# Patient Record
Sex: MALE | Race: WHITE | Employment: OTHER | URBAN - METROPOLITAN AREA
[De-identification: names, ages, dates, MRNs, and addresses within clinical notes are randomized per-mention and may not be internally consistent; named-entity substitution may affect disease eponyms.]

---

## 2021-10-20 ENCOUNTER — APPOINTMENT (OUTPATIENT)
Dept: CT IMAGING | Age: 71
End: 2021-10-20
Payer: MEDICARE

## 2021-10-20 ENCOUNTER — HOSPITAL ENCOUNTER (INPATIENT)
Age: 71
LOS: 6 days | Discharge: HOME | DRG: 166 | End: 2021-10-26
Attending: INTERNAL MEDICINE | Admitting: INTERNAL MEDICINE
Payer: MEDICARE

## 2021-10-20 ENCOUNTER — APPOINTMENT (OUTPATIENT)
Dept: MRI IMAGING | Age: 71
End: 2021-10-20
Payer: MEDICARE

## 2021-10-20 ENCOUNTER — HOSPITAL ENCOUNTER (EMERGENCY)
Age: 71
Discharge: ANOTHER HOSPITAL | End: 2021-10-20
Attending: EMERGENCY MEDICINE
Payer: MEDICARE

## 2021-10-20 VITALS
DIASTOLIC BLOOD PRESSURE: 96 MMHG | HEART RATE: 80 BPM | BODY MASS INDEX: 26.95 KG/M2 | WEIGHT: 210 LBS | TEMPERATURE: 97 F | OXYGEN SATURATION: 98 % | SYSTOLIC BLOOD PRESSURE: 131 MMHG | HEIGHT: 74 IN | RESPIRATION RATE: 27 BRPM

## 2021-10-20 DIAGNOSIS — R91.8 LUNG MASS: ICD-10-CM

## 2021-10-20 DIAGNOSIS — R41.0 DELIRIUM, ACUTE: Primary | ICD-10-CM

## 2021-10-20 DIAGNOSIS — D49.6 NEOPLASM OF BRAIN CAUSING MASS EFFECT ON ADJACENT STRUCTURES (HCC): ICD-10-CM

## 2021-10-20 DIAGNOSIS — G93.5 NEOPLASM OF BRAIN CAUSING MASS EFFECT ON ADJACENT STRUCTURES (HCC): ICD-10-CM

## 2021-10-20 DIAGNOSIS — G40.909 SEIZURE CEREBRAL (HCC): ICD-10-CM

## 2021-10-20 DIAGNOSIS — S22.43XA CLOSED FRACTURE OF MULTIPLE RIBS OF BOTH SIDES, INITIAL ENCOUNTER: ICD-10-CM

## 2021-10-20 PROBLEM — R90.0 INTRACRANIAL MASS: Status: ACTIVE | Noted: 2021-10-20

## 2021-10-20 LAB
A/G RATIO: 1.3 (ref 1.1–2.2)
ALBUMIN SERPL-MCNC: 3.9 G/DL (ref 3.4–5)
ALP BLD-CCNC: 86 U/L (ref 40–129)
ALT SERPL-CCNC: 20 U/L (ref 10–40)
AMMONIA: 36 UMOL/L (ref 16–60)
AMPHETAMINE SCREEN, URINE: ABNORMAL
ANION GAP SERPL CALCULATED.3IONS-SCNC: 16 MMOL/L (ref 3–16)
APPEARANCE CSF: CLEAR
APPEARANCE CSF: CLEAR
AST SERPL-CCNC: 17 U/L (ref 15–37)
BARBITURATE SCREEN URINE: ABNORMAL
BASE EXCESS ARTERIAL: -2.1 MMOL/L (ref -3–3)
BASE EXCESS VENOUS: -2.8 MMOL/L (ref -3–3)
BASOPHILS ABSOLUTE: 0.1 K/UL (ref 0–0.2)
BASOPHILS RELATIVE PERCENT: 0.8 %
BENZODIAZEPINE SCREEN, URINE: ABNORMAL
BILIRUB SERPL-MCNC: 0.3 MG/DL (ref 0–1)
BILIRUBIN DIRECT: <0.2 MG/DL (ref 0–0.3)
BILIRUBIN URINE: NEGATIVE
BILIRUBIN, INDIRECT: NORMAL MG/DL (ref 0–1)
BLOOD, URINE: NEGATIVE
BUN BLDV-MCNC: 24 MG/DL (ref 7–20)
CALCIUM SERPL-MCNC: 9.6 MG/DL (ref 8.3–10.6)
CANNABINOID SCREEN URINE: POSITIVE
CARBOXYHEMOGLOBIN ARTERIAL: 1.7 % (ref 0–1.5)
CARBOXYHEMOGLOBIN: 3.6 % (ref 0–1.5)
CHLORIDE BLD-SCNC: 103 MMOL/L (ref 99–110)
CLARITY: CLEAR
CLOT EVALUATION CSF: NORMAL
CLOT EVALUATION CSF: NORMAL
CO2: 19 MMOL/L (ref 21–32)
COCAINE METABOLITE SCREEN URINE: ABNORMAL
COLOR CSF: COLORLESS
COLOR CSF: COLORLESS
COLOR: YELLOW
CREAT SERPL-MCNC: 1 MG/DL (ref 0.8–1.3)
EKG ATRIAL RATE: 159 BPM
EKG ATRIAL RATE: 46 BPM
EKG DIAGNOSIS: NORMAL
EKG DIAGNOSIS: NORMAL
EKG P AXIS: 28 DEGREES
EKG P-R INTERVAL: 196 MS
EKG Q-T INTERVAL: 302 MS
EKG Q-T INTERVAL: 488 MS
EKG QRS DURATION: 122 MS
EKG QRS DURATION: 90 MS
EKG QTC CALCULATION (BAZETT): 427 MS
EKG QTC CALCULATION (BAZETT): 489 MS
EKG R AXIS: 25 DEGREES
EKG R AXIS: 57 DEGREES
EKG T AXIS: -1 DEGREES
EKG T AXIS: -67 DEGREES
EKG VENTRICULAR RATE: 158 BPM
EKG VENTRICULAR RATE: 46 BPM
EOSINOPHILS ABSOLUTE: 0.1 K/UL (ref 0–0.6)
EOSINOPHILS RELATIVE PERCENT: 1.1 %
EPITHELIAL CELLS, UA: 1 /HPF (ref 0–5)
GFR AFRICAN AMERICAN: >60
GFR NON-AFRICAN AMERICAN: >60
GLOBULIN: 3.1 G/DL
GLUCOSE BLD-MCNC: 185 MG/DL (ref 70–99)
GLUCOSE URINE: NEGATIVE MG/DL
GLUCOSE, CSF: 90 MG/DL (ref 40–80)
HCO3 ARTERIAL: 25.1 MMOL/L (ref 21–29)
HCO3 VENOUS: 21.8 MMOL/L (ref 23–29)
HCT VFR BLD CALC: 42.2 % (ref 40.5–52.5)
HEMOGLOBIN, ART, EXTENDED: 16 G/DL (ref 13.5–17.5)
HEMOGLOBIN: 13.9 G/DL (ref 13.5–17.5)
HYALINE CASTS: 0 /LPF (ref 0–8)
KETONES, URINE: NEGATIVE MG/DL
LACTIC ACID: 6.7 MMOL/L (ref 0.4–2)
LEUKOCYTE ESTERASE, URINE: NEGATIVE
LIPASE: 21 U/L (ref 13–60)
LYMPHOCYTES ABSOLUTE: 1.2 K/UL (ref 1–5.1)
LYMPHOCYTES RELATIVE PERCENT: 10.1 %
Lab: ABNORMAL
MCH RBC QN AUTO: 28.3 PG (ref 26–34)
MCHC RBC AUTO-ENTMCNC: 32.8 G/DL (ref 31–36)
MCV RBC AUTO: 86.2 FL (ref 80–100)
METHADONE SCREEN, URINE: ABNORMAL
METHEMOGLOBIN ARTERIAL: 0 %
METHEMOGLOBIN VENOUS: 0 %
MICROSCOPIC EXAMINATION: YES
MONOCYTES ABSOLUTE: 0.9 K/UL (ref 0–1.3)
MONOCYTES RELATIVE PERCENT: 7.1 %
NEUTROPHILS ABSOLUTE: 9.7 K/UL (ref 1.7–7.7)
NEUTROPHILS RELATIVE PERCENT: 80.9 %
NITRITE, URINE: NEGATIVE
NO DIFFERENTIAL CSF: NORMAL
NO DIFFERENTIAL CSF: NORMAL
O2 CONTENT, VEN: 19 VOL %
O2 SAT, ARTERIAL: >100 %
O2 SAT, VEN: 99 %
O2 THERAPY: ABNORMAL
O2 THERAPY: ABNORMAL
OPIATE SCREEN URINE: ABNORMAL
OXYCODONE URINE: ABNORMAL
PCO2 ARTERIAL: 51 MMHG (ref 35–45)
PCO2, VEN: 36.7 MMHG (ref 40–50)
PDW BLD-RTO: 16.4 % (ref 12.4–15.4)
PH ARTERIAL: 7.3 (ref 7.35–7.45)
PH UA: 6
PH UA: 6 (ref 5–8)
PH VENOUS: 7.38 (ref 7.35–7.45)
PHENCYCLIDINE SCREEN URINE: ABNORMAL
PLATELET # BLD: 283 K/UL (ref 135–450)
PMV BLD AUTO: 8.7 FL (ref 5–10.5)
PO2 ARTERIAL: 239 MMHG (ref 75–108)
PO2, VEN: 120 MMHG (ref 25–40)
POTASSIUM REFLEX MAGNESIUM: 4.5 MMOL/L (ref 3.5–5.1)
PROPOXYPHENE SCREEN: ABNORMAL
PROTEIN CSF: 50 MG/DL (ref 15–45)
PROTEIN UA: 100 MG/DL
RBC # BLD: 4.9 M/UL (ref 4.2–5.9)
RBC CSF: 0 /CUMM
RBC CSF: 0 /CUMM
RBC UA: 1 /HPF (ref 0–4)
SODIUM BLD-SCNC: 138 MMOL/L (ref 136–145)
SPECIFIC GRAVITY UA: 1.01 (ref 1–1.03)
TCO2 ARTERIAL: 59.8 MMOL/L
TCO2 CALC VENOUS: 51 MMOL/L
TOTAL CK: 106 U/L (ref 39–308)
TOTAL PROTEIN: 7 G/DL (ref 6.4–8.2)
TROPONIN: <0.01 NG/ML
TUBE NUMBER CSF: NORMAL
TUBE NUMBER CSF: NORMAL
URINE REFLEX TO CULTURE: ABNORMAL
URINE TYPE: ABNORMAL
UROBILINOGEN, URINE: 0.2 E.U./DL
WBC # BLD: 12 K/UL (ref 4–11)
WBC CSF: 0 /CUMM (ref 0–5)
WBC CSF: 0 /CUMM (ref 0–5)
WBC UA: 1 /HPF (ref 0–5)

## 2021-10-20 PROCEDURE — 6360000004 HC RX CONTRAST MEDICATION: Performed by: EMERGENCY MEDICINE

## 2021-10-20 PROCEDURE — 81001 URINALYSIS AUTO W/SCOPE: CPT

## 2021-10-20 PROCEDURE — 6360000002 HC RX W HCPCS

## 2021-10-20 PROCEDURE — 82550 ASSAY OF CK (CPK): CPT

## 2021-10-20 PROCEDURE — 80307 DRUG TEST PRSMV CHEM ANLYZR: CPT

## 2021-10-20 PROCEDURE — 83690 ASSAY OF LIPASE: CPT

## 2021-10-20 PROCEDURE — 99223 1ST HOSP IP/OBS HIGH 75: CPT | Performed by: INTERNAL MEDICINE

## 2021-10-20 PROCEDURE — 6360000002 HC RX W HCPCS: Performed by: NURSE PRACTITIONER

## 2021-10-20 PROCEDURE — 96375 TX/PRO/DX INJ NEW DRUG ADDON: CPT

## 2021-10-20 PROCEDURE — A9577 INJ MULTIHANCE: HCPCS | Performed by: EMERGENCY MEDICINE

## 2021-10-20 PROCEDURE — 96376 TX/PRO/DX INJ SAME DRUG ADON: CPT

## 2021-10-20 PROCEDURE — 96366 THER/PROPH/DIAG IV INF ADDON: CPT

## 2021-10-20 PROCEDURE — 89050 BODY FLUID CELL COUNT: CPT

## 2021-10-20 PROCEDURE — 71260 CT THORAX DX C+: CPT

## 2021-10-20 PROCEDURE — 2000000000 HC ICU R&B

## 2021-10-20 PROCEDURE — 2500000003 HC RX 250 WO HCPCS

## 2021-10-20 PROCEDURE — 84484 ASSAY OF TROPONIN QUANT: CPT

## 2021-10-20 PROCEDURE — 95819 EEG AWAKE AND ASLEEP: CPT

## 2021-10-20 PROCEDURE — 80053 COMPREHEN METABOLIC PANEL: CPT

## 2021-10-20 PROCEDURE — 87040 BLOOD CULTURE FOR BACTERIA: CPT

## 2021-10-20 PROCEDURE — 72125 CT NECK SPINE W/O DYE: CPT

## 2021-10-20 PROCEDURE — 93010 ELECTROCARDIOGRAM REPORT: CPT | Performed by: INTERNAL MEDICINE

## 2021-10-20 PROCEDURE — 87070 CULTURE OTHR SPECIMN AEROBIC: CPT

## 2021-10-20 PROCEDURE — 87205 SMEAR GRAM STAIN: CPT

## 2021-10-20 PROCEDURE — 82945 GLUCOSE OTHER FLUID: CPT

## 2021-10-20 PROCEDURE — 85025 COMPLETE CBC W/AUTO DIFF WBC: CPT

## 2021-10-20 PROCEDURE — 72128 CT CHEST SPINE W/O DYE: CPT

## 2021-10-20 PROCEDURE — 2580000003 HC RX 258: Performed by: NURSE PRACTITIONER

## 2021-10-20 PROCEDURE — 87529 HSV DNA AMP PROBE: CPT

## 2021-10-20 PROCEDURE — 70450 CT HEAD/BRAIN W/O DYE: CPT

## 2021-10-20 PROCEDURE — 3209999900 CT LUMBAR SPINE TRAUMA RECONSTRUCTION

## 2021-10-20 PROCEDURE — 83605 ASSAY OF LACTIC ACID: CPT

## 2021-10-20 PROCEDURE — 99284 EMERGENCY DEPT VISIT MOD MDM: CPT

## 2021-10-20 PROCEDURE — 2500000003 HC RX 250 WO HCPCS: Performed by: EMERGENCY MEDICINE

## 2021-10-20 PROCEDURE — 88112 CYTOPATH CELL ENHANCE TECH: CPT

## 2021-10-20 PROCEDURE — 2580000003 HC RX 258: Performed by: STUDENT IN AN ORGANIZED HEALTH CARE EDUCATION/TRAINING PROGRAM

## 2021-10-20 PROCEDURE — 6360000002 HC RX W HCPCS: Performed by: STUDENT IN AN ORGANIZED HEALTH CARE EDUCATION/TRAINING PROGRAM

## 2021-10-20 PROCEDURE — 2580000003 HC RX 258: Performed by: EMERGENCY MEDICINE

## 2021-10-20 PROCEDURE — 6360000002 HC RX W HCPCS: Performed by: EMERGENCY MEDICINE

## 2021-10-20 PROCEDURE — 84157 ASSAY OF PROTEIN OTHER: CPT

## 2021-10-20 PROCEDURE — 96365 THER/PROPH/DIAG IV INF INIT: CPT

## 2021-10-20 PROCEDURE — 82803 BLOOD GASES ANY COMBINATION: CPT

## 2021-10-20 PROCEDURE — 82140 ASSAY OF AMMONIA: CPT

## 2021-10-20 PROCEDURE — 93005 ELECTROCARDIOGRAM TRACING: CPT | Performed by: EMERGENCY MEDICINE

## 2021-10-20 PROCEDURE — 36415 COLL VENOUS BLD VENIPUNCTURE: CPT

## 2021-10-20 PROCEDURE — 70553 MRI BRAIN STEM W/O & W/DYE: CPT

## 2021-10-20 PROCEDURE — C9113 INJ PANTOPRAZOLE SODIUM, VIA: HCPCS | Performed by: NURSE PRACTITIONER

## 2021-10-20 RX ORDER — ONDANSETRON 2 MG/ML
4 INJECTION INTRAMUSCULAR; INTRAVENOUS EVERY 6 HOURS PRN
Status: DISCONTINUED | OUTPATIENT
Start: 2021-10-20 | End: 2021-10-22

## 2021-10-20 RX ORDER — ACETAMINOPHEN 325 MG/1
650 TABLET ORAL EVERY 6 HOURS PRN
Status: DISCONTINUED | OUTPATIENT
Start: 2021-10-20 | End: 2021-10-26 | Stop reason: HOSPADM

## 2021-10-20 RX ORDER — SODIUM CHLORIDE 0.9 % (FLUSH) 0.9 %
5-40 SYRINGE (ML) INJECTION PRN
Status: DISCONTINUED | OUTPATIENT
Start: 2021-10-20 | End: 2021-10-26 | Stop reason: HOSPADM

## 2021-10-20 RX ORDER — DEXMEDETOMIDINE HYDROCHLORIDE 4 UG/ML
.2-1.4 INJECTION, SOLUTION INTRAVENOUS CONTINUOUS
Status: DISCONTINUED | OUTPATIENT
Start: 2021-10-20 | End: 2021-10-20 | Stop reason: HOSPADM

## 2021-10-20 RX ORDER — ACETAMINOPHEN 650 MG/1
650 SUPPOSITORY RECTAL EVERY 6 HOURS PRN
Status: DISCONTINUED | OUTPATIENT
Start: 2021-10-20 | End: 2021-10-26 | Stop reason: HOSPADM

## 2021-10-20 RX ORDER — DEXMEDETOMIDINE HYDROCHLORIDE 4 UG/ML
.2-1.4 INJECTION INTRAVENOUS CONTINUOUS
Status: DISCONTINUED | OUTPATIENT
Start: 2021-10-20 | End: 2021-10-20

## 2021-10-20 RX ORDER — SODIUM CHLORIDE 0.9 % (FLUSH) 0.9 %
5-40 SYRINGE (ML) INJECTION PRN
Status: DISCONTINUED | OUTPATIENT
Start: 2021-10-20 | End: 2021-10-20 | Stop reason: HOSPADM

## 2021-10-20 RX ORDER — DEXAMETHASONE SODIUM PHOSPHATE 4 MG/ML
10 INJECTION, SOLUTION INTRA-ARTICULAR; INTRALESIONAL; INTRAMUSCULAR; INTRAVENOUS; SOFT TISSUE ONCE
Status: DISCONTINUED | OUTPATIENT
Start: 2021-10-20 | End: 2021-10-20 | Stop reason: SDUPTHER

## 2021-10-20 RX ORDER — HALOPERIDOL 5 MG/ML
INJECTION INTRAMUSCULAR
Status: COMPLETED
Start: 2021-10-20 | End: 2021-10-20

## 2021-10-20 RX ORDER — KETAMINE HYDROCHLORIDE 100 MG/ML
200 INJECTION, SOLUTION INTRAMUSCULAR; INTRAVENOUS ONCE
Status: COMPLETED | OUTPATIENT
Start: 2021-10-20 | End: 2021-10-20

## 2021-10-20 RX ORDER — ATROPINE SULFATE 0.1 MG/ML
INJECTION INTRAVENOUS
Status: COMPLETED
Start: 2021-10-20 | End: 2021-10-20

## 2021-10-20 RX ORDER — DEXAMETHASONE SODIUM PHOSPHATE 4 MG/ML
4 INJECTION, SOLUTION INTRA-ARTICULAR; INTRALESIONAL; INTRAMUSCULAR; INTRAVENOUS; SOFT TISSUE EVERY 6 HOURS
Status: DISCONTINUED | OUTPATIENT
Start: 2021-10-20 | End: 2021-10-22

## 2021-10-20 RX ORDER — KETAMINE HYDROCHLORIDE 100 MG/ML
INJECTION, SOLUTION INTRAMUSCULAR; INTRAVENOUS
Status: COMPLETED
Start: 2021-10-20 | End: 2021-10-20

## 2021-10-20 RX ORDER — LORAZEPAM 2 MG/ML
2 INJECTION INTRAMUSCULAR ONCE
Status: COMPLETED | OUTPATIENT
Start: 2021-10-20 | End: 2021-10-20

## 2021-10-20 RX ORDER — PANTOPRAZOLE SODIUM 40 MG/10ML
40 INJECTION, POWDER, LYOPHILIZED, FOR SOLUTION INTRAVENOUS DAILY
Status: DISCONTINUED | OUTPATIENT
Start: 2021-10-20 | End: 2021-10-22

## 2021-10-20 RX ORDER — SODIUM CHLORIDE 0.9 % (FLUSH) 0.9 %
5-40 SYRINGE (ML) INJECTION EVERY 12 HOURS SCHEDULED
Status: DISCONTINUED | OUTPATIENT
Start: 2021-10-20 | End: 2021-10-26 | Stop reason: HOSPADM

## 2021-10-20 RX ORDER — ONDANSETRON 4 MG/1
4 TABLET, ORALLY DISINTEGRATING ORAL EVERY 8 HOURS PRN
Status: DISCONTINUED | OUTPATIENT
Start: 2021-10-20 | End: 2021-10-22

## 2021-10-20 RX ORDER — ROCURONIUM BROMIDE 10 MG/ML
INJECTION, SOLUTION INTRAVENOUS
Status: DISCONTINUED
Start: 2021-10-20 | End: 2021-10-20 | Stop reason: WASHOUT

## 2021-10-20 RX ORDER — SODIUM CHLORIDE 0.9 % (FLUSH) 0.9 %
10 SYRINGE (ML) INJECTION ONCE
Status: DISCONTINUED | OUTPATIENT
Start: 2021-10-20 | End: 2021-10-20 | Stop reason: HOSPADM

## 2021-10-20 RX ORDER — ATROPINE SULFATE 0.1 MG/ML
1 INJECTION INTRAVENOUS ONCE
Status: COMPLETED | OUTPATIENT
Start: 2021-10-20 | End: 2021-10-20

## 2021-10-20 RX ORDER — DEXAMETHASONE SODIUM PHOSPHATE 10 MG/ML
10 INJECTION INTRAMUSCULAR; INTRAVENOUS ONCE
Status: COMPLETED | OUTPATIENT
Start: 2021-10-20 | End: 2021-10-20

## 2021-10-20 RX ORDER — KETAMINE HYDROCHLORIDE 100 MG/ML
INJECTION, SOLUTION INTRAMUSCULAR; INTRAVENOUS
Status: DISCONTINUED
Start: 2021-10-20 | End: 2021-10-20 | Stop reason: HOSPADM

## 2021-10-20 RX ORDER — MIDAZOLAM HYDROCHLORIDE 5 MG/ML
INJECTION INTRAMUSCULAR; INTRAVENOUS
Status: DISCONTINUED
Start: 2021-10-20 | End: 2021-10-20 | Stop reason: HOSPADM

## 2021-10-20 RX ORDER — SODIUM CHLORIDE 9 MG/ML
25 INJECTION, SOLUTION INTRAVENOUS PRN
Status: DISCONTINUED | OUTPATIENT
Start: 2021-10-20 | End: 2021-10-20 | Stop reason: HOSPADM

## 2021-10-20 RX ORDER — ETOMIDATE 2 MG/ML
INJECTION INTRAVENOUS
Status: DISCONTINUED
Start: 2021-10-20 | End: 2021-10-20 | Stop reason: HOSPADM

## 2021-10-20 RX ORDER — POLYETHYLENE GLYCOL 3350 17 G/17G
17 POWDER, FOR SOLUTION ORAL DAILY PRN
Status: DISCONTINUED | OUTPATIENT
Start: 2021-10-20 | End: 2021-10-26 | Stop reason: HOSPADM

## 2021-10-20 RX ORDER — LEVOTHYROXINE SODIUM 0.1 MG/1
100 TABLET ORAL DAILY
Status: DISCONTINUED | OUTPATIENT
Start: 2021-10-21 | End: 2021-10-26 | Stop reason: HOSPADM

## 2021-10-20 RX ORDER — SODIUM CHLORIDE 9 MG/ML
25 INJECTION, SOLUTION INTRAVENOUS PRN
Status: DISCONTINUED | OUTPATIENT
Start: 2021-10-20 | End: 2021-10-26 | Stop reason: HOSPADM

## 2021-10-20 RX ORDER — SODIUM CHLORIDE 0.9 % (FLUSH) 0.9 %
5-40 SYRINGE (ML) INJECTION EVERY 12 HOURS SCHEDULED
Status: DISCONTINUED | OUTPATIENT
Start: 2021-10-20 | End: 2021-10-20 | Stop reason: HOSPADM

## 2021-10-20 RX ORDER — KETAMINE HYDROCHLORIDE 100 MG/ML
INJECTION, SOLUTION INTRAMUSCULAR; INTRAVENOUS
Status: DISCONTINUED
Start: 2021-10-20 | End: 2021-10-20 | Stop reason: WASHOUT

## 2021-10-20 RX ADMIN — ATROPINE SULFATE 1 MG: 0.1 INJECTION INTRAVENOUS at 13:02

## 2021-10-20 RX ADMIN — IOPAMIDOL 75 ML: 755 INJECTION, SOLUTION INTRAVENOUS at 07:09

## 2021-10-20 RX ADMIN — HALOPERIDOL LACTATE: 5 INJECTION INTRAMUSCULAR at 06:38

## 2021-10-20 RX ADMIN — KETAMINE HYDROCHLORIDE 200 MG: 100 INJECTION INTRAMUSCULAR; INTRAVENOUS at 06:37

## 2021-10-20 RX ADMIN — KETAMINE HYDROCHLORIDE 200 MG: 100 INJECTION, SOLUTION INTRAMUSCULAR; INTRAVENOUS at 06:37

## 2021-10-20 RX ADMIN — LEVETIRACETAM 1000 MG: 100 INJECTION, SOLUTION INTRAVENOUS at 17:56

## 2021-10-20 RX ADMIN — DEXAMETHASONE SODIUM PHOSPHATE 4 MG: 4 INJECTION, SOLUTION INTRAMUSCULAR; INTRAVENOUS at 23:18

## 2021-10-20 RX ADMIN — KETAMINE HYDROCHLORIDE 200 MG: 100 INJECTION INTRAMUSCULAR; INTRAVENOUS at 06:57

## 2021-10-20 RX ADMIN — LEVETIRACETAM 2000 MG: 100 INJECTION, SOLUTION INTRAVENOUS at 09:15

## 2021-10-20 RX ADMIN — SODIUM CHLORIDE, PRESERVATIVE FREE 10 ML: 5 INJECTION INTRAVENOUS at 23:19

## 2021-10-20 RX ADMIN — LORAZEPAM 2 MG: 2 INJECTION, SOLUTION INTRAMUSCULAR; INTRAVENOUS at 06:38

## 2021-10-20 RX ADMIN — DEXMEDETOMIDINE HYDROCHLORIDE 0.5 MCG/KG/HR: 4 INJECTION INTRAVENOUS at 06:35

## 2021-10-20 RX ADMIN — PANTOPRAZOLE SODIUM 40 MG: 40 INJECTION, POWDER, FOR SOLUTION INTRAVENOUS at 17:55

## 2021-10-20 RX ADMIN — DEXAMETHASONE SODIUM PHOSPHATE 4 MG: 4 INJECTION, SOLUTION INTRAMUSCULAR; INTRAVENOUS at 17:55

## 2021-10-20 RX ADMIN — GADOBENATE DIMEGLUMINE 20 ML: 529 INJECTION, SOLUTION INTRAVENOUS at 12:15

## 2021-10-20 RX ADMIN — ENOXAPARIN SODIUM 40 MG: 100 INJECTION SUBCUTANEOUS at 17:55

## 2021-10-20 RX ADMIN — DEXAMETHASONE SODIUM PHOSPHATE 10 MG: 10 INJECTION INTRAMUSCULAR; INTRAVENOUS at 12:04

## 2021-10-20 ASSESSMENT — PAIN DESCRIPTION - LOCATION: LOCATION: OTHER (COMMENT)

## 2021-10-20 ASSESSMENT — PAIN SCALES - GENERAL
PAINLEVEL_OUTOF10: 6
PAINLEVEL_OUTOF10: 0
PAINLEVEL_OUTOF10: 0

## 2021-10-20 NOTE — CONSULTS
ICU Consult Note       Hospital Day: 1  ICU Day: 1                                                        Code:No Order  Admit Date: 10/20/2021  PCP: Suzanne Rose                                  CC: Intracranial mass    HISTORY OF PRESENT ILLNESS:   Mr. Vincenzo Slade is a 71M PMH HTN, hypothyroidism, chronic left pleural effusions who presented to the [facility] on 10/20. Patient had presented to [facility] ED earlier today following a suspected witness seizure. Per patient's wife at bedside patient was in bed at around 5am this morning when he started to flail/jerk his left arm. She says she tried to calm him down but he became increasingly agitated. At that point she called EMS. When EMS arrived patient was combative and required 10mg versed and restraints. Patient reportedly urinated on himself during this episode. Patient's wife states he has never had a seizure in the past. States he had been agitated for the past 1 month. States he has never had headaches and rarely complains. In the ED patient's vitals were notable for HR 37. /75, saturating well. CT scan of the head revealed unclear suspected frontal mass with potentially multiple other lesions. MRI brain scan revealed multiple supratentorial and infratentorial masses with mild midline shift. Decadron was administered. Antiepileptic therapy of Keppra was administered due to the patient's witnessed suspected seizure. Lumbar puncture was performed due to concern for infection or SAH- this was negative. CT scan showed a large left perihilar infrahilar mass. Initial biopsy and thoracentesis were negative for malignanc    Patient was started on precedex for delirium and agitation however patient had persistent bradycardia. He received atropine with HR in 60s.  \"Patient will require agitation control he did very well with ketamine for for agitation control and sedation Precedex should be withheld due to his significant intermittent bradycardia. \"      PAST HISTORY:   No past medical history on file. No past surgical history on file. SocialHistory:   The patient lives at home with wife    Alcohol:  Illicit drugs: no use  Tobacco:  Prior smoker 1 ppd x 30y    Family History:  No family history on file. MEDICATIONS:     No current facility-administered medications on file prior to encounter. No current outpatient medications on file prior to encounter. Scheduled Meds:   Continuous Infusions:  PRN Meds:    Allergies: Not on File    REVIEW OF SYSTEMS:       History obtained from chart review, patient's spouse, patient    Review of Systems as noted in HPI    PHYSICAL EXAM:       Vitals: /74   Pulse (!) 48   Temp 95.9 °F (35.5 °C) (Axillary)   Resp 11   Wt 210 lb 15.7 oz (95.7 kg)   SpO2 100%   BMI 27.09 kg/m²     I/O:  No intake or output data in the 24 hours ending 10/20/21 1609  No intake/output data recorded. No intake/output data recorded. Physical Exam  Vitals reviewed. HENT:      Head: Normocephalic. Right Ear: External ear normal.      Left Ear: External ear normal.      Nose: Nose normal.      Mouth/Throat:      Mouth: Mucous membranes are dry. Eyes:      Extraocular Movements: Extraocular movements intact. Cardiovascular:      Rate and Rhythm: Regular rhythm. Bradycardia present. Pulses: Normal pulses. Heart sounds: No murmur heard. Pulmonary:      Effort: No respiratory distress. Breath sounds: No stridor. Rhonchi present. Abdominal:      Tenderness: There is no abdominal tenderness. There is no guarding or rebound. Musculoskeletal:      Cervical back: Normal range of motion. Neurological:      Mental Status: He is oriented to person, place, and time. He is lethargic. Cranial Nerves: No cranial nerve deficit. Sensory: No sensory deficit. Motor: No weakness.            Recent Labs     10/20/21  0616   PHART 7.300*   [id number] 51.0*   PO2ART 239.0* DATA:       Labs:  CBC:   Recent Labs     10/20/21  0614   WBC 12.0*   HGB 13.9   HCT 42.2          BMP:   Recent Labs     10/20/21  0614      K 4.5      CO2 19*   BUN 24*   CREATININE 1.0   GLUCOSE 185*     LFT's:   Recent Labs     10/20/21  0614   AST 17   ALT 20   BILITOT 0.3   ALKPHOS 86     Troponin:   Recent Labs     10/20/21  0614   TROPONINI <0.01     BNP:No results for input(s): BNP in the last 72 hours. ABGs:   Recent Labs     10/20/21  0616   PHART 7.300*   TTB3DVD 51.0*   PO2ART 239.0*     INR: No results for input(s): INR in the last 72 hours. U/A:  Recent Labs     10/20/21  0755   COLORU YELLOW   PHUR 6.0  6.0   WBCUA 1   RBCUA 1   CLARITYU Clear   SPECGRAV 1.014   LEUKOCYTESUR Negative   UROBILINOGEN 0.2   BILIRUBINUR Negative   BLOODU Negative   GLUCOSEU Negative       No orders to display       EKG:   Echo:  Micro:     ASSESSMENT AND PLAN:   Clarita Abreu is a 73 y.o. male, who was presents with newonset seizure like activity. 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 There are approximately 18 metastatic lesions scattered throughout the brain.  The largest lesion is in the right temporal lobe and measures 1.4 cm.  The 2nd largest lesion is a right frontal parietal lesion measuring 1.2 cm near the midline.  There is mild edema associated with many of the lesions without midline shift or significant mass effect.  - LP (10/20) negative, CSF studies WNL.  CSF cytology pending  - neurosurgery following  - continue keppra, decadron  - seizure precautions    Acute delirium vs. Postictal state  - sp ketamine, haldol, versed in ED  - started on precedex gtt but HR did not tolerate    Lung mass, large left infrahilar mass with pleural effusion. Possible primary carcinoma  - CT CAP (10/20) Large left infrahilar mass highly suspicious for primary lung carcinoma with metastatic disease suspected to the left adrenal gland and possible malignant pleural effusion.  Mass versus mucoid material is seen in the left lower bronchus focally.  Postobstructive airspace disease could represent pneumonia or atelectasis. - Oncology, radiation oncology consulted  - plan for bronch tomorrow (10/21)    Leukocytosis 2/2 seizure vs. Infectious etiology  - WBC on admission 12    Empyema of left pleural space sp L open decortication August 2021    Afib RVR, now sinus bradycardia    Bilateral anterolateral rib fractures  - patient's wife states he fell off of the bed while agitated during seizure episode  - pain control    Lactic acidosis 2/2 seizure vs. infection  - trend lactic acid q6h    This patient has been staffed and discussed with Mavis Cruz MD.   -----------------------------  Nguyen Perez DO, PGY-2  10/20/2021  4:09 PM    Pulm/CC    Patient seen and examined. I agree with Dr. Pato Ordonez history, physical, lab findings, assessment and plan. Patient transferred to [facility] from [facility] for further evaluation of multiple brain masses. EMS was called by wife due to concerns of a possible seizure. Kerri Stack was given in ED due to suspected seizure. After CT scan revealed multiple brain masses Decadron was administered. CT chest abdomen and pelvis was obtained that showed a left lower lobe lung mass with mediastinal invasion. Evidently in July and August patient had a evaluation for pneumonia with pleural effusion at [facility]. He had thoracentesis x2. The only was able to review pleural fluid analysis from his first thoracentesis. Cytology did not show any malignancy and LDH was 350 with glucose of 114.   Patient received antibiotics and ultimately had what sounds like a decortication from thoracic surgery    Most recently patient has had a continued cough with chest pain and dyspnea but no hemoptysis. In retrospect it is likely that Michael Reilly had a postobstructive pneumonia. I reviewed his CT images from this admission and have asked for CT [facility] from [facility]. I have reached out to endoscopy and they will work on getting him scheduled for a bronchoscopy with endobronchial ultrasound and biopsy with anesthesia tomorrow. Patient and wife are aware. He will be n.p.o. after midnight. I will check an INR. Ager and its benefits and risks have been discussed with patient and wife and they are agreeable to proceed    Continue Decadron and Keppra.     Surgery following        Elie Meraz MD

## 2021-10-20 NOTE — ED NOTES
Pt EKG changed observed, appearing to change from A-fib to sinus leena. Repeat EKG done. Dr. Belem Green aware.       Linda Howard RN  10/20/21 0296

## 2021-10-20 NOTE — ED NOTES
Report called to [facility] neuro ICU RN @[phone], no further questions at this time.       Sabrina Velasquez RN  10/20/21 3651

## 2021-10-20 NOTE — ED NOTES
Spoke to nursing supervisor Janet Chauhan at [facility], no Neuro ICU beds available, she suggested an ED to ED transfer, Dr. Bradley Rios, reached out to transfer center to arrange.       Gracie Cannon RN  10/20/21 2264

## 2021-10-20 NOTE — ED NOTES
Pt family in [address] [place] [address], Dr. Abdalla Diss updated family.      Renee Calvert RN  10/20/21 9825

## 2021-10-20 NOTE — ED NOTES
Lumbar Puncture completed by Dr. Miriam Bruno, no apparent bleeding at puncture site. Puncture site dressed, pt placed in supine position, CSF specimens labeled and walked over to lab. Will continue to monitor patient.      Benjamin Florez RN  10/20/21 1127

## 2021-10-20 NOTE — ED NOTES
Pt switched to Venturi Mask with fiO2 at 40% at 6L. Pt SpO2 97%. Removed 4pt hard straints from patient. Patient remains sedated.  Pt transported to [address] [place], [place] [address]TALI  10/20/21 1100

## 2021-10-20 NOTE — ED NOTES
Consent for lumbar puncture signed by pt's wife and placed on patient chart     Valentín Prince RN  10/20/21 9302

## 2021-10-20 NOTE — ED PROVIDER NOTES
eMERGENCY dEPARTMENT eNCOUnter      Cr Petersen    Chief Complaint   Patient presents with    Altered Mental Status     Pt. arrived via EMS. EMS reports wife called D/t seizure pt. became combative with EMS and arrived in restraints. Pt. was given 10mg versed from EMS. ROBERT Cash is a 73 y.o. male who presents ER for evaluation of suspected witnessed seizure with left arm posturing and global tonic-clonic movements, possible loss of of bowel and bladder function at the time of the event severely combative on arrival, requiring multiple members to restrain him. History obtained from chart review reveals questionable pneumonia and lung mass with pleural effusion, biopsy and cytology negative for malignancy. Collateral history is obtained from family members. Severe distress on arrival.  Positive suspected seizure, recent biopsy and questionable mechanical injury and trauma. PAST MEDICAL HISTORY    No past medical history on file. SURGICAL HISTORY    No past surgical history on file. CURRENT MEDICATIONS        ALLERGIES    Not on File    FAMILY HISTORY    No family history on file.     SOCIAL HISTORY    Social History     Socioeconomic History    Marital status:      Spouse name: Not on file    Number of children: Not on file    Years of education: Not on file    Highest education level: Not on file   Occupational History    Not on file   Tobacco Use    Smoking status: Not on file   Substance and Sexual Activity    Alcohol use: Not on file    Drug use: Not on file    Sexual activity: Not on file   Other Topics Concern    Not on file   Social History Narrative    Not on file     Social Determinants of Health     Financial Resource Strain:     Difficulty of Paying Living Expenses:    Food Insecurity:     Worried About Running Out of Food in the Last Year:     [address] in the Last Year:    Transportation Needs:     Lack of Transportation (Medical):    Bijan Nance Lack of Transportation (Non-Medical):    Physical Activity:     Days of Exercise per Week:     Minutes of Exercise per Session:    Stress:     Feeling of Stress :    Social Connections:     Frequency of Communication with Friends and Family:     Frequency of Social Gatherings with Friends and Family:     Attends [employer] Services:     Active Member of Clubs or Organizations:     Attends Club or Organization Meetings:     Marital Status:    Intimate Partner Violence:     Fear of Current or Ex-Partner:     Emotionally Abused:     Physically Abused:     Sexually Abused:        REVIEW OF SYSTEMS    Unable to be obtained due to critical illness  All systems negative except as marked. PHYSICAL EXAM    VITAL SIGNS: /75   Pulse (!) 37   Temp 97 °F (36.1 °C) (Rectal)   Resp 11   Ht 6' 2\" (1.88 m)   Wt 210 lb (95.3 kg)   SpO2 100%   BMI 26.96 kg/m²    Constitutional: Combative and agitated on arrival   HENT:  Normocephalic, Atraumatic, Bilateral external ears normal, Oropharynx moist, No oral exudates, Nose normal. Neck- Normal range of motion, No tenderness, Supple, No stridor. Eyes:  PERRL, EOMI, Conjunctiva normal, No discharge. Respiratory: Tachypnea  Cardiovascular: Tachycardia, irregular  GI:  Bowel sounds normal, Soft, No tenderness, No masses, No pulsatile masses. : External genitalia appear normal, No masses or lesions. No discharge. No CVA tenderness. Musculoskeletal:  Intact distal pulses, No edema, No tenderness, No cyanosis, No clubbing. Good range of motion in all major joints. No tenderness to palpation or major deformities noted. Back- No tenderness. Integument: Positive for diaphoresis  Lymphatic:  No lymphadenopathy noted. Neurologic: Repetitive nonsensical statements, moving all 4 extremities spontaneously combative and agitated.    Psychiatric: Combative agitated      Labs: BH 7.3 PCO2 51 tox positive for cannabinoids, urine no infection , CSF: No whites no reds no organisms normal protein and glucose CSF cytology sent  White blood cell count 12,000 hemoglobin normal platelets normal, glucose 185 creatinine normal.  Sodium normal.  EKG    Atrial fibrillation with right bundle branch block, nonspecific ST segment changes    RADIOLOGY    MRI brain:Multiple brain parenchymal metastases in the supratentorial and  infratentorial brain as discussed above. There are approximately 18  metastatic lesions scattered throughout the brain. The largest lesion is in  the right temporal lobe and measures 1.4 cm. The 2nd largest lesion is a  right frontal parietal lesion measuring 1.2 cm near the midline. There is  mild edema associated with many of the lesions without midline shift or  significant mass effect. CT chest abdomen pelvis:large mass is seen surrounding and extending inferior to the left hilum. This measures 6.4 x 4.4 cm. Subtle mass protrudes into the left lower lobe  bronchus, sample series 4, image 63. This is better seen on coronal image  82. This could potentially represent mucoid material.  In addition there is  airspace disease peripherally into the lower lobe likely some degree of  postobstructive volume loss or pneumonia. Pleural effusion is also present  possibly malignant. 2. There is patchy bilateral airspace disease elsewhere greatest involving  the right lower lobe posteriorly. This could be atelectatic disease however  pneumonia or aspiration is also considered. 3. No pneumothorax. This is in spite of rib fractures as described below. Soft Tissues/Bones: Bilateral rib fractures are present. Some of these are  better seen on sagittal projection due to the fracture being within the axial  plane. This involves the anterolateral right 3rd and 4th ribs as well as the  left lateral 7th rib.   CT lumbar spine: No fracture  PROCEDURES    Lumbar Puncture Procedure Note    Indication: Suspected meningitis and to rule out subarachnoid hemorrhage    Consent: The patient was counseled regarding the procedure, it's indications, risks, potential complications and alternatives and any questions were answered. Consent was obtained. Procedure: The patient was placed in the left lateral decubitus position and the appropriate landmarks were identified. The area was prepped and draped in the usual sterile fashion. Anesthesia was obtained using 2 cc of 1% Lidocaine without epinephrine. A spinal needle was inserted at the L4- L5 level with the stylet in place until spinal fluid was returned. Opening pressure was not measured. At this point 4.0 cc of clear cerebral spinal fluid was obtained and sent for appropriate testing. The stylet was then replaced and the needle was withdrawn. A sterile dressing was placed over the site and the patient was placed in the supine position. The patient tolerated the procedure well. Complications: None        ED COURSE & MEDICAL DECISION MAKING    Pertinent Labs & Imaging studies reviewed. (See chart for details)    Presented to the ER for evaluation of acute delirium, with severe combativeness requiring multiple doses of ketamine, and Precedex and Precedex infusion with several repeat dosing of ketamine. #1: Acute delirium. Brain pain mass multiple suspected mets. Initial CT scan revealed unclear suspected frontal mass with potentially multiple other lesions MRI brain scan BrainLab protocol as per neurosurgery reveals multiple supratentorial and infratentorial masses with mild midline shift. Decadron was administered. Antiepileptic therapy of Keppra was administered due to the patient's witnessed suspected seizure. He is never had initial regain of cognition and had severe combativeness for well over 1 hour. He was not-I need treatment he was not acidemic he had no meningoencephalitis or hemorrhage noted.   Bar puncture was initially performed due to concern for potential infection or subarachnoid hemorrhage all of which were negative. He had 0 whites 0 reds. Consultation with neurosurgery was performed, oncology will be consulted. He will require neuro intensive care unit monitoring for possible nonconvulsive status repeat seizure in persistent prolonged delirium which may be reflective from initial sedation versus underlying CNS disorder as described    #2: Bradycardia, atrial fibrillation transient. Initially presented during his severe delirium and atrial fibrillation with rapid ventricular rate, this did spontaneously convert after sedation he did have persistent bradycardia at times with heart rates in the 30s his Precedex was discontinued I did receive a dose of atropine and he is currently with a heart rate of 60 in sinus rhythm. #3: Lung mass, brain metastases. Patient's CT scan demonstrates a large left perihilar infrahilar mass. His initial biopsy biopsy and thoracentesis were negative for malignancy. However given the patient's CNS metastases this appears to be a potential primary malignancy he does have a history of prostate CA which is unlikely the source but within his differential diagnosis. #4: Bilateral rib fractures. Unclear if traumatic from seizure and fall, versus possible metastatic lesions, no pneumothorax noted on CT scan. #5: Combativeness delirium agitation. Again unclear if related to CNS mass and or post seizure. Sodium is normal.  He is afebrile. Patient will require agitation control he did very well with ketamine for for agitation control and sedation Precedex should be withheld due to his significant intermittent bradycardia. His end-tidal CO2 remained below 30, continue end-tidal CO2 monitoring and airway monitoring. May require endotracheal intubation full code family verbalized understanding of disposition and management of this. Family does understand the severity of the patient's underlying condition    Total critical care time provided today was 280 minutes.  This excludes seperately billable procedures and family discussion time. Critical care time provided for obtaining history, conducting a physical exam, performing and monitoring interventions, ordering, collecting and interpreting tests, and establishing medical decision-making. There was a potential for life/limb threatening pathology requiring close evaluation and intervention with concern for patient decompensation. FINAL IMPRESSION    1. Delirium, acute    2. Seizure cerebral ([facility] [address]    3. Neoplasm of brain causing mass effect on adjacent structures (HCC)    4. Closed fracture of multiple ribs of both sides, initial encounter    5.  Lung mass             Gabbi Kunz MD  74/47/66 [id number]

## 2021-10-20 NOTE — ED NOTES
Report given to [facility] MICU transport team, no further questions at this time. Pt exhibited no sign of distress with spontaneous eye opening at time of transport.       Harvey Buenrostro, TALI  10/20/21 2222

## 2021-10-20 NOTE — PROGRESS NOTES
General Internal Medicine Attending    Chart and data reviewed. Patient seen and examined, case discussed with medical resident. Physical exam repeated. Labs and imaging studies reviewed. Agree with documentation, assessment and plan as outlined above. 1. Intracranial mass,  Non traumatic with vasogenic edema  2. Acute metabolic encephalopathy  3. Possible Seizures, new onset  4. Lung mass, large left infrahilar mass with pleural effusion, possibly malignant effusion  5. Right LL airspace disease  6. Lactic acidosis  7. Afib RVR,  paroxysmal afib with subsequent Sinus Bradycardia  8. Bilateral anterolateral rib fractures, of undetermined etiology  9. Prostate cancer hx          Chart review: Recently felt to have Pneumonia and  Empyema of left pleural space (s/p decortication at [facility] in 8/2021    Presented to the ED at [place] with mental status changes. Noted with encephalopathy and probable seizures from brain mass with vasogenic edema    Imaging: Possible Primary lung cancer with brain and intra-abdominal mets (left adrenal)    Mild leucocytosis with mild left shift, with tachycardia, and airspace disease, possible post-obstructive pneumonia    He appears to h/e have had recent Empyema of left pleural space (s/p decortication at [facility]    His lactic acidosis may be sec to seizure or infection. His Afib is likely sec to intrathoracic mass, as probably his RBBB    CT C/A/P: Large left infrahilar mass highly suspicious for primary lung carcinoma with metastatic disease suspected to the left adrenal gland and possible malignant pleural effusion.  Mass versus mucoid material is seen in the left lower bronchus focally. Rib fractures, unable to determine at this time if sec to fall, or pathologic fractures    Required ketamine, haldol and Versed due to agitation noted in the ED.  Started on Precedex gtt    S/p Decadron and Keppra        Admit to ICU  Obtain Brain MRI  May need cVEEG    Monitor vitals, labs including lactate  Check blood cultures  Check PCT      Keep on telemetry  Trend troponins  Check TSH, Mg  Obtain echocardiogram      Continue Precedex  Continue Keppra  Continue Decadron    Low threshold to consider empiric antibx, for possible post obstructive pneumonia, pending further labs/cx    Neurosurgery consult  Neurology consult  Will need path: Defer site to Oncology. Oncology consult      Pxt is very ill with high risk for deterioration  Palliative care consult with his new diagnosis of metastatic cancer      Rest as per resident's note.       Shimon Kolb MD

## 2021-10-20 NOTE — ED NOTES
Pt. Arrived combative in leather restraints with EMS. Pt. Was flailing arms and legs. Pt. Was placed in restraints per order. Pt. Received 10mg versed from EMS which was ineffective. Pt. Unable to follow verbal commands and is just screaming and incoherent. Pt.  Was placed on monitor     Sandie Garcia RN  10/20/21 3904

## 2021-10-20 NOTE — H&P
ICU HISTORY AND PHYSICAL       Hospital Day: 1  ICU Day: 1                                                         Code:DNR-CCA  Admit Date: 10/20/2021  PCP: Diamond Leon                                  CC: Seizure    HISTORY OF PRESENT ILLNESS:   Lou Maciel is a 72-year-old male with past medical history of hypertension, hypothyroidism, chronic peripheral effusions, left rotator cuff tendinitis who presented with supposedly seizure this morning at 5 AM.    Patient had a uneventful night and went to sleep with his wife, and she woke up to him having a suspected seizure following his left arm and drinking from cytocide. Patient states she and her son attempted to calm him down and bring him out of it but he became increasingly agitated. They then called 911, and EMS arrived patient was combative and required 10 mg of Versed and restraints. Patient reportedly urinated on self and patient was taken to [facility] ED. Patient's wife states he has never had seizure in the past, or any neurological illnesses. She states that had not been complaining of anything recently or had any recent illnesses. She denies him reporting headaches, chest pain, nausea/vomiting, diarrhea/constipation, weakness, neurological symptoms/deficits. Of note, patient had a decortication of his lungs in the past few days for \"pneumonia\" where they removed 20% of his lung. His wife also states that his lung problems began in May of this year a week after he had his second Covid shot. In the ED, patient was found to be bradycardic with transient new onset atrial fibrillation with rapid ventricular rate. Due to his persistent bradycardia Precedex was discontinued and patient was given a dose of atropine. With a blood pressure 104/75 saturating well. CT head reported unclear suspected frontal mass with potentially other lesions. MRI brain was done that revealed multiple supratentorial and infratentorial masses with mild midline shift.  Patient was started on Decadron, loaded with Keppra due to suspected seizure. Lumbar puncture was done and concern for infection or subarachnoid hemorrhage which yielded no positive results. CT chest reported a large left perihilar infrahilar mass. Initial biopsy and thoracentesis was negative for malignancy. Patient was also found to have bilateral rib fractures unclear if from seizure or fall versus possible metastatic. Patient's delirium and agitation unclear if related to CNS mass and/or postictal state. Patient had undergone agitation control with ketamine and tolerated it well in the ED. Upon evaluation in the ICU, patient is to be admitted for further evaluation of his acute delirium, new onset seizure, brain metastasis. Upon speaking to his wife patient has a previous DNR, however she is unsure to how he feels now is that was done in 2011. She states that they will keep him DNR-CCA for now until he is of sound mind to make any CODE STATUS changes. PAST HISTORY:   No past medical history on file. No past surgical history on file. SocialHistory:   The patient lives at home with his family    Alcohol: Patient used to drink but quit  Illicit drugs: Marijuana  Tobacco: Patient smoked for 25 years from age [age] likely 1 pack to 1.5 packs a day    Family History:  No family history on file. MEDICATIONS:     No current facility-administered medications on file prior to encounter. No current outpatient medications on file prior to encounter.          Scheduled Meds:   dexamethasone  4 mg IntraVENous Q6H    pantoprazole  40 mg IntraVENous Daily    levetiracetam  1,000 mg IntraVENous Q12H    sodium chloride flush  5-40 mL IntraVENous 2 times per day    enoxaparin  40 mg SubCUTAneous Daily    [START ON 10/21/2021] levothyroxine  100 mcg Oral Daily      Continuous Infusions:   sodium chloride       PRN Meds:    Allergies: Not on File    REVIEW OF SYSTEMS:       History obtained from chart review, unobtainable from patient due to mental status and wife    Review of Systems   Unable to perform ROS: Mental status change (patient lethargic and unable to answer many questions)       PHYSICAL EXAM:       Vitals: /81   Pulse (!) 47   Temp 95.9 °F (35.5 °C) (Axillary)   Resp 11   Wt 210 lb 15.7 oz (95.7 kg)   SpO2 97%   BMI 27.09 kg/m²     I/O:  No intake or output data in the 24 hours ending 10/20/21 1722  No intake/output data recorded. No intake/output data recorded. Physical Examination:     Physical Exam  Vitals reviewed. Constitutional:       General: He is not in acute distress. Appearance: Normal appearance. He is normal weight. He is not ill-appearing or toxic-appearing. Comments: lethargic   HENT:      Head: Normocephalic and atraumatic. Nose: Nose normal.      Mouth/Throat:      Mouth: Mucous membranes are dry. Eyes:      General:         Right eye: No discharge. Left eye: No discharge. Extraocular Movements: Extraocular movements intact. Pupils: Pupils are equal, round, and reactive to light. Cardiovascular:      Rate and Rhythm: Normal rate and regular rhythm. Heart sounds: Normal heart sounds. No murmur heard. No friction rub. No gallop. Pulmonary:      Effort: Pulmonary effort is normal. No respiratory distress. Breath sounds: Normal breath sounds. No stridor. No wheezing, rhonchi or rales. Abdominal:      General: Bowel sounds are normal. There is no distension. Palpations: Abdomen is soft. There is no mass. Tenderness: There is no abdominal tenderness. There is no guarding or rebound. Hernia: No hernia is present. Musculoskeletal:         General: No swelling. Cervical back: Normal range of motion. Right lower leg: No edema. Left lower leg: No edema. Skin:     General: Skin is dry. Coloration: Skin is not jaundiced. Findings: Bruising (over OXANA hands, UE) present.    Neurological: Mental Status: He is disoriented. Cranial Nerves: No cranial nerve deficit. Sensory: No sensory deficit. Motor: No weakness. Comments: Oriented x2 to person and place, not to time or situation. Access:   -Central Access Day #:                                   -Peripheral Access Day#:1  -Arterial line Day#:                                  Cano Day#:  NGT Day#:                                             ETT Day#:  Vent Settings:    / / /     Recent Labs     10/20/21  0616   PHART 7.300*   NBY6NYD 51.0*   PO2ART 239.0*           DATA:       Labs:  CBC:   Recent Labs     10/20/21  0614   WBC 12.0*   HGB 13.9   HCT 42.2          BMP:   Recent Labs     10/20/21  0614      K 4.5      CO2 19*   BUN 24*   CREATININE 1.0   GLUCOSE 185*     LFT's:   Recent Labs     10/20/21  0614   AST 17   ALT 20   BILITOT 0.3   ALKPHOS 86     Troponin:   Recent Labs     10/20/21  0614   TROPONINI <0.01     BNP:No results for input(s): BNP in the last 72 hours. ABGs:   Recent Labs     10/20/21  0616   PHART 7.300*   EHP9UQH 51.0*   PO2ART 239.0*     INR: No results for input(s): INR in the last 72 hours.     U/A:  Recent Labs     10/20/21  0755   COLORU YELLOW   PHUR 6.0  6.0   WBCUA 1   RBCUA 1   CLARITYU Clear   SPECGRAV 1.014   LEUKOCYTESUR Negative   UROBILINOGEN 0.2   BILIRUBINUR Negative   BLOODU Negative   GLUCOSEU Negative       No orders to display       EKG:   Echo:  Micro:     ASSESSMENT AND PLAN:   Amara Barone is a 73 y.o. male, who presents to the ICU with new onset seizure, acute delirium, brain lesion metastasis  New onset seizures 2/2 intracranial mass  - CTH (10/20) Low attenuating areas within the right frontal and right temporoparietal lobes likely due to vasogenic edema from malignancy rather than age-indeterminate infarcts  - MRI brain (10/20) Multiple brain parenchymal metastases in the supratentorial and  infratentorial brain. There are approximately 18 metastatic lesions scattered throughout the brain.  The largest lesion is in the right temporal lobe and measures 1.4 cm.  The 2nd largest lesion is a right frontal parietal lesion measuring 1.2 cm near the midline.  There is mild edema associated with many of the lesions without midline shift or significant mass effect.  - LP (10/20) negative, CSF studies WNL. CSF cytology pending  - continue keppra, decadron  - EEG ordered, consider continuous EEG if seizures persist  -Neurology consulted, appreciate recs  - Neurosurgery following, agree with neurochecks, Decadron, Keppra. No dextrose ivf, keep sodium within normal limits, okay for DVT prophylaxis.  -Consulted oncology, appreciate recs  -Consulted radiation oncology  - seizure precautions     Acute delirium vs. Postictal state  - sp ketamine, haldol, versed in ED  - started on precedex gtt but withheld as heart rate did not tolerate     Lung mass, large left infrahilar mass with pleural effusion. Possible primary carcinoma  - CT CAP (10/20) Large left infrahilar mass highly suspicious for primary lung carcinoma with metastatic disease suspected to the left adrenal gland and possible malignant pleural effusion.  Mass versus mucoid material is seen in the left lower bronchus focally.  Postobstructive airspace disease could represent pneumonia or atelectasis.   - plan for bronch tomorrow (10/21)/biopsy of lung mass for pathology     Leukocytosis 2/2 seizure vs. Infectious etiology  - WBC on admission 12     Empyema of left pleural space sp L open decortication August 2021   - BCx2    Afib RVR, now sinus bradycardia  - given atropine in ED  - avoid precedex    Bilateral anterolateral rib fractures - trauma vs seizure  - patient's wife states he fell off of the bed while agitated during seizure episode  - pain control     Lactic acidosis 2/2 seizure vs. infection  - trend lactic acid q6h    Hypothyroidism  -Continue home levothyroxine 100 mcg daily    Code Status:DNR-CCA  FEN: Regular diet, NPO at midnight for bronch tomorrow  PPX: Lovenox  DISPO: ICU    This patient has been staffed and discussed with Edy Christensen MD.   -----------------------------  Nilam Piña DO, PGY-1  10/20/2021  5:22 PM

## 2021-10-21 ENCOUNTER — ANESTHESIA EVENT (OUTPATIENT)
Dept: ENDOSCOPY | Age: 71
DRG: 166 | End: 2021-10-21
Payer: MEDICARE

## 2021-10-21 ENCOUNTER — ANESTHESIA (OUTPATIENT)
Dept: ENDOSCOPY | Age: 71
DRG: 166 | End: 2021-10-21
Payer: MEDICARE

## 2021-10-21 VITALS — DIASTOLIC BLOOD PRESSURE: 64 MMHG | OXYGEN SATURATION: 81 % | SYSTOLIC BLOOD PRESSURE: 105 MMHG

## 2021-10-21 PROBLEM — R91.1 LESION OF LUNG: Status: ACTIVE | Noted: 2021-10-21

## 2021-10-21 PROBLEM — R56.9 FIRST TIME SEIZURE (HCC): Status: ACTIVE | Noted: 2021-10-21

## 2021-10-21 LAB
ANION GAP SERPL CALCULATED.3IONS-SCNC: 12 MMOL/L (ref 3–16)
BUN BLDV-MCNC: 24 MG/DL (ref 7–20)
CALCIUM SERPL-MCNC: 9.2 MG/DL (ref 8.3–10.6)
CHLORIDE BLD-SCNC: 104 MMOL/L (ref 99–110)
CO2: 24 MMOL/L (ref 21–32)
CREAT SERPL-MCNC: 0.7 MG/DL (ref 0.8–1.3)
GFR AFRICAN AMERICAN: >60
GFR NON-AFRICAN AMERICAN: >60
GLUCOSE BLD-MCNC: 131 MG/DL (ref 70–99)
HCT VFR BLD CALC: 40.5 % (ref 40.5–52.5)
HEMOGLOBIN: 13.3 G/DL (ref 13.5–17.5)
INR BLD: 0.96 (ref 0.88–1.12)
LACTIC ACID: 0.7 MMOL/L (ref 0.4–2)
LACTIC ACID: 1.1 MMOL/L (ref 0.4–2)
LACTIC ACID: 2.8 MMOL/L (ref 0.4–2)
MCH RBC QN AUTO: 28.4 PG (ref 26–34)
MCHC RBC AUTO-ENTMCNC: 32.9 G/DL (ref 31–36)
MCV RBC AUTO: 86.4 FL (ref 80–100)
PDW BLD-RTO: 16.4 % (ref 12.4–15.4)
PLATELET # BLD: 209 K/UL (ref 135–450)
PMV BLD AUTO: 9.2 FL (ref 5–10.5)
POTASSIUM REFLEX MAGNESIUM: 4.1 MMOL/L (ref 3.5–5.1)
PROTHROMBIN TIME: 10.8 SEC (ref 9.9–12.7)
RBC # BLD: 4.68 M/UL (ref 4.2–5.9)
SODIUM BLD-SCNC: 140 MMOL/L (ref 136–145)
WBC # BLD: 9.9 K/UL (ref 4–11)

## 2021-10-21 PROCEDURE — 0BBJ8ZX EXCISION OF LEFT LOWER LUNG LOBE, VIA NATURAL OR ARTIFICIAL OPENING ENDOSCOPIC, DIAGNOSTIC: ICD-10-PCS | Performed by: INTERNAL MEDICINE

## 2021-10-21 PROCEDURE — 2580000003 HC RX 258: Performed by: STUDENT IN AN ORGANIZED HEALTH CARE EDUCATION/TRAINING PROGRAM

## 2021-10-21 PROCEDURE — 2060000000 HC ICU INTERMEDIATE R&B

## 2021-10-21 PROCEDURE — 88305 TISSUE EXAM BY PATHOLOGIST: CPT

## 2021-10-21 PROCEDURE — 3700000001 HC ADD 15 MINUTES (ANESTHESIA): Performed by: INTERNAL MEDICINE

## 2021-10-21 PROCEDURE — 99223 1ST HOSP IP/OBS HIGH 75: CPT | Performed by: PSYCHIATRY & NEUROLOGY

## 2021-10-21 PROCEDURE — 3609011300 HC BRONCHOSCOPY BRONCHIAL/ENDOBRNCL BX 1+ SITES: Performed by: INTERNAL MEDICINE

## 2021-10-21 PROCEDURE — 2709999900 HC NON-CHARGEABLE SUPPLY: Performed by: INTERNAL MEDICINE

## 2021-10-21 PROCEDURE — 6370000000 HC RX 637 (ALT 250 FOR IP): Performed by: STUDENT IN AN ORGANIZED HEALTH CARE EDUCATION/TRAINING PROGRAM

## 2021-10-21 PROCEDURE — 92610 EVALUATE SWALLOWING FUNCTION: CPT

## 2021-10-21 PROCEDURE — 3609011100 HC BRONCHOSCOPY BRUSHINGS: Performed by: INTERNAL MEDICINE

## 2021-10-21 PROCEDURE — 3700000000 HC ANESTHESIA ATTENDED CARE: Performed by: INTERNAL MEDICINE

## 2021-10-21 PROCEDURE — 36415 COLL VENOUS BLD VENIPUNCTURE: CPT

## 2021-10-21 PROCEDURE — 6360000002 HC RX W HCPCS: Performed by: NURSE PRACTITIONER

## 2021-10-21 PROCEDURE — 2580000003 HC RX 258: Performed by: INTERNAL MEDICINE

## 2021-10-21 PROCEDURE — 87040 BLOOD CULTURE FOR BACTERIA: CPT

## 2021-10-21 PROCEDURE — C9113 INJ PANTOPRAZOLE SODIUM, VIA: HCPCS | Performed by: NURSE PRACTITIONER

## 2021-10-21 PROCEDURE — 2500000003 HC RX 250 WO HCPCS: Performed by: NURSE ANESTHETIST, CERTIFIED REGISTERED

## 2021-10-21 PROCEDURE — 99232 SBSQ HOSP IP/OBS MODERATE 35: CPT | Performed by: INTERNAL MEDICINE

## 2021-10-21 PROCEDURE — 83605 ASSAY OF LACTIC ACID: CPT

## 2021-10-21 PROCEDURE — 77334 RADIATION TREATMENT AID(S): CPT

## 2021-10-21 PROCEDURE — 6360000002 HC RX W HCPCS: Performed by: NURSE ANESTHETIST, CERTIFIED REGISTERED

## 2021-10-21 PROCEDURE — 85027 COMPLETE CBC AUTOMATED: CPT

## 2021-10-21 PROCEDURE — 77290 THER RAD SIMULAJ FIELD CPLX: CPT

## 2021-10-21 PROCEDURE — 88342 IMHCHEM/IMCYTCHM 1ST ANTB: CPT

## 2021-10-21 PROCEDURE — 88104 CYTOPATH FL NONGYN SMEARS: CPT

## 2021-10-21 PROCEDURE — 88341 IMHCHEM/IMCYTCHM EA ADD ANTB: CPT

## 2021-10-21 PROCEDURE — 31625 BRONCHOSCOPY W/BIOPSY(S): CPT | Performed by: INTERNAL MEDICINE

## 2021-10-21 PROCEDURE — 88172 CYTP DX EVAL FNA 1ST EA SITE: CPT

## 2021-10-21 PROCEDURE — 85610 PROTHROMBIN TIME: CPT

## 2021-10-21 PROCEDURE — 31652 BRONCH EBUS SAMPLNG 1/2 NODE: CPT | Performed by: INTERNAL MEDICINE

## 2021-10-21 PROCEDURE — 2720000010 HC SURG SUPPLY STERILE: Performed by: INTERNAL MEDICINE

## 2021-10-21 PROCEDURE — 6360000002 HC RX W HCPCS: Performed by: STUDENT IN AN ORGANIZED HEALTH CARE EDUCATION/TRAINING PROGRAM

## 2021-10-21 PROCEDURE — 31623 DX BRONCHOSCOPE/BRUSH: CPT | Performed by: INTERNAL MEDICINE

## 2021-10-21 PROCEDURE — 3603165200 HC BRNCHSC EBUS GUIDED SAMPL 1/2 NODE STATION/STRUX: Performed by: INTERNAL MEDICINE

## 2021-10-21 PROCEDURE — 92526 ORAL FUNCTION THERAPY: CPT

## 2021-10-21 PROCEDURE — 2580000003 HC RX 258: Performed by: NURSE PRACTITIONER

## 2021-10-21 PROCEDURE — 88173 CYTOPATH EVAL FNA REPORT: CPT

## 2021-10-21 PROCEDURE — APPNB180 APP NON BILLABLE TIME > 60 MINS

## 2021-10-21 PROCEDURE — 80048 BASIC METABOLIC PNL TOTAL CA: CPT

## 2021-10-21 PROCEDURE — 0BDB8ZX EXTRACTION OF LEFT LOWER LOBE BRONCHUS, VIA NATURAL OR ARTIFICIAL OPENING ENDOSCOPIC, DIAGNOSTIC: ICD-10-PCS | Performed by: INTERNAL MEDICINE

## 2021-10-21 RX ORDER — PROPOFOL 10 MG/ML
INJECTION, EMULSION INTRAVENOUS CONTINUOUS PRN
Status: DISCONTINUED | OUTPATIENT
Start: 2021-10-21 | End: 2021-10-21 | Stop reason: SDUPTHER

## 2021-10-21 RX ORDER — SODIUM CHLORIDE, SODIUM LACTATE, POTASSIUM CHLORIDE, CALCIUM CHLORIDE 600; 310; 30; 20 MG/100ML; MG/100ML; MG/100ML; MG/100ML
INJECTION, SOLUTION INTRAVENOUS CONTINUOUS
Status: DISCONTINUED | OUTPATIENT
Start: 2021-10-21 | End: 2021-10-22

## 2021-10-21 RX ORDER — FENTANYL CITRATE 50 UG/ML
INJECTION, SOLUTION INTRAMUSCULAR; INTRAVENOUS PRN
Status: DISCONTINUED | OUTPATIENT
Start: 2021-10-21 | End: 2021-10-21 | Stop reason: SDUPTHER

## 2021-10-21 RX ORDER — MIDAZOLAM HYDROCHLORIDE 1 MG/ML
INJECTION INTRAMUSCULAR; INTRAVENOUS PRN
Status: DISCONTINUED | OUTPATIENT
Start: 2021-10-21 | End: 2021-10-21 | Stop reason: SDUPTHER

## 2021-10-21 RX ORDER — ROCURONIUM BROMIDE 10 MG/ML
INJECTION, SOLUTION INTRAVENOUS PRN
Status: DISCONTINUED | OUTPATIENT
Start: 2021-10-21 | End: 2021-10-21 | Stop reason: SDUPTHER

## 2021-10-21 RX ORDER — PROPOFOL 10 MG/ML
INJECTION, EMULSION INTRAVENOUS PRN
Status: DISCONTINUED | OUTPATIENT
Start: 2021-10-21 | End: 2021-10-21 | Stop reason: SDUPTHER

## 2021-10-21 RX ORDER — ONDANSETRON 2 MG/ML
INJECTION INTRAMUSCULAR; INTRAVENOUS PRN
Status: DISCONTINUED | OUTPATIENT
Start: 2021-10-21 | End: 2021-10-21 | Stop reason: SDUPTHER

## 2021-10-21 RX ADMIN — PANTOPRAZOLE SODIUM 40 MG: 40 INJECTION, POWDER, FOR SOLUTION INTRAVENOUS at 08:16

## 2021-10-21 RX ADMIN — ROCURONIUM BROMIDE 50 MG: 10 INJECTION INTRAVENOUS at 11:53

## 2021-10-21 RX ADMIN — ACETAMINOPHEN 650 MG: 325 TABLET ORAL at 16:47

## 2021-10-21 RX ADMIN — SODIUM CHLORIDE, POTASSIUM CHLORIDE, SODIUM LACTATE AND CALCIUM CHLORIDE: 600; 310; 30; 20 INJECTION, SOLUTION INTRAVENOUS at 12:09

## 2021-10-21 RX ADMIN — LEVETIRACETAM 1000 MG: 100 INJECTION, SOLUTION INTRAVENOUS at 04:48

## 2021-10-21 RX ADMIN — LEVETIRACETAM 1000 MG: 100 INJECTION, SOLUTION INTRAVENOUS at 16:45

## 2021-10-21 RX ADMIN — PROPOFOL 50 MG: 10 INJECTION, EMULSION INTRAVENOUS at 12:01

## 2021-10-21 RX ADMIN — FENTANYL CITRATE 100 MCG: 50 INJECTION, SOLUTION INTRAMUSCULAR; INTRAVENOUS at 12:00

## 2021-10-21 RX ADMIN — PROPOFOL 110 MG: 10 INJECTION, EMULSION INTRAVENOUS at 12:00

## 2021-10-21 RX ADMIN — SODIUM CHLORIDE, PRESERVATIVE FREE 10 ML: 5 INJECTION INTRAVENOUS at 08:32

## 2021-10-21 RX ADMIN — SODIUM CHLORIDE, PRESERVATIVE FREE 10 ML: 5 INJECTION INTRAVENOUS at 21:45

## 2021-10-21 RX ADMIN — DEXAMETHASONE SODIUM PHOSPHATE 4 MG: 4 INJECTION, SOLUTION INTRAMUSCULAR; INTRAVENOUS at 05:07

## 2021-10-21 RX ADMIN — PROPOFOL 150 MCG/KG/MIN: 10 INJECTION, EMULSION INTRAVENOUS at 12:16

## 2021-10-21 RX ADMIN — SUGAMMADEX 200 MG: 100 INJECTION, SOLUTION INTRAVENOUS at 13:16

## 2021-10-21 RX ADMIN — MIDAZOLAM HYDROCHLORIDE 2 MG: 2 INJECTION, SOLUTION INTRAMUSCULAR; INTRAVENOUS at 11:53

## 2021-10-21 RX ADMIN — ENOXAPARIN SODIUM 40 MG: 100 INJECTION SUBCUTANEOUS at 08:15

## 2021-10-21 RX ADMIN — DEXAMETHASONE SODIUM PHOSPHATE 4 MG: 4 INJECTION, SOLUTION INTRAMUSCULAR; INTRAVENOUS at 21:46

## 2021-10-21 RX ADMIN — ONDANSETRON 4 MG: 2 INJECTION INTRAMUSCULAR; INTRAVENOUS at 13:20

## 2021-10-21 RX ADMIN — DEXAMETHASONE SODIUM PHOSPHATE 4 MG: 4 INJECTION, SOLUTION INTRAMUSCULAR; INTRAVENOUS at 16:47

## 2021-10-21 ASSESSMENT — PULMONARY FUNCTION TESTS
PIF_VALUE: 19
PIF_VALUE: 18
PIF_VALUE: 21
PIF_VALUE: 23
PIF_VALUE: 19
PIF_VALUE: 18
PIF_VALUE: 1
PIF_VALUE: 1
PIF_VALUE: 0
PIF_VALUE: 4
PIF_VALUE: 0
PIF_VALUE: 19
PIF_VALUE: 1
PIF_VALUE: 28
PIF_VALUE: 0
PIF_VALUE: 18
PIF_VALUE: 21
PIF_VALUE: 8
PIF_VALUE: 2
PIF_VALUE: 11
PIF_VALUE: 16
PIF_VALUE: 18
PIF_VALUE: 22
PIF_VALUE: 18
PIF_VALUE: 19
PIF_VALUE: 1
PIF_VALUE: 13
PIF_VALUE: 22
PIF_VALUE: 26
PIF_VALUE: 1
PIF_VALUE: 1
PIF_VALUE: 21
PIF_VALUE: 18
PIF_VALUE: 1
PIF_VALUE: 2
PIF_VALUE: 0
PIF_VALUE: 25
PIF_VALUE: 1
PIF_VALUE: 25
PIF_VALUE: 27
PIF_VALUE: 0
PIF_VALUE: 1
PIF_VALUE: 21
PIF_VALUE: 24
PIF_VALUE: 1
PIF_VALUE: 22
PIF_VALUE: 20
PIF_VALUE: 15
PIF_VALUE: 27
PIF_VALUE: 1
PIF_VALUE: 2
PIF_VALUE: 21
PIF_VALUE: 19
PIF_VALUE: 31
PIF_VALUE: 1
PIF_VALUE: 25
PIF_VALUE: 15
PIF_VALUE: 34
PIF_VALUE: 19
PIF_VALUE: 17
PIF_VALUE: 22
PIF_VALUE: 8
PIF_VALUE: 1
PIF_VALUE: 1
PIF_VALUE: 18
PIF_VALUE: 20
PIF_VALUE: 2
PIF_VALUE: 22
PIF_VALUE: 19
PIF_VALUE: 17
PIF_VALUE: 5
PIF_VALUE: 19
PIF_VALUE: 18
PIF_VALUE: 18
PIF_VALUE: 2
PIF_VALUE: 17
PIF_VALUE: 16
PIF_VALUE: 18
PIF_VALUE: 1
PIF_VALUE: 17
PIF_VALUE: 16
PIF_VALUE: 20
PIF_VALUE: 1
PIF_VALUE: 0
PIF_VALUE: 18
PIF_VALUE: 1
PIF_VALUE: 1
PIF_VALUE: 19
PIF_VALUE: 2
PIF_VALUE: 9
PIF_VALUE: 0
PIF_VALUE: 19

## 2021-10-21 ASSESSMENT — PAIN DESCRIPTION - DESCRIPTORS
DESCRIPTORS: STABBING

## 2021-10-21 ASSESSMENT — ENCOUNTER SYMPTOMS
RESPIRATORY NEGATIVE: 1
WHEEZING: 0
BACK PAIN: 1
SHORTNESS OF BREATH: 0
EYES NEGATIVE: 1
GASTROINTESTINAL NEGATIVE: 1
CONSTIPATION: 0
DIARRHEA: 0
COUGH: 0
ABDOMINAL PAIN: 0
NAUSEA: 0
VOMITING: 0
ALLERGIC/IMMUNOLOGIC NEGATIVE: 1

## 2021-10-21 ASSESSMENT — PAIN SCALES - GENERAL
PAINLEVEL_OUTOF10: 2
PAINLEVEL_OUTOF10: 3
PAINLEVEL_OUTOF10: 0
PAINLEVEL_OUTOF10: 2
PAINLEVEL_OUTOF10: 2

## 2021-10-21 ASSESSMENT — PAIN - FUNCTIONAL ASSESSMENT
PAIN_FUNCTIONAL_ASSESSMENT: PREVENTS OR INTERFERES SOME ACTIVE ACTIVITIES AND ADLS
PAIN_FUNCTIONAL_ASSESSMENT: PREVENTS OR INTERFERES SOME ACTIVE ACTIVITIES AND ADLS
PAIN_FUNCTIONAL_ASSESSMENT: 0-10

## 2021-10-21 ASSESSMENT — PAIN DESCRIPTION - FREQUENCY
FREQUENCY: INTERMITTENT
FREQUENCY: INTERMITTENT

## 2021-10-21 ASSESSMENT — PAIN DESCRIPTION - ONSET
ONSET: PROGRESSIVE
ONSET: PROGRESSIVE

## 2021-10-21 ASSESSMENT — PAIN DESCRIPTION - LOCATION
LOCATION: SHOULDER
LOCATION: SHOULDER

## 2021-10-21 ASSESSMENT — PAIN DESCRIPTION - ORIENTATION
ORIENTATION: LEFT
ORIENTATION: LEFT

## 2021-10-21 ASSESSMENT — PAIN DESCRIPTION - PROGRESSION
CLINICAL_PROGRESSION: NOT CHANGED
CLINICAL_PROGRESSION: NOT CHANGED

## 2021-10-21 ASSESSMENT — PAIN DESCRIPTION - PAIN TYPE
TYPE: CHRONIC PAIN
TYPE: CHRONIC PAIN

## 2021-10-21 NOTE — CONSULTS
Oncology [facility]  Consult Note      Requesting Physician: Lien Laguerre MD    CHIEF COMPLAINT:  Intracranial Mass; Seizure    HISTORY OF PRESENT ILLNESS:  Mr. Melissa Huang  is a 73 y.o. male we are seeing in consultation for a graphic picture highly suspicious for metastatic lung cancer. The chest CT shows a left hilar mass and the brain MRI shows multiple intracranial metastases. The patient was transferred to [facility] from [facility] due to the presence of multiple brain masses visualized on head CT. He originally presented to the [facility] ER by squad due to a suspected seizure. As noted, he was found to have masses on head CT. He was given dexamethasone and Keppra. A CT chest abdomen and pelvis was obtained that showed a left lower lobe lung mass with extension into the mediastinum. There was also a left adrenal mass. Apparently, in the summer 2021, the patient was evaluated for pneumonia with pleural effusion at [facility]. He underwent thoracentesis on 2 occasions and both times cytology was negative. He was treated with antibiotics and underwent a thoracic surgery procedure which I suspect was decortication. Dr. Keiry Kearns is planning a bronchoscopy with biopsy. Medical oncology is consulted for further recommendations. Past Medical History:    No past medical history on file. Past Surgical History:    No past surgical history on file.     Current Medications:  Current Facility-Administered Medications: dexamethasone (DECADRON) injection 4 mg, 4 mg, IntraVENous, Q6H  pantoprazole (PROTONIX) injection 40 mg, 40 mg, IntraVENous, Daily  levETIRAcetam (KEPPRA) 1,000 mg in sodium chloride 0.9 % 100 mL IVPB, 1,000 mg, IntraVENous, Q12H  sodium chloride flush 0.9 % injection 5-40 mL, 5-40 mL, IntraVENous, 2 times per day  sodium chloride flush 0.9 % injection 5-40 mL, 5-40 mL, IntraVENous, PRN  0.9 % sodium chloride infusion, 25 mL, IntraVENous, PRN  enoxaparin (LOVENOX) injection 40 mg, 40 mg, SubCUTAneous, Daily  ondansetron (ZOFRAN-ODT) disintegrating tablet 4 mg, 4 mg, Oral, Q8H PRN **OR** ondansetron (ZOFRAN) injection 4 mg, 4 mg, IntraVENous, Q6H PRN  polyethylene glycol (GLYCOLAX) packet 17 g, 17 g, Oral, Daily PRN  acetaminophen (TYLENOL) tablet 650 mg, 650 mg, Oral, Q6H PRN **OR** acetaminophen (TYLENOL) suppository 650 mg, 650 mg, Rectal, Q6H PRN  levothyroxine (SYNTHROID) tablet 100 mcg, 100 mcg, Oral, Daily    Allergies:  Patient has no allergy information on record. Social History:      Social History     Socioeconomic History    Marital status:      Spouse name: Not on file    Number of children: Not on file    Years of education: Not on file    Highest education level: Not on file   Occupational History    Not on file   Tobacco Use    Smoking status: Not on file   Substance and Sexual Activity    Alcohol use: Not on file    Drug use: Not on file    Sexual activity: Not on file   Other Topics Concern    Not on file   Social History Narrative    Not on file     Social Determinants of Health     Financial Resource Strain:     Difficulty of Paying Living Expenses:    Food Insecurity:     Worried About Running Out of Food in the Last Year:     [address] in the Last Year:    Transportation Needs:     Lack of Transportation (Medical):      Lack of Transportation (Non-Medical):    Physical Activity:     Days of Exercise per Week:     Minutes of Exercise per Session:    Stress:     Feeling of Stress :    Social Connections:     Frequency of Communication with Friends and Family:     Frequency of Social Gatherings with Friends and Family:     Attends Sabianism Services:     Active Member of Clubs or Organizations:     Attends Club or Organization Meetings:     Marital Status:    Intimate Partner Violence:     Fear of Current or Ex-Partner:     Emotionally Abused:     Physically Abused:     Sexually Abused:        Family History:     No family history on file. REVIEW OF SYSTEMS:    Review of Systems   Constitutional: Positive for fatigue. Negative for fever. HENT: Negative. Eyes: Negative. Respiratory: Negative. Cardiovascular: Negative. Gastrointestinal: Negative. Endocrine: Negative. Genitourinary: Negative. Musculoskeletal: Negative. Skin: Negative. Neurological: Negative. Negative for seizures, weakness and headaches. PHYSICAL EXAM:      Vitals:  /83   Pulse 60   Temp 97 °F (36.1 °C) (Axillary)   Resp 16   Wt 210 lb 15.7 oz (95.7 kg)   SpO2 95%   BMI 27.09 kg/m²       Intake/Output Summary (Last 24 hours) at 10/21/2021 0644  Last data filed at 10/20/2021 1800  Gross per 24 hour   Intake --   Output 425 ml   Net -425 ml       Physical Exam  Constitutional:       General: He is not in acute distress. Eyes:      General: No scleral icterus. Cardiovascular:      Rate and Rhythm: Normal rate and regular rhythm. Heart sounds: No murmur heard. No gallop. Pulmonary:      Effort: Pulmonary effort is normal.      Breath sounds: Normal breath sounds. No wheezing or rales. Abdominal:      Palpations: Abdomen is soft. Tenderness: There is no abdominal tenderness. Musculoskeletal:         General: No swelling. Lymphadenopathy:      Cervical: No cervical adenopathy. Skin:     General: Skin is warm and dry. Findings: No rash. Neurological:      General: No focal deficit present. Mental Status: He is alert and oriented to person, place, and time.        DATA:  Recent Labs     10/21/21  0451 10/20/21  0614   WBC 9.9 12.0*   NEUTROABS  --  9.7*   LYMPHOPCT  --  10.1   RBC 4.68 4.90   HGB 13.3* 13.9   HCT 40.5 42.2   MCV 86.4 86.2   MCH 28.4 28.3   MCHC 32.9 32.8   RDW 16.4* 16.4*    283       Lab Results   Component Value Date     10/21/2021    K 4.1 10/21/2021     10/21/2021    CO2 24 10/21/2021    GLUCOSE 131 (H) 10/21/2021    BUN 24 (H) 10/21/2021    CREATININE attenuating areas within the right frontal and right temporoparietal lobes likely due to vasogenic edema from malignancy rather than age-indeterminate infarcts. Recommend further evaluation with MRI of the head with and without contrast for further evaluation. CT CERVICAL SPINE WO CONTRAST    Result Date: 10/20/2021  EXAMINATION: CT OF THE CERVICAL SPINE WITHOUT CONTRAST 10/20/2021 7:08 am: TECHNIQUE: CT of the cervical spine was performed without the administration of intravenous contrast. Multiplanar reformatted images are provided for review. Dose modulation, iterative reconstruction, and/or weight based adjustment of the mA/kV was utilized to reduce the radiation dose to as low as reasonably achievable. COMPARISON: 03/12/2007 HISTORY: ORDERING SYSTEM PROVIDED HISTORY: Earl Sauceda ro fx TECHNOLOGIST PROVIDED HISTORY: Reason for exam:->kerry perez ro fx Decision Support Exception - unselect if not a suspected or confirmed emergency medical condition->Emergency Medical Condition (MA) Reason for Exam: kerry cornell Acuity: Acute Type of Exam: Initial FINDINGS: BONES/ALIGNMENT: There is no acute fracture. The 7 cervical vertebral bodies are in anatomic alignment. The craniocervical junction is intact. DEGENERATIVE CHANGES: There is moderate multilevel spondylosis and facet arthropathy. There is mild widening of the right C3-4 facet with mild irregularity of the articular surface which is likely due to degenerative changes as opposed to a traumatic injury. SOFT TISSUES: There is no prevertebral soft tissue swelling. Mild emphysema involves the bilateral upper lungs. There is bibasilar atelectasis with mild interstitial edema. 1. No acute fracture. If the patient continues to experience pain, recommend MRI of the cervical spine for further evaluation.      CT THORACIC SPINE WO CONTRAST    Result Date: 10/20/2021  EXAMINATION: CT OF THE THORACIC SPINE WITHOUT CONTRAST  10/20/2021 7:05 am: TECHNIQUE: CT of the thoracic spine was performed without the administration of intravenous contrast. Multiplanar reformatted images are provided for review. Dose modulation, iterative reconstruction, and/or weight based adjustment of the mA/kV was utilized to reduce the radiation dose to as low as reasonably achievable. COMPARISON: None. HISTORY: ORDERING SYSTEM PROVIDED HISTORY: kerry cornell TECHNOLOGIST PROVIDED HISTORY: Reason for exam:->kerry cornell Reason for Exam: kerry cornell Acuity: Acute Type of Exam: Initial FINDINGS: BONES/ALIGNMENT: There is normal alignment of the spine. The vertebral body heights are maintained. No osseous destructive lesion is seen. DEGENERATIVE CHANGES: Mild-to-moderate spondylosis of the spine with marginal osteophytes most significant in the mid to lower thoracic spine. No bony spinal canal stenosis. No significant listhesis. SOFT TISSUES: No paraspinal mass is seen. Multifocal consolidations most significant in the left lower lobe concerning for pneumonia. Other etiologies not excluded. No acute osseous abnormality. Moderate spondylosis. Consolidations within the visualized lung fields concerning for pneumonia with indeterminate left infrahilar consolidation. Recommend dedicated CT chest.     CT CHEST ABDOMEN PELVIS W CONTRAST    Result Date: 10/20/2021  EXAMINATION: CT OF THE CHEST, ABDOMEN, AND PELVIS WITH CONTRAST 10/20/2021 7:04 am; 10/20/2021 7:05 am TECHNIQUE: CT of the chest, abdomen and pelvis was performed with the administration of intravenous contrast. Multiplanar reformatted images are provided for review. Dose modulation, iterative reconstruction, and/or weight based adjustment of the mA/kV was utilized to reduce the radiation dose to as low as reasonably achievable.  COMPARISON: None HISTORY: ORDERING SYSTEM PROVIDED HISTORY: amd . trauma,, zx, pain ro omid oliverio Qi Numbers, ? free air divertic TECHNOLOGIST PROVIDED HISTORY: Reason for exam:->amd . trauma,, zx, pain ro omid oliverio Aide Cali, ? free air divertic Additional Contrast?->None Decision Support Exception - unselect if not a suspected or confirmed emergency medical condition->Emergency Medical Condition (MA) Reason for Exam: amd . trauma,, zx, pain ro omid oliverio Aide Cali, ? free air divertic Acuity: Acute Type of Exam: Initial; ORDERING SYSTEM PROVIDED HISTORY: kerry cornell TECHNOLOGIST PROVIDED HISTORY: Reason for exam:->kerry cornell Reason for Exam: kerry cornell Acuity: Acute Type of Exam: Initial CHEST: Mediastinum: 1. Mass surrounds the left hilum inferiorly. This could obscure associated infrahilar adenopathy. No significant mediastinal or right hilar adenopathy is seen otherwise. 2. The heart is mildly enlarged. There is no pericardial effusion. Coronary calcification is seen primarily LAD. 3. The aorta and pulmonary vessels are unremarkable for age. Lungs/pleura: 1. A large mass is seen surrounding and extending inferior to the left hilum. This measures 6.4 x 4.4 cm. Subtle mass protrudes into the left lower lobe bronchus, sample series 4, image 63. This is better seen on coronal image 82. This could potentially represent mucoid material.  In addition there is airspace disease peripherally into the lower lobe likely some degree of postobstructive volume loss or pneumonia. Pleural effusion is also present possibly malignant. 2. There is patchy bilateral airspace disease elsewhere greatest involving the right lower lobe posteriorly. This could be atelectatic disease however pneumonia or aspiration is also considered. 3. No pneumothorax. This is in spite of rib fractures as described below. Soft Tissues/Bones: Bilateral rib fractures are present. Some of these are better seen on sagittal projection due to the fracture being within the axial plane. This involves the anterolateral right 3rd and 4th ribs as well as the left lateral 7th rib. Abdomen/Pelvis: Organs; 1. Liver:  The density is unremarkable with no focal suspicious liver lesion. 2. Gallbladder: Gallstones are seen within the gallbladder without significant wall thickening. There is no biliary dilation. 3. Spleen: Normal. 4. Pancreas: Normal. 5. Kidneys: Unremarkable. No hydronephrosis or suspicious lesion. 6. Adrenal glands: Large mass is seen involving the left adrenal gland measuring 4.6 x 4.4 cm. The right adrenal gland is minimally enlarged. GI/Bowel: There is no distention, obstruction or significant inflammatory change. Multiple diverticuli are scattered throughout the colon, greater descending and sigmoid colon. There is some stool within the colon without distention. The appendix is normal. Pelvis: Urinary bladder is mostly empty with a Cano catheter present. There is no free pelvic fluid. Peritoneum/Retroperitoneum: No additional mass or retroperitoneal adenopathy. No ascites. Soft Tissues/Bones: No acute abnormality. .     Large left infrahilar mass highly suspicious for primary lung carcinoma with metastatic disease suspected to the left adrenal gland and possible malignant pleural effusion. Mass versus mucoid material is seen in the left lower bronchus focally. Postobstructive airspace disease could represent pneumonia or atelectasis. Bilateral anterolateral rib fractures as identified above without pneumothorax. Cholelithiasis. Patchy airspace disease right lower lung possible pneumonia versus aspiration versus atelectasis. MRI BRAIN W WO CONTRAST    Result Date: 10/20/2021  EXAMINATION: MRI OF THE BRAIN WITHOUT AND WITH CONTRAST  10/20/2021 11:09 am TECHNIQUE: Multiplanar multisequence MRI of the head/brain was performed without and with the administration of intravenous contrast. COMPARISON: CT head 10/20/2021.  HISTORY: ORDERING SYSTEM PROVIDED HISTORY: right frontal mass, ro mass TECHNOLOGIST PROVIDED HISTORY: Reason for exam:->right frontal mass, ro mass Reason for Exam: EVALUATE STATUS WITH NEW ONSET OF BRAIN METS Acuity: Acute Type of Exam: Initial Additional signs and symptoms: EVALUATE STATUS WITH NEW ONSET OF BRAIN METS Relevant Medical/Surgical History: EVALUATE STATUS WITH NEW ONSET OF BRAIN METS FINDINGS: INTRACRANIAL STRUCTURES/VENTRICLES:  There is no acute infarct. The ventricles and cisternal spaces are normal in size, shape, and configuration for the age of the patient. There are multiple areas of abnormal signal scattered throughout the brain parenchyma consistent with metastatic disease. There are at least 4 cerebellar lesions. There are lesions in the right occipital lobe, right temporal lobe, right parietal lobe and right frontal lobe. There are lesions in the left parietal lobe and the medial aspect of the left thalamus protruding into the ventricle. There are approximately 18 metastatic lesions scattered throughout the brain. There is mild edema associated with the lesions. There is no significant midline shift. There are mild chronic small vessel ischemic changes. ORBITS: The visualized portion of the orbits demonstrate no acute abnormality. SINUSES: There is mild mucoperiosteal thickening of the ethmoid air cells. There are mucous retention cysts or polyps in the maxillary sinuses. The remainder of the sinuses are clear. The mastoid air cells are clear. BONES/SOFT TISSUES: The bone marrow signal intensity appears normal. The soft tissues demonstrate no acute abnormality. Multiple brain parenchymal metastases in the supratentorial and infratentorial brain as discussed above. There are approximately 18 metastatic lesions scattered throughout the brain. The largest lesion is in the right temporal lobe and measures 1.4 cm. The 2nd largest lesion is a right frontal parietal lesion measuring 1.2 cm near the midline. There is mild edema associated with many of the lesions without midline shift or significant mass effect.      CT LUMBAR SPINE TRAUMA RECONSTRUCTION    Result Date: 10/20/2021  EXAMINATION: CT OF THE LUMBAR SPINE WITHOUT CONTRAST  10/20/2021 TECHNIQUE: CT of the lumbar spine was performed without the administration of intravenous contrast. Multiplanar reformatted images are provided for review. Adjustment of mA and/or kV according to patient size was utilized. Dose modulation, iterative reconstruction, and/or weight based adjustment of the mA/kV was utilized to reduce the radiation dose to as low as reasonably achievable. COMPARISON: None HISTORY: ORDERING SYSTEM PROVIDED HISTORY: TRAUMA TECHNOLOGIST PROVIDED HISTORY: Reason for exam:->trauma, sz, pain Reason for Exam: traumna, sz Acuity: Acute Type of Exam: Initial FINDINGS: BONES/ALIGNMENT: There is normal alignment of the spine. The vertebral body heights are maintained. No osseous destructive lesion is seen. DEGENERATIVE CHANGES: Multilevel degenerative changes. SOFT TISSUES/RETROPERITONEUM: No paraspinal mass is seen.      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 and multiple intracranial metastases  - Pleural effusion  - Primary lung cancer is suspected  - Bronchoscopy with biopsy is planned  - Pathology awaited  - Once we have pathology, we will also need molecular profiling  - Systemic treatment options will be dependent upon pathology and molecular profiling    Intracranial metastases  -I doubt he is a candidate for neurosurgical resection as this is multifocal disease  -I will consult radiation oncology as I suspect he will require whole brain radiation  -Agree with steroids and Keppra    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 D/W Dr Farooq Lowery    Thank you for the consultation.     Unique Hugo MD  10/21/2021  6:44 AM

## 2021-10-21 NOTE — PROCEDURES
PROCEDURE: Fiberoptic bronchoscopy with endobronchial ultrasound-guided biopsy of left infrahilar mediastinal mass, brush biopsy of left lower lobe mass, and forcep biopsy of left lower lobe mass    DESCRIPTION OF PROCEDURE: Informed consent was obtained from the patient after explaining the risks and benefits. A time out was taken. Total intravenous anesthesia was kindly provided by the anesthetist.     Initially a standard bronchoscope was used to perform a complete airway inspection. The bronchscope was inserted into a 8.5 ETT and into the trachea. The trachea appeared normal.The right bronchial tree was examined to the subsegmental level. There was no masses or mucosal abnormalities seen and there was no significant secretions. The left bronchial tree was examined to the subsegmental level. There was submucosal mass visualized at the distal left mainstem bronchus that was compressing opening to the left lower lobe by approximately 60%. The mucosa was still intact. I was able to pass the obstruction and get into the left lower lobe. Proximal portion of the superior segment of the left lower lobe was visualized and was without abnormality. However proximal airways to the anterior lateral and posterior segments of the left lower lobe were narrowed with visible mass. I was unable to get into those segments with the bronchoscope      1. Brushings were obtained left lower lobe after takeoff of superior segment  2. Endobronchial biopsies x4 were obtained from left lower lobe after takeoff of superior segment    The diagnostic bronchoscope was removed and bronchoscope with endobronchial ultrasound was inserted through the ETT. Direct visualization of the lymph nodes was obtained using endobronchial ultrasound (EBUS) guidance. 1.  FNA x 4 of infrahilar mediastinal mass located under the distal left mainstem bronchus      The patient tolerated the procedure well.   No complications noted  Recovery will be in the ICU.     EBL 35 ml    Masha Streeter MD

## 2021-10-21 NOTE — CONSULTS
Neurology & Neurocritical Care Consult Note    Dr. Johnny Gibbs requesting this consult. CC: Seizure  Reason for Consult: New onset seizure, metastatic lesions found in brain  HPI:   Ania Vines is a 73 y.o. male with PHx sig for HTN, prostate CA, hypothyroidism, L rotator cuff tendinitis, empyema of L lung. Per my interview with the patient, he does not remember coming to the hospital. The first thing he remembers is seeing his wife leave last night. History obtained per chart review. He presented to the [place] ER after a suspected witnessed seizure. The patient wife around 5 am on 10/20 th patient had L arm jerking and flailing he then became increasingly agitated and EMS was called. He required 10 mg of versed and restraints when EMS arrived, and he reportedly urinated on himself during this event. No evidence of tongue biting. He continued to be agitated in the OSH ER, and required ketamine and haldol as well as a precedex gtt that was stopped due to bradycardia. He has never had seizures in the past, but the wife reports increased agitation over the past month or so. A CT of his head revealed a suspected frontal mass with potentially multiple other lesions. 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 CT chest showed a L hilum mass. He had a LP completed in the ER which showed slightly elevated protein and glucose levels and no WBCs. He was transferred to [facility], [facility].. This morning he is alert, oriented, and pleasant. He is able to follow commands and reports that his only complaint currently is his L shoulder pain. Reports that he has lost about 23 lbs over the past few months, which he attributes to walking 2 miles every day and \"eating what I'm supposed to eat\".     He is unsure what would have precipitated these symptoms, other than the above. he feels that nothing makes them better and nothing makes them worse. he rates the symptoms as severe. he denies any other associated symptoms including no HA, no speech/language difficulties, no swallowing difficulties, no visual changes, no diplopia, no hearing loss, no tinnitus, no vertigo, no imbalance, no light-headedness, no focal weakness, no sensory changes, no nausea or vomiting. he has never had this problem before. There is no history of this problem or anything similar in his family members. PAST MEDICAL HISTORY:   has no past medical history on file. Patient Active Problem List   Diagnosis    Intracranial mass       SURGICAL HISTORY:  No past surgical history on file. FAMILY HISTORY:  family history is not on file. SOCIAL HISTORY:  he     Social History     Socioeconomic History    Marital status:      Spouse name: Not on file    Number of children: Not on file    Years of education: Not on file    Highest education level: Not on file   Occupational History    Not on file   Tobacco Use    Smoking status: Not on file   Substance and Sexual Activity    Alcohol use: Not on file    Drug use: Not on file    Sexual activity: Not on file   Other Topics Concern    Not on file   Social History Narrative    Not on file     Social Determinants of Health     Financial Resource Strain:     Difficulty of Paying Living Expenses:    Food Insecurity:     Worried About Running Out of Food in the Last Year:     [address] in the Last Year:    Transportation Needs:     Lack of Transportation (Medical):      Lack of Transportation (Non-Medical):    Physical Activity:     Days of Exercise per Week:     Minutes of Exercise per Session:    Stress:     Feeling of Stress :    Social Connections:     Frequency of Communication with Friends and Family:     Frequency of Social Gatherings with Friends and Family:     Attends Yazidism Services:     Active Member of Clubs or Organizations:     Attends Club or Organization Meetings:     Marital Status:    Intimate Partner Violence:     Fear of Current or Ex-Partner:     Emotionally Abused:     Physically Abused:     Sexually Abused:        HOME MEDICATIONS:  No medications prior to admission. CURRENT MEDICATIONS:    Current Facility-Administered Medications:     dexamethasone (DECADRON) injection 4 mg, 4 mg, IntraVENous, Q6H, ЕЛЕНА Ramirez NP, 4 mg at 10/21/21 0507    pantoprazole (PROTONIX) injection 40 mg, 40 mg, IntraVENous, Daily, ЕЛЕНА Ramirez NP, 40 mg at 10/20/21 1755    levETIRAcetam (KEPPRA) 1,000 mg in sodium chloride 0.9 % 100 mL IVPB, 1,000 mg, IntraVENous, Q12H, ЕЛЕНА Ramirez NP, Paused at 10/21/21 0506    sodium chloride flush 0.9 % injection 5-40 mL, 5-40 mL, IntraVENous, 2 times per day, Pato Mayo DO, 10 mL at 10/20/21 2319    sodium chloride flush 0.9 % injection 5-40 mL, 5-40 mL, IntraVENous, PRN, Lizzeth Maloney Do, DO    0.9 % sodium chloride infusion, 25 mL, IntraVENous, PRN, Lizzeth Maloney Do, DO    enoxaparin (LOVENOX) injection 40 mg, 40 mg, SubCUTAneous, Daily, Pato Mayo DO, 40 mg at 10/20/21 1755    ondansetron (ZOFRAN-ODT) disintegrating tablet 4 mg, 4 mg, Oral, Q8H PRN **OR** ondansetron (ZOFRAN) injection 4 mg, 4 mg, IntraVENous, Q6H PRN, Lizzeth Maloney Do, DO    polyethylene glycol (GLYCOLAX) packet 17 g, 17 g, Oral, Daily PRN, Lizzeth Maloney Do, DO    acetaminophen (TYLENOL) tablet 650 mg, 650 mg, Oral, Q6H PRN **OR** acetaminophen (TYLENOL) suppository 650 mg, 650 mg, Rectal, Q6H PRN, Lizzeth Maloney Do, DO    levothyroxine (SYNTHROID) tablet 100 mcg, 100 mcg, Oral, Daily, Pato Mayo DO      ROS:   Constitutional- + weight loss or fevers   Eyes- No diplopia. No photophobia. Ears/nose/throat- No dysphagia. No Dysarthria   Cardiovascular- No palpitations. No chest pain   Respiratory- No dyspnea. No Cough   Gastrointestinal- No Abdominal pain. No Vomiting.    Genitourinary- No the   head with and without contrast for further evaluation. MRI wo contrast:   Impression   Multiple brain parenchymal metastases in the supratentorial and   infratentorial brain as discussed above.  There are approximately 18   metastatic lesions scattered throughout the brain.  The largest lesion is in   the right temporal lobe and measures 1.4 cm.  The 2nd largest lesion is a   right frontal parietal lesion measuring 1.2 cm near the midline.  There is   mild edema associated with many of the lesions without midline shift or   significant mass effect. Pertinent Labs:   Ref. Range 10/20/2021 08:12 10/20/2021 08:12   Appearance, CSF Latest Ref Range: Clear  Clear Clear   Clot Evaluation CSF Unknown see below see below   CSF Culture Unknown No Growth so far. Hold 7 days. Glucose, CSF Latest Ref Range: 40 - 80 mg/dL 90 (H)    No differential CSF Unknown see below see below   Protein, CSF Latest Ref Range: 15 - 45 mg/dL 50 (H)    RBC, CSF Latest Ref Range: 0 /cumm 0 0   WBC, CSF Latest Ref Range: 0 - 5 /cumm 0 0   Color, CSF Latest Ref Range: Colorless  Colorless Colorless   Tube Number + CELL CT + DIFF-CSF Unknown Tube 1 Tube 4     Cytology: No malignant cells    Assessment:  Heraclio Kam is a 73 y.o. male with PHx sig for HTN, prostate CA, hypothyroidism, L rotator cuff tendinitis, empyema of L lung. On 10/20 at 5 am, he was noted by his wife LUE shaking which progressed to whole body shaking. EMS was called, and patient was extremely agitated after event, requiring multiple doses of sedation. He received a CT head as well as a MRI which showed multiple brain parenchymal metastases. On 10/21 he is now alert, oriented, pleasant and cooperative.      Plan:  - Continue decadron 4 mg every 6 hours  - Q 4 hour neuro checks  - Continue Keppra 1000 mg BID  - Routine EEG completed, awaiting read  - Neurosurgery, Oncology, and Radiation Oncology following, appreciate recs  - HOB > 30 degrees, keep sodium WNL, no dextrose in IVF or IV drips  - Bronch today for lung mass  - Seizure precautions    ЕЛЕНА Renteria-CNP  Neurology & Neurocritical Care   Neurology Line: [phone]  [facility]: [facility] Neurology & Neuro Critical Care NPs

## 2021-10-21 NOTE — PROGRESS NOTES
NEUROSURGERY PROGRESS NOTE    Nissa Collier   [id number]   1950   10/21/2021    Interval History: admitted with seizure, multiple brain masses found on MRI- large lung mass on CT chest. No seizure activity overnight. Neuro exam stable. Hospital Day #1    Subjective: Patient resting in bed, complains of left shoulder pain. Dr. Candis Emmanuel at bedside this morning, discussed MRI findings with patient and plan of care moving forward. Objective:  /83   Pulse 62   Temp 97.9 °F (36.6 °C) (Oral)   Resp 25   Wt 210 lb 15.7 oz (95.7 kg)   SpO2 98%   BMI 27.09 kg/m²     Labs:  Recent Labs     10/20/21  0614 10/21/21  0451    140    104   CO2 19* 24   BUN 24* 24*   CREATININE 1.0 0.7*   GLUCOSE 185* 131*     Recent Labs     10/20/21  0614 10/21/21  0451   WBC 12.0* 9.9   RBC 4.90 4.68   INR  --  0.96     Radiological Findings:  CT head wo contrast  10/20/2021 0706  Impression 1. Low attenuating areas within the right frontal and right temporoparietal lobes likely due to vasogenic edema from malignancy rather than age-indeterminate infarcts.  Recommend further evaluation with MRI of the head with and without contrast for further evaluation.        CT chest abdomen pelvis   10/20/2021 0708  Impression Large left infrahilar mass highly suspicious for primary lung carcinoma with metastatic disease suspected to the left adrenal gland and possible malignant pleural effusion.  Mass versus mucoid material is seen in the left lower bronchus focally.  Postobstructive airspace disease could represent pneumonia or atelectasis.   Bilateral anterolateral rib fractures as identified above without pneumothorax.   Cholelithiasis.   Patchy airspace disease right lower lung possible pneumonia versus aspiration versus atelectasis.      MRI brain w wo contrast  10/20/2021 1216  Impression Multiple brain parenchymal metastases in the supratentorial and  infratentorial brain as discussed above.   There are approximately 18  metastatic lesions scattered throughout the brain. The largest lesion is in  the right temporal lobe and measures 1.4 cm. The 2nd largest lesion is a  right frontal parietal lesion measuring 1.2 cm near the midline. There is  mild edema associated with many of the lesions without midline shift or  significant mass effect. Neurologic Exam:  GCS:  4 - Opens eyes on own  5 - Alert and oriented  6 - Follows simple motor commands    Mental Status: Awake, alert, oriented x 4  Language: No aphasia or dysarthria noted  Sensation: Intact to all extremities to light touch  Coordination: Intact    Cranial Nerves:  II: Visual acuity not tested, visual fields intact, denies new visual changes / diplopia  III, IV, VI: PERRL, 3 mm bilaterally, EOMI, no nystagmus noted  V: Facial sensation intact bilaterally to touch  VII: Face symmetric  VIII: Hearing intact bilaterally to spoken voice  IX: Palate movement equal bilaterally  XI: Shoulder shrug equal bilaterally  XII: Tongue midline    Musculoskeletal:   Gait: Not tested   Tone: normal   Motor strength:    Right  Left    Right  Left    Deltoid  5 4+  Hip Flex  5 5   Biceps  5 4+  Knee Extensors  5 5   Triceps  5 4+  Knee Flexors  5 5   Wrist Ext  5 5  Ankle Dorsiflex. 5 5   Wrist Flex  5 5  Ankle Plantarflex. 5 5   Handgrip  5 5  Ext Amari Longus  5 5   Thumb Ext  5 5           Assessment   74 yo male who presents today with seizure at home. MRI brain demonstrates approximately 18  metastatic lesions scattered throughout the brain. CT chest with evidence of large left infrahilar mass- concerning for primary lung carcinoma.       Plan:  1. No emergent neurosurgical intervention indicated, given size and number of brain metastases recommend treatment of brain lesions with radiation therapy   2. ICU team planning for bronchoscopy for biopsy of lung mass today   3. q 4 hour neuro checks  4.  Cerebral edema:  -Decadron 4 mg q 6 hours, PPI/SSI (will defer taper to rad onc)  -Keep sodium WNL  -HOB elevated  -No dextrose in IVF or IV drips   5. Seizure prophylaxis- defer to neurology, appreciate assistance   6. Oncology and radiation oncology consults placed  7. Activity/diet/pain control per primary team  8. OK for DVT chemoprophylaxis from neurosurgical standpoint  9. We will sign off at this time. Please do not hesitate to call with any questions. DISPO-Dispo timing to be determined by primary team once patient is medically stable for discharge. ЕЛЕНА Eugene-CNP  Neurosurgery Nurse Practitioner  [phone]  Patient was seen and examined with Dr. Zora Early who agrees with above assessment and plan. Electronically signed by:  ЕЛЕНА Bar NP, 10/21/2021 10:29 AM

## 2021-10-21 NOTE — PLAN OF CARE
Problem: Pain:  Description: Pain management should include both nonpharmacologic and pharmacologic interventions.   Goal: Pain level will decrease  Description: Pain level will decrease  Outcome: Ongoing     Problem: Falls - Risk of:  Goal: Will remain free from falls  Description: Will remain free from falls  Outcome: Ongoing     Problem: Skin Integrity:  Goal: Will show no infection signs and symptoms  Description: Will show no infection signs and symptoms  Outcome: Ongoing

## 2021-10-21 NOTE — CARE COORDINATION
Case Management Assessment           Initial Evaluation                Date / Time of Evaluation: 10/21/2021 2:46 PM                 Assessment Completed by: Rozina Garcia RN     Patient is from home with his spouse and was independent prior. He plans on returning to home with his spouse at d/c and denies needs from . They live in a ranch style home with a basement and they typically enter through the basement and have about 12 steps to get to the main level. He had no services or DME prior. They have 5 steps with a railing at the front. His wife can transport him to home at d/c. Patient Name: Melina Dorado     YOB: 1950  Diagnosis: Intracranial mass [R90.0]     Date / Time: 10/20/2021  3:37 PM    Patient Admission Status: Inpatient    If patient is discharged prior to next notation, then this note serves as note for discharge by case management. Current PCP: [address] Patient: No    Chart Reviewed: Yes  Patient/ Family Interviewed: Yes    Initial assessment completed at bedside with: patient and spouse    Hospitalization in the last 30 days: No    Emergency Contacts:  Extended Emergency Contact Information  Primary Emergency Contact: Laila Romo  Address: [address] [place] [address]  Home Phone: [phone]  Work Phone: [phone]  Relation: Spouse  Secondary Emergency Contact: Kemal Couch  Home Phone: [phone]  Relation: Child    Advance Directives:   Code Status: [address]: No  Agent: NA  Contact Number: NA      Financial  Payor: MEDICARE / Plan: MEDICARE PART A AND B / Product Type: *No Product type* /     Pre-cert required for SNF: Yes    Pharmacy  No Pharmacies Listed    Potential assistance Purchasing Medications:    Does Patient want to participate in local refill/ meds to beds program?:      Meds To Beds General Rules:  1. Can ONLY be done Monday- Friday between 8:30am-5pm  2.  Prescription(s) must be in pharmacy by 3pm to be filled same day  3. Copy of patient's insurance/ prescription drug card and patient face sheet must be sent along with the prescription(s)  4. Cost of Rx cannot be added to hospital bill. If financial assistance is needed, please contact unit  or ;  or  CANNOT provide pharmacy voucher for patients co-pays  5. Patients can then  the prescription on their way out of the hospital at discharge, or pharmacy can deliver to the bedside if staff is available. (payment due at time of pick-up or delivery - cash, check, or card accepted)     Able to afford home medications/ co-pay costs: Yes    ADLS  Support Systems:      PT AM-PAC:   /24  OT AM-PAC:   /24    [place]: ranch style with a basement  Steps: 5 steps in the front and 12 from the basement    Plans to RETURN to current housing: Yes  Barriers to RETURNING to current housing: none noted      DISCHARGE PLAN:  Disposition: Home- No Services Needed    Transportation PLAN for discharge: family     Factors facilitating achievement of predicted outcomes: Family support, Cooperative, Pleasant and Sense of humor    Barriers to discharge: working on treatment plan    Additional Case Management Notes: NA    The Plan for Transition of Care is related to the following treatment goals of Intracranial mass [R90.0]    The Patient and/or patient representative Aline Aquino and his family were provided with a choice of provider and agrees with the discharge plan Not Indicated    Freedom of choice list was provided with basic dialogue that supports the patient's individualized plan of care/goals and shares the quality data associated with the providers.  Not Indicated    Care Transition patient: No    Be Barnett RN  [facility] [employer]  Case Management Department  Ph: [phone]   Fax: [phone]

## 2021-10-21 NOTE — PROGRESS NOTES
Progress Note  Admit Date: 10/20/2021           73 y.o. male w/ PMH of HTN, prostate CA, hypothyroidism, left rotator cuff tendinitis, empyema of left lung who presented to [facility] on 10/20/2021 with confusion and seizure. Was admitted to the ICU. Interval History:   No  new symptoms   Mentation is much better than on admission  He has no recollection of what happened yesterday  No chest pain  No overt seizures  No fevers      Review of Systems - Negative except as in HPI. Scheduled Medications:    dexamethasone  4 mg IntraVENous Q6H    pantoprazole  40 mg IntraVENous Daily    levetiracetam  1,000 mg IntraVENous Q12H    sodium chloride flush  5-40 mL IntraVENous 2 times per day    enoxaparin  40 mg SubCUTAneous Daily    levothyroxine  100 mcg Oral Daily      PRN Medications: sodium chloride flush, sodium chloride, ondansetron **OR** ondansetron, polyethylene glycol, acetaminophen **OR** acetaminophen  Diet: Diet NPO Exceptions are: Sips of Water with Meds    Continuous Infusions:   lactated ringers Stopped (10/21/21 1325)    sodium chloride         PHYSICAL EXAM:  /83   Pulse 62   Temp 98.3 °F (36.8 °C) (Tympanic)   Resp 24   Wt 210 lb 15.7 oz (95.7 kg)   SpO2 94%   BMI 27.09 kg/m²   No results for input(s): POCGLU in the last 72 hours.     Intake/Output Summary (Last 24 hours) at 10/21/2021 1654  Last data filed at 10/21/2021 1430  Gross per 24 hour   Intake 910 ml   Output 1425 ml   Net -515 ml       General appearance: alert, appears stated age and cooperative  Head: Normocephalic, without obvious abnormality, atraumatic  Neck: no adenopathy, no carotid bruit, no JVD, supple, symmetrical, trachea midline and thyroid not enlarged, symmetric, no tenderness/mass/nodules  Lungs: clear to auscultation bilaterally  Heart: regular rate and rhythm, S1, S2 normal, no murmur, click, rub or gallop  Abdomen: soft, non-tender; bowel sounds normal; no masses,  no organomegaly  Extremities: extremities normal, atraumatic, no cyanosis or edema  Pulses: 2+ and symmetric  Neurologic: Grossly normal      LABS:  Recent Labs     10/20/21  0614 10/21/21  0451   WBC 12.0* 9.9   HGB 13.9 13.3*   HCT 42.2 40.5    209                                                                    Recent Labs     10/20/21  0614 10/21/21  0451    140   K 4.5 4.1    104   CO2 19* 24   BUN 24* 24*   CREATININE 1.0 0.7*   GLUCOSE 185* 131*     Recent Labs     10/20/21  0614   AST 17   ALT 20   BILITOT 0.3   ALKPHOS 86     Recent Labs     10/20/21  0614   TROPONINI <0.01     No results for input(s): BNP in the last 72 hours. No results for input(s): CHOL, HDL in the last 72 hours. Invalid input(s): LDLCALCU  Recent Labs     10/21/21  0451   INR 0.96       Assessment & Plan:    73 y.o. male transferred from [facility] on 10/20/2021 with confusion and seizure. Intracranial mass, Non traumatic with vasogenic edema  - Presented to the ED at [facility] with mental status changes.  - Noted with encephalopathy and probable seizures from brain mass with vasogenic edema  - Transferred here for further mgt  - Continue Keppra  - Continue Decadron  - NSGY evaluated: No emergent neurosurgical intervention indicated, given size and number of brain metastases recommend treatment of brain lesions with radiation therapy   - Seen by Rad/onc:  Whole brain radiation therapy planned, to start once his pathology is back confirming malignancy.   Planned for [address] office as he lives in [facility]      Seizures  Lactic acidosis  -Seizure secondary to multiple intracranial metastases  - Lactic acidosis likely sec to seizures  - Continue Decadron and Keppra  - Neurology eval       Acute metabolic encephalopathy secondary to seizures, brain mass with cerebral edema  -Continue Decadron and Keppra  -Appears mental status improving  - PT/OT eval      Lung mass, large left infrahilar mass   Pleural effusion, possibly malignant effusion  Left adrenal mass  Multiple intracranial masses  - Probably metastatic disease, probably lung primary  - S/p Bronchoscopy with biopsy- Pathology awaited      Recent txt for Empyema of left pleural space sp L open decortication August 2021  Right LL airspace disease  - Initial concern for infection, post-obstructive pneumonia: ruled out  - WBC 12 on admission, Now WNL without antibx  - On room air with an oxygen saturation of 100%  - Blood cultures  - Monitor vitals and labs      Afib RVR,  paroxysmal afib with subsequent Sinus Bradycardia  Afib RVR, now sinus bradycardia  - Was given atropine in ED  - Monitor electrolytes  - Telemetry      Bilateral anterolateral rib fractures  - Sec to fall during seizure episode: wife stated he fell off of the bed while agitated during seizure episode  - Pain control  - Incentive spirometry      Hypothyroidism  -Continue home levothyroxine 100 mcg daily            The patient and / or the family were informed of the results of any tests, a time was given to answer questions, a plan was proposed and they agreed with plan.     DNR-CCA    Disposition: Pending progress  PT/OT eval  Transfer from ICU      Marily Caro MD

## 2021-10-21 NOTE — ANESTHESIA PRE PROCEDURE
Department of Anesthesiology  Preprocedure Note       Name:  Joanne Amos   Age:  73 y.o.  :  1950                                          MRN:  [id number]         Date:  10/21/2021      Surgeon: Belem Meeks):  Nano Bose MD    Procedure: Procedure(s):  BRONCHOSCOPY ENDOBRONCHIAL ULTRASOUND    Medications prior to admission:   Prior to Admission medications    Not on File       Current medications:    Current Facility-Administered Medications   Medication Dose Route Frequency Provider Last Rate Last Admin    dexamethasone (DECADRON) injection 4 mg  4 mg IntraVENous Q6H Duayne Honer, APRN - NP   4 mg at 10/21/21 0507    pantoprazole (PROTONIX) injection 40 mg  40 mg IntraVENous Daily Duayne Honer, APRN - NP   40 mg at 10/21/21 0816    levETIRAcetam (KEPPRA) 1,000 mg in sodium chloride 0.9 % 100 mL IVPB  1,000 mg IntraVENous Q12H Duayne Honer, APRN - NP   Paused at 10/21/21 0506    sodium chloride flush 0.9 % injection 5-40 mL  5-40 mL IntraVENous 2 times per day Pato Do, DO   10 mL at 10/21/21 2144    sodium chloride flush 0.9 % injection 5-40 mL  5-40 mL IntraVENous PRN Pato Do, DO        0.9 % sodium chloride infusion  25 mL IntraVENous PRN Pato Do, DO        enoxaparin (LOVENOX) injection 40 mg  40 mg SubCUTAneous Daily Pato Do, DO   40 mg at 10/21/21 0815    ondansetron (ZOFRAN-ODT) disintegrating tablet 4 mg  4 mg Oral Q8H PRN Pato Do, DO        Or    ondansetron (ZOFRAN) injection 4 mg  4 mg IntraVENous Q6H PRN Pato Do, DO        polyethylene glycol (GLYCOLAX) packet 17 g  17 g Oral Daily PRN Pato Do, DO        acetaminophen (TYLENOL) tablet 650 mg  650 mg Oral Q6H PRN Pato Do, DO        Or    acetaminophen (TYLENOL) suppository 650 mg  650 mg Rectal Q6H PRN Pato Do, DO        levothyroxine (SYNTHROID) tablet 100 mcg  100 mcg Oral Daily Pato Do, DO           Allergies:  Not on File    Problem List:    Patient Active Problem List   Diagnosis Code    Intracranial mass R90.0       Past Medical History:  No past medical history on file. Past Surgical History:  No past surgical history on file. Social History:    Social History     Tobacco Use    Smoking status: Not on file   Substance Use Topics    Alcohol use: Not on file                                Counseling given: Not Answered      Vital Signs (Current):   Vitals:    10/21/21 0500 10/21/21 0600 10/21/21 0700 10/21/21 0800   BP: 125/83 134/75 130/83 136/83   Pulse: 60 66 61 62   Resp: 16 24 22 25   Temp:    97.9 °F (36.6 °C)   TempSrc:    Oral   SpO2: 95% 95% 95% 98%   Weight:                                                  BP Readings from Last 3 Encounters:   10/21/21 136/83   10/20/21 (!) 131/96       NPO Status:                                                                                 BMI:   Wt Readings from Last 3 Encounters:   10/20/21 210 lb 15.7 oz (95.7 kg)   10/20/21 210 lb (95.3 kg)     Body mass index is 27.09 kg/m². CBC:   Lab Results   Component Value Date    WBC 9.9 10/21/2021    RBC 4.68 10/21/2021    HGB 13.3 10/21/2021    HCT 40.5 10/21/2021    MCV 86.4 10/21/2021    RDW 16.4 10/21/2021     10/21/2021       CMP:   Lab Results   Component Value Date     10/21/2021    K 4.1 10/21/2021     10/21/2021    CO2 24 10/21/2021    BUN 24 10/21/2021    CREATININE 0.7 10/21/2021    GFRAA >60 10/21/2021    GFRAA >60 12/09/2010    AGRATIO 1.3 10/20/2021    LABGLOM >60 10/21/2021    GLUCOSE 131 10/21/2021    PROT 7.0 10/20/2021    CALCIUM 9.2 10/21/2021    BILITOT 0.3 10/20/2021    ALKPHOS 86 10/20/2021    AST 17 10/20/2021    ALT 20 10/20/2021       POC Tests: No results for input(s): POCGLU, POCNA, POCK, POCCL, POCBUN, POCHEMO, POCHCT in the last 72 hours.     Coags:   Lab Results   Component Value Date    PROTIME 10.8 10/21/2021    INR 0.96 10/21/2021       HCG (If Applicable): No results found for: PREGTESTUR, PREGSERUM, HCG, HCGQUANT     ABGs:   Lab Results   Component Value Date PHART 7.300 10/20/2021    PO2ART 239.0 10/20/2021    LKV4MCA 51.0 10/20/2021    WPN8VZP 25.1 10/20/2021    BEART -2.1 10/20/2021    P5YLMFTN >100.0 10/20/2021        Type & Screen (If Applicable):  No results found for: LABABO, LABRH    Drug/Infectious Status (If Applicable):  No results found for: HIV, HEPCAB    COVID-19 Screening (If Applicable): No results found for: COVID19        Anesthesia Evaluation  Patient summary reviewed no history of anesthetic complications:   Airway: Mallampati: I  TM distance: >3 FB   Neck ROM: full  Mouth opening: > = 3 FB Dental: normal exam         Pulmonary:                             ROS comment: Lung CA   Cardiovascular:    (+) dysrhythmias: atrial fibrillation,                   Neuro/Psych:   (+) seizures (seizure on admission):,              ROS comment: Multiple brain mets      Delirium  On arrival but better now   GI/Hepatic/Renal:            ROS comment: Prostate CA . Endo/Other:    (+) hypothyroidism::., .                 Abdominal:             Vascular: Other Findings:             Anesthesia Plan      general     ASA 4       Induction: intravenous. Anesthetic plan and risks discussed with patient.                       Isabel Mcguire MD   10/21/2021

## 2021-10-21 NOTE — PROCEDURES
Name: Magen Toledo   : 1950   Interpreting Physician: Missy Ford MD   Referring Physician: Kenneth Hubbard MD   Date of EEG: 10/20/2021    Clinical History:NEW ONSET SEIZURES, BRAIN METS    Current Antiepileptic Medications: Current Facility-Administered Medications: lactated ringers infusion, , IntraVENous, Continuous  dexamethasone (DECADRON) injection 4 mg, 4 mg, IntraVENous, Q6H  pantoprazole (PROTONIX) injection 40 mg, 40 mg, IntraVENous, Daily  levETIRAcetam (KEPPRA) 1,000 mg in sodium chloride 0.9 % 100 mL IVPB, 1,000 mg, IntraVENous, Q12H  sodium chloride flush 0.9 % injection 5-40 mL, 5-40 mL, IntraVENous, 2 times per day  sodium chloride flush 0.9 % injection 5-40 mL, 5-40 mL, IntraVENous, PRN  0.9 % sodium chloride infusion, 25 mL, IntraVENous, PRN  enoxaparin (LOVENOX) injection 40 mg, 40 mg, SubCUTAneous, Daily  ondansetron (ZOFRAN-ODT) disintegrating tablet 4 mg, 4 mg, Oral, Q8H PRN **OR** ondansetron (ZOFRAN) injection 4 mg, 4 mg, IntraVENous, Q6H PRN  polyethylene glycol (GLYCOLAX) packet 17 g, 17 g, Oral, Daily PRN  acetaminophen (TYLENOL) tablet 650 mg, 650 mg, Oral, Q6H PRN **OR** acetaminophen (TYLENOL) suppository 650 mg, 650 mg, Rectal, Q6H PRN  levothyroxine (SYNTHROID) tablet 100 mcg, 100 mcg, Oral, Daily         Technical Summary:  The EEG was recorded in a digital format on a patient who is reported to be awake and drowsy state during the recording. The patient was not sleep deprived prior to the EEG. The recording revealed abnormal background rhythm in the theta frequency range that was without posterior dominance. The record was remarkable for the presence of generalized slowing in the form of 5-6hz activity, mixed with periods of myogenic artifact. The record was remarkable for the absence of epileptiform discharges.  Myogenic artifact noted at the vertex    Photic stimulation was performed at various flash frequencies and without photoparoxysmal response. Hyperventilation was not performed due to medical condition. During the recording stage II sleep  was not seen. The EKG lead revealed rhythm abnormalties. EEG Interpretation:   The EEG was abnormal due to the presence of:generalized slowing, moderate severity    Clinical correlation is recommended. The absence of epileptiform discharges on a single EEG does not rule out a diagnosis of  epilepsy or rule out non-convulsive or complex partial status epilepticus as a cause of altered mental status    Generalized slowing is a non-specific finding consistent with a generalized disturbance of cerebral functioning including toxic, metabolic, or structural abnormalities that are multi-focal or diffuse.       Electronically signed by Mariann Alva MD on 10/21/2021 at 4:51 PM
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
## 2021-10-21 NOTE — PROGRESS NOTES
ICU Progress Note    Admit Date: 10/20/2021  Day: 2  Vent Day: None  IV Access:Peripheral  IV Fluids:None  Vasopressors:None                Antibiotics: None  Diet: Diet NPO Exceptions are: Sips of Water with Meds    CC: Seizure,     Interval history: No overnight events. Patient seen at bedside, comfortable with no new symptoms or concerns. Patient's mentation today is much better than yesterday. Patient had no recollection of what happened yesterday and could barely remembering going to bed the night before. I explained the events and finds on imaging and the bronchoscopy patient is planned today. Patient only reports pain in his ribs as well as his arms which are consistent with his rib fractures and agitation from yesterday. Patient is feeling well this morning. Patient denies CP, SOB, Abdominal Pain, Nausea/Vomiting, Diarrhea, Constipation, Urinary/bladder complaints, neurological deficits,       HPI: Jane Dominguez is a 71-year-old male with past medical history of hypertension, hypothyroidism, chronic peripheral effusions, left rotator cuff tendinitis who presented with supposedly seizure this morning at 5 AM.     Patient had a uneventful night and went to sleep with his wife, and she woke up to him having a suspected seizure following his left arm and drinking from cytocide. Patient states she and her son attempted to calm him down and bring him out of it but he became increasingly agitated. They then called 911, and EMS arrived patient was combative and required 10 mg of Versed and restraints. Patient reportedly urinated on self and patient was taken to [place] ED. Patient's wife states he has never had seizure in the past, or any neurological illnesses. She states that had not been complaining of anything recently or had any recent illnesses. She denies him reporting headaches, chest pain, nausea/vomiting, diarrhea/constipation, weakness, neurological symptoms/deficits.  Of note, patient had a decortication of his lungs in the past few days for \"pneumonia\" where they removed 20% of his lung. His wife also states that his lung problems began in May of this year a week after he had his second Covid shot.     In the ED, patient was found to be bradycardic with transient new onset atrial fibrillation with rapid ventricular rate. Due to his persistent bradycardia Precedex was discontinued and patient was given a dose of atropine. With a blood pressure 104/75 saturating well. CT head reported unclear suspected frontal mass with potentially other lesions. MRI brain was done that revealed multiple supratentorial and infratentorial masses with mild midline shift. Patient was started on Decadron, loaded with Keppra due to suspected seizure. Lumbar puncture was done and concern for infection or subarachnoid hemorrhage which yielded no positive results. CT chest reported a large left perihilar infrahilar mass. Initial biopsy and thoracentesis was negative for malignancy. Patient was also found to have bilateral rib fractures unclear if from seizure or fall versus possible metastatic. Patient's delirium and agitation unclear if related to CNS mass and/or postictal state. Patient had undergone agitation control with ketamine and tolerated it well in the ED.     Upon evaluation in the ICU, patient is to be admitted for further evaluation of his acute delirium, new onset seizure, brain metastasis. Upon speaking to his wife patient has a previous DNR, however she is unsure to how he feels now is that was done in 2011. She states that they will keep him DNR-CCA for now until he is of sound mind to make any CODE STATUS changes.     Medications:     Scheduled Meds:   dexamethasone  4 mg IntraVENous Q6H    pantoprazole  40 mg IntraVENous Daily    levetiracetam  1,000 mg IntraVENous Q12H    sodium chloride flush  5-40 mL IntraVENous 2 times per day    enoxaparin  40 mg SubCUTAneous Daily    levothyroxine  100 mcg Oral Daily     Continuous Infusions:   sodium chloride       PRN Meds:sodium chloride flush, sodium chloride, ondansetron **OR** ondansetron, polyethylene glycol, acetaminophen **OR** acetaminophen    Objective:   Vitals:   T-max:  Patient Vitals for the past 8 hrs:   BP Temp Temp src Pulse Resp SpO2   10/21/21 0500 125/83 -- -- 60 16 95 %   10/21/21 0400 124/84 97 °F (36.1 °C) Axillary 56 15 98 %   10/21/21 0300 117/84 -- -- 57 15 98 %   10/21/21 0200 134/76 -- -- 56 14 97 %   10/21/21 0100 132/72 -- -- 53 17 99 %   10/21/21 0000 109/70 97.2 °F (36.2 °C) Axillary 54 25 98 %   10/20/21 2300 111/84 -- -- 52 17 97 %       Intake/Output Summary (Last 24 hours) at 10/21/2021 0646  Last data filed at 10/20/2021 1800  Gross per 24 hour   Intake --   Output 425 ml   Net -425 ml       Review of Systems   Constitutional: Negative for chills, fatigue and fever. HENT: Negative. Eyes: Negative. Respiratory: Negative for cough, shortness of breath and wheezing. Cardiovascular: Negative for chest pain and leg swelling. Gastrointestinal: Negative for abdominal pain, constipation, diarrhea, nausea and vomiting. Endocrine: Negative. Genitourinary: Negative. Musculoskeletal: Positive for back pain and myalgias. Negative for arthralgias. Allergic/Immunologic: Negative. Neurological: Negative for dizziness, light-headedness, numbness and headaches. Hematological: Negative. Psychiatric/Behavioral: Negative. Physical Exam  Physical Exam  Vitals reviewed. Constitutional:       General: He is not in acute distress. Appearance: Normal appearance. He is normal weight. He is not ill-appearing or toxic-appearing. Comments: lethargic   HENT:      Head: Normocephalic and atraumatic. Nose: Nose normal.      Mouth/Throat:      Mouth: Mucous membranes are dry. Eyes:      General:         Right eye: No discharge. Left eye: No discharge.       Extraocular Movements: Extraocular movements PO2ART 239.0*   MRT3PHK 25.1   BEART -2.1   V6QCYORQ >100.0   WNW9RFW 59.8       INR:   Recent Labs     10/21/21  0451   INR 0.96     Lactate: No results for input(s): LACTATE in the last 72 hours. Cultures:  -----------------------------------------------------------------  RAD:   No orders to display         Assessment/Plan:   Lana Brown is a 73 y.o. male, who presents to the ICU with new onset seizure, acute delirium, brain lesion metastasis    New onset seizures 2/2 intracranial mass  - CTH (10/20) Low attenuating areas within the right frontal and right temporoparietal lobes likely due to vasogenic edema from malignancy rather than age-indeterminate infarcts  - MRI brain (10/20) Multiple brain parenchymal metastases in the supratentorial and  infratentorial brain. There are approximately 18 metastatic lesions scattered throughout the brain.  The largest lesion is in the right temporal lobe and measures 1.4 cm.  The 2nd largest lesion is a right frontal parietal lesion measuring 1.2 cm near the midline.  There is mild edema associated with many of the lesions without midline shift or significant mass effect.  - LP (10/20) negative, CSF studies WNL. CSF cytology pending  - continue keppra, decadron  - EEG ordered, consider continuous EEG if seizures persist  -Neurology consulted, appreciate recs  - Neurosurgery following, agree with neurochecks, Decadron, Keppra. No dextrose ivf, keep sodium within normal limits, okay for DVT prophylaxis.  -Consulted oncology, appreciate recs  -Consulted radiation oncology, appreciate recs  - seizure precautions     Acute delirium vs. Postictal state  - sp ketamine, haldol, versed in ED  - started on precedex gtt but withheld as heart rate did not tolerate     Lung mass, large left infrahilar mass with pleural effusion.  Possible primary carcinoma  - CT CAP (10/20) Large left infrahilar mass highly suspicious for primary lung carcinoma with metastatic disease suspected to the left adrenal gland and possible malignant pleural effusion.  Mass versus mucoid material is seen in the left lower bronchus focally.  Postobstructive airspace disease could represent pneumonia or atelectasis. - plan for bronch today for biopsy of lung mass for pathology     Leukocytosis 2/2 seizure vs. Infectious etiology  - WBC on admission 12  - 9.9 this AM     Empyema of left pleural space sp L open decortication August 2021   - BCx2     Afib RVR, now sinus bradycardia  - given atropine in ED  - avoid precedex     Bilateral anterolateral rib fractures - trauma vs seizure  - patient's wife states he fell off of the bed while agitated during seizure episode  - pain control     Lactic acidosis 2/2 seizure vs. infection  - trend lactic acid q6h     Hypothyroidism  -Continue home levothyroxine 100 mcg daily    Code Status: DNR-CCA  FEN: NPO for bronch  PPX: lovenox  DISPO: ICU    Irene Single, DO, PGY-1  10/21/21  6:46 AM    This patient has been staffed and discussed with Delgado Ramirez MD.    [facility]    Patient seen and examined. I agree with Dr. Iglesia Ortega history, physical, lab findings, assessment and plan. I suspect Laura Jack has a primary lung cancer with brain metastases. Laura Jack is much more lucid today and admits that he does not remember much about yesterday including my explanation of what bronchoscopy is in the risks and benefits. Therefore I repeated it today and he is amenable to proceed. His wife was at bedside. He has had no further seizures. He is on room air with an oxygen saturation of 100%.   He is n.p.o. for bronchoscopy today at noon to hopefully get an answer to what is going on    Delgado Ramirez MD

## 2021-10-21 NOTE — ANESTHESIA POSTPROCEDURE EVALUATION
Department of Anesthesiology  Postprocedure Note    Patient: Lana Brown  MRN: [id number]  YOB: 1950  Date of evaluation: 10/21/2021  Time:  1:55 PM     Procedure Summary     Date: 10/21/21 Room / Location: [facility]    Anesthesia Start: 1153 Anesthesia Stop: 1342    Procedures:       BRONCHOSCOPY ENDOBRONCHIAL ULTRASOUND (N/A )      BRONCHOSCOPY BIOPSY BRONCHUS      BRONCHOSCOPY BRUSHINGS Diagnosis: (intracranial mass with metastasis)    Surgeons: Conchita Yadav MD Responsible Provider: Tyler Tellez MD    Anesthesia Type: general ASA Status: 4          Anesthesia Type: general    Oliverio Phase I: Oliverio Score: 10    Oliverio Phase II:      Last vitals: Reviewed and per EMR flowsheets.        Anesthesia Post Evaluation    Patient location during evaluation: bedside  Patient participation: complete - patient participated  Level of consciousness: lethargic  Airway patency: patent  Nausea & Vomiting: no nausea and no vomiting  Complications: no  Cardiovascular status: hemodynamically stable  Respiratory status: acceptable  Hydration status: stable

## 2021-10-21 NOTE — PROGRESS NOTES
Speech Language Pathology  Hold x 2    Order received, chart reviewed, d/w RN who states pt NPO for procedure at this time. Will follow up as pt able and schedule allows      Andry Craft M.S./[facility]-SLP #[id number]  Pg. # [id number]        Addendum:  Pt off floor at this time for procedure. Will follow as pt available/appropriate and schedule allows      Andry Craft M.S./[facility]-SLP #[id number]  Pg.  # [phone]  1:10 PM

## 2021-10-21 NOTE — PROGRESS NOTES
Speech Language Pathology  Facility/Department: [address] [facility] ICU   CLINICAL BEDSIDE SWALLOW EVALUATION/treat    NAME: Joanne Amos  : 1950  MRN: [id number]    ADMISSION DATE: 10/20/2021  ADMITTING DIAGNOSIS: has Intracranial mass on their problem list.  ONSET DATE: 10/20/21    CT chest 10/20/21  CHEST:   Mediastinum:       1. Mass surrounds the left hilum inferiorly.  This could obscure associated   infrahilar adenopathy.  No significant mediastinal or right hilar adenopathy   is seen otherwise. 2. The heart is mildly enlarged.  There is no pericardial effusion.  Coronary   calcification is seen primarily LAD. 3. The aorta and pulmonary vessels are unremarkable for age. Lungs/pleura:       1. A large mass is seen surrounding and extending inferior to the left hilum. This measures 6.4 x 4.4 cm.  Subtle mass protrudes into the left lower lobe   bronchus, sample series 4, image 63.  This is better seen on coronal image   82.  This could potentially represent mucoid material.  In addition there is   airspace disease peripherally into the lower lobe likely some degree of   postobstructive volume loss or pneumonia.  Pleural effusion is also present   possibly malignant. 2. There is patchy bilateral airspace disease elsewhere greatest involving   the right lower lobe posteriorly.  This could be atelectatic disease however   pneumonia or aspiration is also considered. 3. No pneumothorax.  This is in spite of rib fractures as described below.    Soft Tissues/Bones: Bilateral rib fractures are present.  Some of these are   better seen on sagittal projection due to the fracture being within the axial   plane.  This involves the anterolateral right 3rd and 4th ribs as well as the   left lateral 7th rib.         MRI brain 10/20/21  Multiple brain parenchymal metastases in the supratentorial and   infratentorial brain as discussed above.  There are approximately 18   metastatic lesions scattered throughout the brain.  The largest lesion is in   the right temporal lobe and measures 1.4 cm.  The 2nd largest lesion is a   right frontal parietal lesion measuring 1.2 cm near the midline.  There is   mild edema associated with many of the lesions without midline shift or   significant mass effect.         Date of Eval: 10/21/2021  Evaluating Therapist: ANETA Mallory    Current Diet level:  Current Diet : NPO  Current Liquid Diet : NPO      Primary Complaint  Patient Complaint: pt denies any speech/cog or swallowing issues at baseline    Pain:  Pain Assessment  Pain Assessment: denies    Reason for Referral  Shoshana Felipe was referred for a bedside swallow evaluation to assess the efficiency of his swallow function, identify signs and symptoms of aspiration and make recommendations regarding safe dietary consistencies, effective compensatory strategies, and safe eating environment. HISTORY OF PRESENT ILLNESS:   Viviane Stevens is a 30-year-old male with past medical history of hypertension, hypothyroidism, chronic peripheral effusions, left rotator cuff tendinitis who presented with supposedly seizure this morning at 5 AM.  Patient had a uneventful night and went to sleep with his wife, and she woke up to him having a suspected seizure following his left arm and drinking from cytocide. Patient states she and her son attempted to calm him down and bring him out of it but he became increasingly agitated. They then called 911, and EMS arrived patient was combative and required 10 mg of Versed and restraints. Patient reportedly urinated on self and patient was taken to [place] ED. Patient's wife states he has never had seizure in the past, or any neurological illnesses. She states that had not been complaining of anything recently or had any recent illnesses. She denies him reporting headaches, chest pain, nausea/vomiting, diarrhea/constipation, weakness, neurological symptoms/deficits.  Of note, patient had a decortication of his lungs in the past few days for \"pneumonia\" where they removed 20% of his lung. His wife also states that his lung problems began in May of this year a week after he had his second Covid shot. In the ED, patient was found to be bradycardic with transient new onset atrial fibrillation with rapid ventricular rate. Due to his persistent bradycardia Precedex was discontinued and patient was given a dose of atropine. With a blood pressure 104/75 saturating well. CT head reported unclear suspected frontal mass with potentially other lesions. MRI brain was done that revealed multiple supratentorial and infratentorial masses with mild midline shift. Patient was started on Decadron, loaded with Keppra due to suspected seizure. Lumbar puncture was done and concern for infection or subarachnoid hemorrhage which yielded no positive results. CT chest reported a large left perihilar infrahilar mass. Initial biopsy and thoracentesis was negative for malignancy. Patient was also found to have bilateral rib fractures unclear if from seizure or fall versus possible metastatic. Patient's delirium and agitation unclear if related to CNS mass and/or postictal state. Patient had undergone agitation control with ketamine and tolerated it well in the ED. Upon evaluation in the ICU, patient is to be admitted for further evaluation of his acute delirium, new onset seizure, brain metastasis. Upon speaking to his wife patient has a previous DNR, however she is unsure to how he feels now is that was done in 2011. She states that they will keep him DNR-CCA for now until he is of sound mind to make any CODE STATUS changes. \"     Impression  RN states pt appropriate to see pt at this time. Pt alert, oriented, follows commands and answered questions appropriately. Oral structures grossly intact, no asymmetry noted. Pt able to cough and swallow on command, vocal quality strong and clear.   Pt denies any speech or swallowing issues prior to admit. Speech is clear, no word finding/dysarthria noted, cognition appears grossly intact. Pt presented pt with puree, thin liquids via cup / straw, including 3 ounces of uninterrupted swallows, as well as a rosie cracker. Pt demonstrated no overt signs of aspiration: no coughing/throat clearing or change in vocal quality. Good labial seal with no anterior loss of liquids. Good swallow movement noted upon palpation of anterior neck. Pt exhibited no difficulty with mastication of cracker, no oral residue. No c/o globus sensation  After assessment, pt noted to exhibit coughing with expectoration of secretions. Pt reports this has been occurring since his lung surgery in August.  He states it occurs throughout the day, not associated with eating. Recommend regular diet/thin liquids with close monitoring for s/s of aspiration and will monitor need for instrumental exam    Dysphagia Diagnosis: Swallow function appears grossly intact  Dysphagia Outcome Severity Scale: Level 6: Within functional limits/Modified independence     Treatment Plan  Requires SLP Intervention: Yes  Duration/Frequency of Treatment: 1-2 x  D/C Recommendations: To be determined     Recommended Diet and Intervention  Diet Solids Recommendation: Regular  Liquid Consistency Recommendation:  Thin  Therapeutic Interventions: Oral care;Diet tolerance monitoring;Patient/Family education    Compensatory Swallowing Strategies  Compensatory Swallowing Strategies: Upright as possible for all oral intake;Small bites/sips    Treatment/Goals  Dysphagia Goals:   1-The patient will tolerate recommended diet without observed clinical signs of aspiration    2-The patient/caregiver will demonstrate understanding of compensatory strategies for improved swallowing safety  10/21: Educated pt and spouse to purpose of visit, s/s of aspiration, concern if aspiration occurs, rationale for diet recommendation/strategies to reduce risk for aspiration and instruction to notify staff if any signs emerge. Both stated comprehension and agreeable  Cont goal    General  Chart Reviewed: Yes  Behavior/Cognition: Alert; Cooperative;Pleasant mood  Respiratory Status: O2 via nasual cannula  Communication Observation: Functional  Follows Directions: Complex  Dentition: Adequate  Patient Positioning: Upright in bed  Baseline Vocal Quality: Normal  Volitional Cough: Strong  Prior Dysphagia History: none  Consistencies Administered: Reg solid; Dysphagia Pureed (Dysphagia I); Thin - teaspoon; Thin - cup; Thin - straw; Ice Chips    Vision/Hearing  Vision  Vision: Within Functional Limits  Hearing  Hearing: Within functional limits    Oral Motor Deficits  Oral/Motor  Oral Motor: Within functional limits    Oral Phase Dysfunction  Good labial seal with no anterior loss of liquids. Pt exhibited no difficulty with mastication of cracker, no oral residue. No c/o globus sensation     Indicators of Pharyngeal Phase Dysfunction  Pt presented pt with puree, thin liquids via cup / straw, including 3 ounces of uninterrupted swallows, as well as a rosie cracker. Pt demonstrated no overt signs of aspiration: no coughing/throat clearing or change in vocal quality. Good swallow movement noted upon palpation of anterior neck. After assessment, pt noted to exhibit coughing with expectoration of secretions.      Prognosis  Prognosis  Prognosis for safe diet advancement: fair (nature of deficits)  Individuals consulted  Consulted and agree with results and recommendations: Patient;RN    Education  Patient Education: Pt /spouse educated to purpose of visit  Patient Education Response: Verbalizes understanding  Safety Devices in place: Yes  Type of devices: Call light within reach       Therapy Time  SLP Individual Minutes  Time In: 0310  Time Out: 0335  Minutes: 25     Plan:  Recommended diet: regular /thin liquids -if any s/s of aspiration emerge, or there is respiratory decline,  make NPO until further evaluated by SLP  Dc recommendation: cog/linguistic eval   Pt therapy goal: denies any problems  Pt dc goal: I want to eat  Lana Lemos M.S./[facility]-SLP #[id number]  Pg.  # [phone]  Needs met prior to leaving room, call light within reach, d/w TALI Mackay  This document will serve as a dc summary if pt dc prior to next visit  10/21/2021 3:39 PM

## 2021-10-21 NOTE — PROGRESS NOTES
Physical Therapy/Occupational Therapy  HOLD  Pt off floor for bronch at this time. Pt is also on strict bedrest.  Will need increased activity orders when appropriate for PT/OT & OOB. Will check back 10/22.     Renetta Layne, PT [id number]  Kiara Hudson  MS, OTR/L #[id number]

## 2021-10-22 LAB
ANION GAP SERPL CALCULATED.3IONS-SCNC: 12 MMOL/L (ref 3–16)
BUN BLDV-MCNC: 26 MG/DL (ref 7–20)
CALCIUM SERPL-MCNC: 8.8 MG/DL (ref 8.3–10.6)
CHLORIDE BLD-SCNC: 104 MMOL/L (ref 99–110)
CO2: 24 MMOL/L (ref 21–32)
CREAT SERPL-MCNC: 0.8 MG/DL (ref 0.8–1.3)
GFR AFRICAN AMERICAN: >60
GFR NON-AFRICAN AMERICAN: >60
GLUCOSE BLD-MCNC: 151 MG/DL (ref 70–99)
HCT VFR BLD CALC: 40.5 % (ref 40.5–52.5)
HEMOGLOBIN: 13.3 G/DL (ref 13.5–17.5)
LACTATE DEHYDROGENASE: 340 U/L (ref 100–190)
LACTIC ACID: 1.1 MMOL/L (ref 0.4–2)
LACTIC ACID: 1.3 MMOL/L (ref 0.4–2)
MCH RBC QN AUTO: 28.2 PG (ref 26–34)
MCHC RBC AUTO-ENTMCNC: 32.7 G/DL (ref 31–36)
MCV RBC AUTO: 86.3 FL (ref 80–100)
PDW BLD-RTO: 16.7 % (ref 12.4–15.4)
PHOSPHORUS: 3.1 MG/DL (ref 2.5–4.9)
PLATELET # BLD: 234 K/UL (ref 135–450)
PMV BLD AUTO: 9.1 FL (ref 5–10.5)
POTASSIUM REFLEX MAGNESIUM: 4.3 MMOL/L (ref 3.5–5.1)
RBC # BLD: 4.69 M/UL (ref 4.2–5.9)
SODIUM BLD-SCNC: 140 MMOL/L (ref 136–145)
URIC ACID, SERUM: 4.2 MG/DL (ref 3.5–7.2)
WBC # BLD: 13 K/UL (ref 4–11)

## 2021-10-22 PROCEDURE — 97535 SELF CARE MNGMENT TRAINING: CPT

## 2021-10-22 PROCEDURE — 97162 PT EVAL MOD COMPLEX 30 MIN: CPT

## 2021-10-22 PROCEDURE — 6360000002 HC RX W HCPCS: Performed by: INTERNAL MEDICINE

## 2021-10-22 PROCEDURE — 97530 THERAPEUTIC ACTIVITIES: CPT

## 2021-10-22 PROCEDURE — 6360000002 HC RX W HCPCS: Performed by: STUDENT IN AN ORGANIZED HEALTH CARE EDUCATION/TRAINING PROGRAM

## 2021-10-22 PROCEDURE — 99232 SBSQ HOSP IP/OBS MODERATE 35: CPT | Performed by: INTERNAL MEDICINE

## 2021-10-22 PROCEDURE — 83615 LACTATE (LD) (LDH) ENZYME: CPT

## 2021-10-22 PROCEDURE — 6370000000 HC RX 637 (ALT 250 FOR IP): Performed by: INTERNAL MEDICINE

## 2021-10-22 PROCEDURE — 77295 3-D RADIOTHERAPY PLAN: CPT

## 2021-10-22 PROCEDURE — 6360000002 HC RX W HCPCS: Performed by: NURSE PRACTITIONER

## 2021-10-22 PROCEDURE — 2580000003 HC RX 258: Performed by: STUDENT IN AN ORGANIZED HEALTH CARE EDUCATION/TRAINING PROGRAM

## 2021-10-22 PROCEDURE — 2580000003 HC RX 258: Performed by: NURSE PRACTITIONER

## 2021-10-22 PROCEDURE — 97166 OT EVAL MOD COMPLEX 45 MIN: CPT

## 2021-10-22 PROCEDURE — 77300 RADIATION THERAPY DOSE PLAN: CPT

## 2021-10-22 PROCEDURE — 2580000003 HC RX 258: Performed by: INTERNAL MEDICINE

## 2021-10-22 PROCEDURE — 6370000000 HC RX 637 (ALT 250 FOR IP): Performed by: STUDENT IN AN ORGANIZED HEALTH CARE EDUCATION/TRAINING PROGRAM

## 2021-10-22 PROCEDURE — 85027 COMPLETE CBC AUTOMATED: CPT

## 2021-10-22 PROCEDURE — 84100 ASSAY OF PHOSPHORUS: CPT

## 2021-10-22 PROCEDURE — 36415 COLL VENOUS BLD VENIPUNCTURE: CPT

## 2021-10-22 PROCEDURE — 97116 GAIT TRAINING THERAPY: CPT

## 2021-10-22 PROCEDURE — 2060000000 HC ICU INTERMEDIATE R&B

## 2021-10-22 PROCEDURE — 99232 SBSQ HOSP IP/OBS MODERATE 35: CPT | Performed by: NURSE PRACTITIONER

## 2021-10-22 PROCEDURE — 92526 ORAL FUNCTION THERAPY: CPT

## 2021-10-22 PROCEDURE — 80048 BASIC METABOLIC PNL TOTAL CA: CPT

## 2021-10-22 PROCEDURE — 77334 RADIATION TREATMENT AID(S): CPT

## 2021-10-22 PROCEDURE — C9113 INJ PANTOPRAZOLE SODIUM, VIA: HCPCS | Performed by: NURSE PRACTITIONER

## 2021-10-22 PROCEDURE — 84550 ASSAY OF BLOOD/URIC ACID: CPT

## 2021-10-22 PROCEDURE — 83605 ASSAY OF LACTIC ACID: CPT

## 2021-10-22 RX ORDER — ALLOPURINOL 300 MG/1
300 TABLET ORAL DAILY
Status: DISCONTINUED | OUTPATIENT
Start: 2021-10-22 | End: 2021-10-26 | Stop reason: HOSPADM

## 2021-10-22 RX ORDER — SODIUM CHLORIDE 9 MG/ML
INJECTION, SOLUTION INTRAVENOUS CONTINUOUS
Status: DISCONTINUED | OUTPATIENT
Start: 2021-10-22 | End: 2021-10-22

## 2021-10-22 RX ORDER — SODIUM CHLORIDE 9 MG/ML
INJECTION, SOLUTION INTRAVENOUS CONTINUOUS
Status: DISCONTINUED | OUTPATIENT
Start: 2021-10-22 | End: 2021-10-26 | Stop reason: HOSPADM

## 2021-10-22 RX ORDER — ONDANSETRON 2 MG/ML
4 INJECTION INTRAMUSCULAR; INTRAVENOUS EVERY 6 HOURS PRN
Status: DISCONTINUED | OUTPATIENT
Start: 2021-10-24 | End: 2021-10-26 | Stop reason: HOSPADM

## 2021-10-22 RX ORDER — ONDANSETRON 4 MG/1
4 TABLET, ORALLY DISINTEGRATING ORAL EVERY 8 HOURS PRN
Status: DISCONTINUED | OUTPATIENT
Start: 2021-10-24 | End: 2021-10-26 | Stop reason: HOSPADM

## 2021-10-22 RX ORDER — ONDANSETRON HYDROCHLORIDE 8 MG/1
24 TABLET, FILM COATED ORAL ONCE
Status: COMPLETED | OUTPATIENT
Start: 2021-10-23 | End: 2021-10-23

## 2021-10-22 RX ORDER — PANTOPRAZOLE SODIUM 40 MG/1
40 TABLET, DELAYED RELEASE ORAL
Status: DISCONTINUED | OUTPATIENT
Start: 2021-10-23 | End: 2021-10-26 | Stop reason: HOSPADM

## 2021-10-22 RX ORDER — DEXAMETHASONE 4 MG/1
4 TABLET ORAL EVERY 6 HOURS SCHEDULED
Status: DISCONTINUED | OUTPATIENT
Start: 2021-10-22 | End: 2021-10-26 | Stop reason: HOSPADM

## 2021-10-22 RX ORDER — LEVETIRACETAM 500 MG/1
1000 TABLET ORAL 2 TIMES DAILY
Status: DISCONTINUED | OUTPATIENT
Start: 2021-10-22 | End: 2021-10-26 | Stop reason: HOSPADM

## 2021-10-22 RX ADMIN — ENOXAPARIN SODIUM 40 MG: 100 INJECTION SUBCUTANEOUS at 08:11

## 2021-10-22 RX ADMIN — SODIUM CHLORIDE: 9 INJECTION, SOLUTION INTRAVENOUS at 17:00

## 2021-10-22 RX ADMIN — DEXAMETHASONE SODIUM PHOSPHATE 4 MG: 4 INJECTION, SOLUTION INTRAMUSCULAR; INTRAVENOUS at 04:20

## 2021-10-22 RX ADMIN — DEXAMETHASONE SODIUM PHOSPHATE 4 MG: 4 INJECTION, SOLUTION INTRAMUSCULAR; INTRAVENOUS at 16:24

## 2021-10-22 RX ADMIN — DEXAMETHASONE SODIUM PHOSPHATE 4 MG: 4 INJECTION, SOLUTION INTRAMUSCULAR; INTRAVENOUS at 11:04

## 2021-10-22 RX ADMIN — LEVOTHYROXINE SODIUM 100 MCG: 0.1 TABLET ORAL at 06:56

## 2021-10-22 RX ADMIN — SODIUM CHLORIDE, PRESERVATIVE FREE 10 ML: 5 INJECTION INTRAVENOUS at 08:12

## 2021-10-22 RX ADMIN — ALLOPURINOL 300 MG: 300 TABLET ORAL at 14:08

## 2021-10-22 RX ADMIN — SODIUM CHLORIDE, PRESERVATIVE FREE 10 ML: 5 INJECTION INTRAVENOUS at 20:59

## 2021-10-22 RX ADMIN — LEVETIRACETAM 1000 MG: 500 TABLET, FILM COATED ORAL at 20:59

## 2021-10-22 RX ADMIN — SODIUM CHLORIDE 25 ML: 9 INJECTION, SOLUTION INTRAVENOUS at 04:24

## 2021-10-22 RX ADMIN — PANTOPRAZOLE SODIUM 40 MG: 40 INJECTION, POWDER, FOR SOLUTION INTRAVENOUS at 08:11

## 2021-10-22 RX ADMIN — DEXAMETHASONE 4 MG: 4 TABLET ORAL at 23:28

## 2021-10-22 RX ADMIN — SODIUM CHLORIDE: 9 INJECTION, SOLUTION INTRAVENOUS at 14:09

## 2021-10-22 RX ADMIN — LEVETIRACETAM 1000 MG: 100 INJECTION, SOLUTION INTRAVENOUS at 04:27

## 2021-10-22 RX ADMIN — LEVETIRACETAM 1000 MG: 100 INJECTION, SOLUTION INTRAVENOUS at 16:27

## 2021-10-22 RX ADMIN — ACETAMINOPHEN 650 MG: 325 TABLET ORAL at 11:20

## 2021-10-22 ASSESSMENT — PAIN SCALES - GENERAL
PAINLEVEL_OUTOF10: 0
PAINLEVEL_OUTOF10: 2
PAINLEVEL_OUTOF10: 3
PAINLEVEL_OUTOF10: 0
PAINLEVEL_OUTOF10: 4
PAINLEVEL_OUTOF10: 0

## 2021-10-22 ASSESSMENT — ENCOUNTER SYMPTOMS
EYES NEGATIVE: 1
VOMITING: 0
NAUSEA: 0
ALLERGIC/IMMUNOLOGIC NEGATIVE: 1
SHORTNESS OF BREATH: 0
GASTROINTESTINAL NEGATIVE: 1
CONSTIPATION: 0
DIARRHEA: 0
ABDOMINAL PAIN: 0
COUGH: 0
WHEEZING: 0
COUGH: 1
BACK PAIN: 1

## 2021-10-22 ASSESSMENT — PAIN DESCRIPTION - LOCATION
LOCATION: SHOULDER
LOCATION: SHOULDER

## 2021-10-22 ASSESSMENT — PAIN DESCRIPTION - FREQUENCY: FREQUENCY: INTERMITTENT

## 2021-10-22 ASSESSMENT — PAIN - FUNCTIONAL ASSESSMENT: PAIN_FUNCTIONAL_ASSESSMENT: PREVENTS OR INTERFERES SOME ACTIVE ACTIVITIES AND ADLS

## 2021-10-22 ASSESSMENT — PAIN DESCRIPTION - DESCRIPTORS: DESCRIPTORS: STABBING

## 2021-10-22 ASSESSMENT — PAIN DESCRIPTION - ORIENTATION: ORIENTATION: LEFT

## 2021-10-22 ASSESSMENT — PAIN DESCRIPTION - PAIN TYPE: TYPE: CHRONIC PAIN

## 2021-10-22 ASSESSMENT — PAIN DESCRIPTION - PROGRESSION: CLINICAL_PROGRESSION: NOT CHANGED

## 2021-10-22 ASSESSMENT — PAIN DESCRIPTION - ONSET: ONSET: PROGRESSIVE

## 2021-10-22 NOTE — PROGRESS NOTES
Neurology Progress Note    Reason for visit: seizure    Interval history   1) S/p bronchoscopy, awaiting pathology  2) Continues to undergo malignancy evaluation  3) No further seizures    Exam:  -Mental status: A&O x3; pleasant & appropriate  -Speech & Language: no aphasia; no dysarthria  -Cranial nerves: pupils symmetric; no notable dysconjugate gaze; eyes midline; no facial asymmetry  -Motor: moving all extremities symmetrically and fully  -Other: no adventitious movements noted  Other Systems  -General Appearance: well-developed, well-nourished, no apparent distress  -Neck: supple  -Lungs: breathing unlabored, regular, no audible wheezes  -CV: pulses strong x4 extremities  -Abd: flat    Neurological ROS: no weakness, no aphasia, no sensory changes    Labs: Tox + cannabinoids    CSF  Protein 50  Glucose 90  WBC 0  RBC 0  Culture NGTD    Studies:  EEG: generalized slowing    MRI brain w/wo shiela 10/20/21 independently reviewed  Multiple brain parenchymal metastases in the supratentorial and   infratentorial brain as discussed above.  There are approximately 18   metastatic lesions scattered throughout the brain.  The largest lesion is in   the right temporal lobe and measures 1.4 cm.  The 2nd largest lesion is a   right frontal parietal lesion measuring 1.2 cm near the midline.  There is   mild edema associated with many of the lesions without midline shift or   significant mass effect. Impression:  Agustina Jane is a 73 y.o. male with suspected lung cancer and multiple brain lesions consistent with metastatic disease who presents with first ever generalized seizure. CSF reassuring against infection. He is back to baseline neurologically and doing well today. Recommendations:  - No driving 3 months seizure free. We discussed this and he expressed understanding.  - Continue Keppra. Ok to switch to PO any time. - F/u With neurology as outpatient     Will sign off.  Call with questions, concerns, or change in exam.     A copy of this note was provided for Dr Lien Laguerre MD.     I spent 25 minutes in the care of this patient. Over 50% of that time was in face-to-face counseling regarding disease process, diagnostic testing, preventative measures, and answering patient and family questions.      Ankit Gandhi NP  [facility] Neurology line: [phone]  [employer]: [facility]rology [facility] Neuro[facility] NPs

## 2021-10-22 NOTE — PROGRESS NOTES
Progress Note  Admit Date: 10/20/2021           73 y.o. male w/ PMH of HTN, prostate CA, hypothyroidism, left rotator cuff tendinitis, empyema of left lung who presented to [facility] on 10/20/2021 with confusion and seizure. Was admitted to the ICU. Interval History:   No  new symptoms     Awake and alert, sitting out of bed, wife at bedside  He has no recollection of what happened PTA    No chest pain  No overt seizures  No fevers      Review of Systems - Negative except as in HPI. Scheduled Medications:    allopurinol  300 mg Oral Daily    dexamethasone  4 mg IntraVENous Q6H    pantoprazole  40 mg IntraVENous Daily    levetiracetam  1,000 mg IntraVENous Q12H    sodium chloride flush  5-40 mL IntraVENous 2 times per day    enoxaparin  40 mg SubCUTAneous Daily    levothyroxine  100 mcg Oral Daily      PRN Medications: sodium chloride flush, sodium chloride, ondansetron **OR** ondansetron, polyethylene glycol, acetaminophen **OR** acetaminophen  Diet: Diet NPO Exceptions are: Sips of Water with Meds    Continuous Infusions:   sodium chloride 100 mL/hr at 10/22/21 1409    lactated ringers Stopped (10/21/21 1325)    sodium chloride 10 mL/hr at 10/22/21 0442       PHYSICAL EXAM:  /85   Pulse 68   Temp 98.4 °F (36.9 °C) (Oral)   Resp 18   Wt 210 lb 15.7 oz (95.7 kg)   SpO2 95%   BMI 27.09 kg/m²   No results for input(s): POCGLU in the last 72 hours.     Intake/Output Summary (Last 24 hours) at 10/22/2021 1620  Last data filed at 10/22/2021 2968  Gross per 24 hour   Intake 401.53 ml   Output 895 ml   Net -493.47 ml       General appearance: alert, appears stated age and cooperative  Head: Normocephalic, without obvious abnormality, atraumatic  Neck: no adenopathy, no carotid bruit, no JVD, supple, symmetrical, trachea midline and thyroid not enlarged, symmetric, no tenderness/mass/nodules  Lungs: clear to auscultation bilaterally  Heart: regular rate and rhythm, S1, S2 normal, no murmur, click, rub or gallop  Abdomen: soft, non-tender; bowel sounds normal; no masses,  no organomegaly  Extremities: extremities normal, atraumatic, no cyanosis or edema  Pulses: 2+ and symmetric  Neurologic: Grossly normal      LABS:  Recent Labs     10/20/21  0614 10/21/21  0451 10/22/21  0418   WBC 12.0* 9.9 13.0*   HGB 13.9 13.3* 13.3*   HCT 42.2 40.5 40.5    209 234                                                                    Recent Labs     10/20/21  0614 10/21/21  0451 10/22/21  0418    140 140   K 4.5 4.1 4.3    104 104   CO2 19* 24 24   BUN 24* 24* 26*   CREATININE 1.0 0.7* 0.8   GLUCOSE 185* 131* 151*     Recent Labs     10/20/21  0614   AST 17   ALT 20   BILITOT 0.3   ALKPHOS 86     Recent Labs     10/20/21  0614   TROPONINI <0.01     No results for input(s): BNP in the last 72 hours. No results for input(s): CHOL, HDL in the last 72 hours. Invalid input(s): LDLCALCU  Recent Labs     10/21/21  0451   INR 0.96       Assessment & Plan:    73 y.o. male transferred from [facility] on 10/20/2021 with confusion and seizure. Intracranial mass, Non traumatic with vasogenic edema  - Presented to the ED at [facility] with mental status changes.  - Noted with encephalopathy and probable seizures from brain mass with vasogenic edema  - Transferred here for further mgt  - Continue Keppra  - Continue Decadron  - NSGY evaluated: No emergent neurosurgical intervention indicated, given size and number of brain metastases recommend treatment of brain lesions with radiation therapy   - Seen by Rad/onc:  Whole brain radiation therapy planned, to start once his pathology is back confirming malignancy.   Planned for [facility], [place] office as he lives in [facility]      Seizures  Lactic acidosis  -Seizure secondary to multiple intracranial metastases  - Lactic acidosis likely sec to seizures  - No further seizures  - Neurology following  - Continue Decadron and Keppra       Acute metabolic encephalopathy secondary to seizures, brain mass with cerebral edema  -Continue Decadron and Keppra  -Appears mental status change resolved. Lung mass, large left infrahilar mass   Pleural effusion, possibly malignant effusion  Left adrenal mass  Multiple intracranial masses  - Probably metastatic disease, probably lung primary  - S/p Bronchoscopy with biopsy- Pathology awaited to guide therapy  - Oncology planning in pxt chemo if diagnosis shows SCLC: He would need a port for this treatment. Recent txt for Empyema of left pleural space sp L open decortication August 2021  Right LL airspace disease  - Initial concern for infection, post-obstructive pneumonia: ruled out  - WBC 12 on admission, Now WNL without antibx  - On room air with an oxygen saturation of 100%  - Blood cultures: NGTD  - Monitor vitals and labs      Afib RVR,  paroxysmal afib with subsequent Sinus Bradycardia  Afib RVR, then sinus bradycardia  - Was given atropine in ED  - NSR now  - Monitor electrolytes  - Telemetry      Bilateral anterolateral rib fractures  - Sec to fall during seizure episode: wife stated he fell off of the bed while agitated during seizure episode  - Pain control  - Incentive spirometry      Hypothyroidism  -Continue home levothyroxine 100 mcg daily            The patient and / or the family were informed of the results of any tests, a time was given to answer questions, a plan was proposed and they agreed with plan.     DNR-CCA    Disposition:   Transferred from ICU 10/21/2021  Pending plans per onc with Path report  Remove Cano  PT/OT lazarus Doran MD

## 2021-10-22 NOTE — PROGRESS NOTES
Speech Language Pathology  Facility/Department: [address]  Dysphagia Daily Treatment Note    NAME: Kumar De La Cruz  : 1950  MRN: [id number]    Patient Diagnosis(es):   Patient Active Problem List    Diagnosis Date Noted    Lesion of lung 10/21/2021    First time seizure ([address] 10/21/2021    Hypothyroid     Intracranial mass 10/20/2021     Allergies: No Known Allergies  Onset Date: 10/20/2021    CT chest 10/20/21  CHEST:   Mediastinum:       1. Mass surrounds the left hilum inferiorly.  This could obscure associated   infrahilar adenopathy.  No significant mediastinal or right hilar adenopathy   is seen otherwise. 2. The heart is mildly enlarged.  There is no pericardial effusion.  Coronary   calcification is seen primarily LAD. 3. The aorta and pulmonary vessels are unremarkable for age. Lungs/pleura:       1. A large mass is seen surrounding and extending inferior to the left hilum. This measures 6.4 x 4.4 cm.  Subtle mass protrudes into the left lower lobe   bronchus, sample series 4, image 63.  This is better seen on coronal image   82.  This could potentially represent mucoid material.  In addition there is   airspace disease peripherally into the lower lobe likely some degree of   postobstructive volume loss or pneumonia.  Pleural effusion is also present   possibly malignant. 2. There is patchy bilateral airspace disease elsewhere greatest involving   the right lower lobe posteriorly.  This could be atelectatic disease however   pneumonia or aspiration is also considered. 3. No pneumothorax.  This is in spite of rib fractures as described below.    Soft Tissues/Bones: Bilateral rib fractures are present.  Some of these are   better seen on sagittal projection due to the fracture being within the axial   plane.  This involves the anterolateral right 3rd and 4th ribs as well as the   left lateral 7th rib.          MRI brain 10/20/21  Multiple brain parenchymal metastases in the supratentorial and   infratentorial brain as discussed above.  There are approximately 18   metastatic lesions scattered throughout the brain.  The largest lesion is in   the right temporal lobe and measures 1.4 cm.  The 2nd largest lesion is a   right frontal parietal lesion measuring 1.2 cm near the midline.  There is   mild edema associated with many of the lesions without midline shift or   significant mass effect.         Previous MBS (date)    Chart reviewed. Medical Diagnosis: Intracranial mass  Treatment Diagnosis: Dysphagia    BSE Impression (10/21/2021)-  RN states pt appropriate to see pt at this time. Pt alert, oriented, follows commands and answered questions appropriately. Oral structures grossly intact, no asymmetry noted. Pt able to cough and swallow on command, vocal quality strong and clear. Pt denies any speech or swallowing issues prior to admit. Speech is clear, no word finding/dysarthria noted, cognition appears grossly intact. Pt presented pt with puree, thin liquids via cup / straw, including 3 ounces of uninterrupted swallows, as well as a rosie cracker. Pt demonstrated no overt signs of aspiration: no coughing/throat clearing or change in vocal quality. Good labial seal with no anterior loss of liquids. Good swallow movement noted upon palpation of anterior neck. Pt exhibited no difficulty with mastication of cracker, no oral residue. No c/o globus sensation  After assessment, pt noted to exhibit coughing with expectoration of secretions. Pt reports this has been occurring since his lung surgery in August.  He states it occurs throughout the day, not associated with eating.    Recommend regular diet/thin liquids with close monitoring for s/s of aspiration and will monitor need for instrumental exam    MBS results (n/a)-    Pain: 4/10 shoulder pain, but declined intervention    Current Diet : NPO, though recommended regular solids/thin liquids    Treatment:  Pt treatment, this note will serve as the discharge summary

## 2021-10-22 NOTE — PROGRESS NOTES
Physical Therapy    Facility/Department: [address] [facility]  Initial Assessment and Discharge    NAME: Melquiades Cantu  : 1950  MRN: [id number]    Date of Service: 10/22/2021    Discharge Recommendations:  Melquiades Cantu scored a 24/24 on the AM-PAC short mobility form. At this time, no further PT is recommended upon discharge. Recommend patient returns to prior setting with prior services. PT Equipment Recommendations  Equipment Needed: No    Assessment   Assessment: Pt from home with seizure and intracranial mass. Pt doing well from PT standpoint. No neuro deficits noted on PT exam.  Gait is steady. No further PT needs indicated. Recommend continued activity/ambulation per RN staff for remainder of hospital stay. Pt verbalized understanding. Will sign off from acute PT services. Decision Making: Medium Complexity  Patient Education: Role of PT and activity promotion with RN staff. Pt verbalized understanding. REQUIRES PT FOLLOW UP: No       Restrictions  Up with assist     Vision/Hearing  Vision: Within Functional Limits (readers)  Hearing: Within functional limits       Subjective  Chart Reviewed: Yes  Additional Pertinent Hx: Pt to [place] ED 10/20 with confusion and siezure. Imaging (+) for brain mass with vasogenic edema. Transferred to ICU at [facility] for neuro management. Pt transferred to [facility] 10/21. Oncology following. PMH:  OA, HTN, PNA, seizure  Diagnosis: ICH    Subjective  Subjective: Pt found sitting up in chair with wife in room. Pt ready for PT evaluation. \"This feels good moving. \"  Pt with c/o R sided rib pain, not rated and RN is aware.   Pain Screening  Patient Currently in Pain: Yes    Orientation  Within Functional Limits    Social/Functional History  Lives With: Spouse  Type of Home: House  Home Layout: Laundry in basement, Two level, Able to Live on Main level with bedroom/bathroom  Home Access: Stairs to enter with rails  Entrance Stairs - Number of Steps: 11  Bathroom Shower/Tub: Tub/Shower unit  Bathroom Toilet: Standard (sink for leverage)  Bathroom Equipment: Grab bars in shower  ADL Assistance: [address]: Independent (shares with spouse)  Ambulation Assistance: Independent  Transfer Assistance: Independent  Active : Yes  Occupation: Full time employment  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 [employer]      Objective  Strength  Strength RLE: [facility]  Strength LLE: WFL    Transfers  Sit to Stand: Modified independent  Stand to sit: Modified independent     Ambulation  Device: No Device  Assistance: Independent  Quality of Gait: steady gait  Distance: 350ft    Balance  Sitting - Static: Good  Sitting - Dynamic: Good  Standing - Static: Good  Standing - Dynamic: Good    Treatment included gait and transfer training with patient education     Plan: Discharge acute PT      Safety Devices  Type of devices: Left in chair, Call light within reach, Nurse notified (wife in room, RN ok without chair alarm)      AM-PAC Score  AM-PAC Inpatient Mobility Raw Score : 24 (10/22/21 1132)  AM-PAC Inpatient T-Scale Score : 61.14 (10/22/21 1132)  Mobility Inpatient CMS 0-100% Score: 0 (10/22/21 1132)  Mobility Inpatient CMS G-Code Modifier : [address] (10/22/21 1132)      Therapy Time   Individual Concurrent Group Co-treatment   Time In 1030         Time Out 1108         Minutes 38         Timed Code Treatment Minutes:  25  Total Treatment Minutes:  [address] [id number], PT

## 2021-10-22 NOTE — PROGRESS NOTES
position/activity restrictions: Up with assist    Subjective   General  Chart Reviewed: Yes  Additional Pertinent Hx: Admit 10/20 with Intracranial Mass   Multiple CT scans,  MRI head -Multiple brain parenchymal metastases x 18 ,  Neuro sx consult- no sx,    Bronch- 10/21,                                            PMHX:OA, HTN, PNA, Seizures, prostate CA, hypothyroidism, left rotator cuff tendinitis, empyema of left lung  Family / Caregiver Present: Yes (wife)  Diagnosis: Brain mass  Subjective  Subjective: \" I feel pretty good \" pt in chair - up with wife. Pt agreeable for OOB/OT eval and tx/  Patient Currently in Pain: Yes -shoulder / rib pain -- not rated -- RN aware. Social/Functional History  Social/Functional History  Lives With: Spouse  Type of Home: House  Home Layout: Laundry in basement, Two level, Able to Live on Main level with bedroom/bathroom  Home Access: Stairs to enter with rails  Entrance Stairs - Number of Steps: 11  Bathroom Shower/Tub: Tub/Shower unit  Bathroom Toilet: Standard (sink for leverage)  Bathroom Equipment: Grab bars in shower  ADL Assistance: [address] [place] [address]: Independent (shares with spouse)  Ambulation Assistance: Independent  Transfer Assistance: Independent  Active : Yes  Occupation: Full time employment  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 education. Vision: Within Functional Limits  Hearing: Within functional limits    Orientation  Overall Orientation Status: Within Functional Limits     Balance  Sitting Balance: Independent  Standing Balance: Independent  Standing Balance  Time: 4 mins x 1, 3  mins x 1 and 7 mins x 1  Activity: functional transfers /stance / mobility in room  / hallway / bathroom  Functional Mobility  Functional - Mobility Device: No device  Activity: Other; To/from bathroom  Assist Level:  Independent  Functional Mobility Comments: No LOB noted  Toilet Transfers  Toilet - Technique: Ambulating  Equipment Used: Standard toilet  Toilet Transfer: Modified independent  Toilet Transfers Comments: has sink for leverage at home -- using rail  ADL  Feeding: Independent  Grooming: Setup (stance at sink for brushing teeth/ washing face.)  LE Dressing: Modified independent  (donned socks sitting up in chair. Pt edu on sitting for safety with LE ADLs - verb understanding)  Toileting: Modified independent  (with rail for leverage / support. Pt demo independence with pericare - denies need)  Tone RUE  RUE Tone: Normotonic  Tone LUE  LUE Tone: Normotonic  Coordination  Movements Are Fluid And Coordinated: Yes     Bed mobility  Comment: pt found up in bed side chair with wife this am.  Transfers  Sit to stand: Independent  Stand to sit:  Independent  Vision - Basic Assessment  Prior Vision: Wears glasses only for reading  Cognition  Overall Cognitive Status: WFL    LUE AROM (degrees)  LUE AROM : Exceptions  LUE General AROM: limited shoulder flex/ abd 2/2 chronic shoulder issues,  elbow / wrist - WFL  Left Hand AROM (degrees)  Left Hand AROM: WFL  RUE AROM (degrees)  RUE AROM : WFL  Right Hand AROM (degrees)  Right Hand AROM: WFL  LUE Strength  Gross LUE Strength: WFL  LUE Strength Comment: except shoulder - not tested  RUE Strength  Gross RUE Strength: WFL     Plan D/c Acute OT services           AM-PAC Score  AM-[facility] Inpatient Daily Activity Raw Score: 23 (10/22/21 1155)  AM-PAC Inpatient ADL T-Scale Score : 51.12 (10/22/21 1155)  ADL Inpatient CMS 0-100% Score: 15.86 (10/22/21 1155)  ADL Inpatient CMS G-Code Modifier : CI (10/22/21 1155)    Therapy Time   Individual Concurrent Group Co-treatment   Time In 1029         Time Out 1108         Minutes 39            Timed Code Treatment Minutes:   23 mins     Total Treatment Minutes:  39 mins       Sherwin Ba OT

## 2021-10-22 NOTE — CARE COORDINATION
3:27 PM  Possible new lung cancer with intracranial metastasis. Patient is from home with wife. Will follow for possible needs at discharge.
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
## 2021-10-22 NOTE — PROGRESS NOTES
Oncology Hematology Care  Progress Note    Subjective: Other than loose cough, he feels well  S/P bronch w/ biopsy yesterday    Review of Systems:     Review of Systems   Constitutional: Positive for fatigue. Negative for fever. HENT: Negative. Eyes: Negative. Respiratory: Positive for cough (Loose). Negative for shortness of breath. Cardiovascular: Negative. Gastrointestinal: Negative. Endocrine: Negative. Genitourinary: Negative. Musculoskeletal: Negative. Skin: Negative. Allergic/Immunologic: Negative. Neurological: Negative. Negative for seizures, weakness and headaches. Hematological: Negative. Objective:     HISTORY OF PRESENT ILLNESS:  Mr. Romel Lyons  is a 73 y.o. male we are seeing in consultation for a radiographic picture highly suspicious for metastatic lung cancer. The chest CT shows a left hilar mass and the brain MRI shows multiple intracranial metastases. The patient was transferred to [facility] from [facility] due to the presence of multiple brain masses visualized on head CT. He originally presented to the [facility] ER by squad due to a suspected seizure. As noted, he was found to have masses on head CT. He was given dexamethasone and Keppra. A CT chest abdomen and pelvis was obtained that showed a left lower lobe lung mass with extension into the mediastinum. There was also a left adrenal mass. Apparently, in the summer 2021, the patient was evaluated for pneumonia with pleural effusion at [facility]. He underwent thoracentesis on 2 occasions and both times cytology was negative. He was treated with antibiotics and underwent a thoracic surgery procedure which I suspect was decortication. Dr. Adwoa Wright performed a bronchoscopy with biopsy.      Medications    Current Facility-Administered Medications: lactated ringers infusion, , IntraVENous, Continuous  dexamethasone (DECADRON) injection 4 mg, 4 mg, IntraVENous, Q6H  pantoprazole (PROTONIX) injection 40 mg, 40 mg, IntraVENous, Daily  levETIRAcetam (KEPPRA) 1,000 mg in sodium chloride 0.9 % 100 mL IVPB, 1,000 mg, IntraVENous, Q12H  sodium chloride flush 0.9 % injection 5-40 mL, 5-40 mL, IntraVENous, 2 times per day  sodium chloride flush 0.9 % injection 5-40 mL, 5-40 mL, IntraVENous, PRN  0.9 % sodium chloride infusion, 25 mL, IntraVENous, PRN  enoxaparin (LOVENOX) injection 40 mg, 40 mg, SubCUTAneous, Daily  ondansetron (ZOFRAN-ODT) disintegrating tablet 4 mg, 4 mg, Oral, Q8H PRN **OR** ondansetron (ZOFRAN) injection 4 mg, 4 mg, IntraVENous, Q6H PRN  polyethylene glycol (GLYCOLAX) packet 17 g, 17 g, Oral, Daily PRN  acetaminophen (TYLENOL) tablet 650 mg, 650 mg, Oral, Q6H PRN **OR** acetaminophen (TYLENOL) suppository 650 mg, 650 mg, Rectal, Q6H PRN  levothyroxine (SYNTHROID) tablet 100 mcg, 100 mcg, Oral, Daily    Allergies  No Known Allergies    Physical Exam  VITALS:  BP (!) 140/97   Pulse 75   Temp 98 °F (36.7 °C) (Oral)   Resp 16   Wt 210 lb 15.7 oz (95.7 kg)   SpO2 95%   BMI 27.09 kg/m²   TEMPERATURE:  Current - Temp: 98 °F (36.7 °C); Max - Temp  Av °F (36.7 °C)  Min: 97.7 °F (36.5 °C)  Max: 98.3 °F (36.8 °C)  PULSE OXIMETRY RANGE: SpO2  Av.6 %  Min: 81 %  Max: 100 %  24HR INTAKE/OUTPUT:    Intake/Output Summary (Last 24 hours) at 10/22/2021 4767  Last data filed at 10/22/2021 0534  Gross per 24 hour   Intake 1101.53 ml   Output 845 ml   Net 256.53 ml       Physical Exam  Constitutional:       General: He is not in acute distress. Eyes:      General: No scleral icterus. Cardiovascular:      Rate and Rhythm: Normal rate and regular rhythm. Heart sounds: No murmur heard. No gallop. Pulmonary:      Breath sounds: Rhonchi (scattered, L lung) present. Abdominal:      General: Bowel sounds are normal.      Palpations: Abdomen is soft. Tenderness: There is no abdominal tenderness. Musculoskeletal:         General: No swelling.    Lymphadenopathy: low-attenuation area within the right temporoparietal lobe is also noted. ORBITS: The orbits are unremarkable. SINUSES: Mucous retention cyst present in the bilateral maxillary sinuses. Mild mucosal hypertrophy involves the bilateral ethmoid air cells. SOFT TISSUES/SKULL:  The calvarium is intact. 1. Low attenuating areas within the right frontal and right temporoparietal lobes likely due to vasogenic edema from malignancy rather than age-indeterminate infarcts. Recommend further evaluation with MRI of the head with and without contrast for further evaluation. CT CERVICAL SPINE WO CONTRAST    Result Date: 10/20/2021  EXAMINATION: CT OF THE CERVICAL SPINE WITHOUT CONTRAST 10/20/2021 7:08 am: TECHNIQUE: CT of the cervical spine was performed without the administration of intravenous contrast. Multiplanar reformatted images are provided for review. Dose modulation, iterative reconstruction, and/or weight based adjustment of the mA/kV was utilized to reduce the radiation dose to as low as reasonably achievable. COMPARISON: 03/12/2007 HISTORY: ORDERING SYSTEM PROVIDED HISTORY: Dunia godinez fx TECHNOLOGIST PROVIDED HISTORY: Reason for exam:->kerry perez Decision Support Exception - unselect if not a suspected or confirmed emergency medical condition->Emergency Medical Condition (MA) Reason for Exam: kerry cornell Acuity: Acute Type of Exam: Initial FINDINGS: BONES/ALIGNMENT: There is no acute fracture. The 7 cervical vertebral bodies are in anatomic alignment. The craniocervical junction is intact. DEGENERATIVE CHANGES: There is moderate multilevel spondylosis and facet arthropathy. There is mild widening of the right C3-4 facet with mild irregularity of the articular surface which is likely due to degenerative changes as opposed to a traumatic injury. SOFT TISSUES: There is no prevertebral soft tissue swelling. Mild emphysema involves the bilateral upper lungs.   There is bibasilar atelectasis with mild interstitial edema. 1. No acute fracture. If the patient continues to experience pain, recommend MRI of the cervical spine for further evaluation. CT THORACIC SPINE WO CONTRAST    Result Date: 10/20/2021  EXAMINATION: CT OF THE THORACIC SPINE WITHOUT CONTRAST  10/20/2021 7:05 am: TECHNIQUE: CT of the thoracic spine was performed without the administration of intravenous contrast. Multiplanar reformatted images are provided for review. Dose modulation, iterative reconstruction, and/or weight based adjustment of the mA/kV was utilized to reduce the radiation dose to as low as reasonably achievable. COMPARISON: None. HISTORY: ORDERING SYSTEM PROVIDED HISTORY: kerry cornell TECHNOLOGIST PROVIDED HISTORY: Reason for exam:->kerry cornell Reason for Exam: kerry cornell Acuity: Acute Type of Exam: Initial FINDINGS: BONES/ALIGNMENT: There is normal alignment of the spine. The vertebral body heights are maintained. No osseous destructive lesion is seen. DEGENERATIVE CHANGES: Mild-to-moderate spondylosis of the spine with marginal osteophytes most significant in the mid to lower thoracic spine. No bony spinal canal stenosis. No significant listhesis. SOFT TISSUES: No paraspinal mass is seen. Multifocal consolidations most significant in the left lower lobe concerning for pneumonia. Other etiologies not excluded. No acute osseous abnormality. Moderate spondylosis. Consolidations within the visualized lung fields concerning for pneumonia with indeterminate left infrahilar consolidation. Recommend dedicated CT chest.     CT CHEST ABDOMEN PELVIS W CONTRAST    Result Date: 10/20/2021  EXAMINATION: CT OF THE CHEST, ABDOMEN, AND PELVIS WITH CONTRAST 10/20/2021 7:04 am; 10/20/2021 7:05 am TECHNIQUE: CT of the chest, abdomen and pelvis was performed with the administration of intravenous contrast. Multiplanar reformatted images are provided for review.  Dose modulation, iterative reconstruction, and/or weight based adjustment of the mA/kV was utilized to reduce the radiation dose to as low as reasonably achievable. COMPARISON: None HISTORY: ORDERING SYSTEM PROVIDED HISTORY: amd . trauma,, zx, pain ro omid oliverio Concepción Teodoro, ? free air divertic TECHNOLOGIST PROVIDED HISTORY: Reason for exam:->amd . trauma,, zx, pain ro omid oliverio Concepción Teodoro, ? free air divertic Additional Contrast?->None Decision Support Exception - unselect if not a suspected or confirmed emergency medical condition->Emergency Medical Condition (MA) Reason for Exam: amd . trauma,, zx, pain ro omid oliverio Concepción Teodoro, ? free air divertic Acuity: Acute Type of Exam: Initial; ORDERING SYSTEM PROVIDED HISTORY: kerry cornell TECHNOLOGIST PROVIDED HISTORY: Reason for exam:->kerry cornell Reason for Exam: kerry cornell Acuity: Acute Type of Exam: Initial CHEST: Mediastinum: 1. Mass surrounds the left hilum inferiorly. This could obscure associated infrahilar adenopathy. No significant mediastinal or right hilar adenopathy is seen otherwise. 2. The heart is mildly enlarged. There is no pericardial effusion. Coronary calcification is seen primarily LAD. 3. The aorta and pulmonary vessels are unremarkable for age. Lungs/pleura: 1. A large mass is seen surrounding and extending inferior to the left hilum. This measures 6.4 x 4.4 cm. Subtle mass protrudes into the left lower lobe bronchus, sample series 4, image 63. This is better seen on coronal image 82. This could potentially represent mucoid material.  In addition there is airspace disease peripherally into the lower lobe likely some degree of postobstructive volume loss or pneumonia. Pleural effusion is also present possibly malignant. 2. There is patchy bilateral airspace disease elsewhere greatest involving the right lower lobe posteriorly. This could be atelectatic disease however pneumonia or aspiration is also considered. 3. No pneumothorax. This is in spite of rib fractures as described below.  Soft Tissues/Bones: Bilateral rib fractures are present. Some of these are better seen on sagittal projection due to the fracture being within the axial plane. This involves the anterolateral right 3rd and 4th ribs as well as the left lateral 7th rib. Abdomen/Pelvis: Organs; 1. Liver: The density is unremarkable with no focal suspicious liver lesion. 2. Gallbladder: Gallstones are seen within the gallbladder without significant wall thickening. There is no biliary dilation. 3. Spleen: Normal. 4. Pancreas: Normal. 5. Kidneys: Unremarkable. No hydronephrosis or suspicious lesion. 6. Adrenal glands: Large mass is seen involving the left adrenal gland measuring 4.6 x 4.4 cm. The right adrenal gland is minimally enlarged. GI/Bowel: There is no distention, obstruction or significant inflammatory change. Multiple diverticuli are scattered throughout the colon, greater descending and sigmoid colon. There is some stool within the colon without distention. The appendix is normal. Pelvis: Urinary bladder is mostly empty with a Cano catheter present. There is no free pelvic fluid. Peritoneum/Retroperitoneum: No additional mass or retroperitoneal adenopathy. No ascites. Soft Tissues/Bones: No acute abnormality. .     Large left infrahilar mass highly suspicious for primary lung carcinoma with metastatic disease suspected to the left adrenal gland and possible malignant pleural effusion. Mass versus mucoid material is seen in the left lower bronchus focally. Postobstructive airspace disease could represent pneumonia or atelectasis. Bilateral anterolateral rib fractures as identified above without pneumothorax. Cholelithiasis. Patchy airspace disease right lower lung possible pneumonia versus aspiration versus atelectasis.      MRI BRAIN W WO CONTRAST    Result Date: 10/20/2021  EXAMINATION: MRI OF THE BRAIN WITHOUT AND WITH CONTRAST  10/20/2021 11:09 am TECHNIQUE: Multiplanar multisequence MRI of the head/brain was performed without and with 1. 4 cm. The 2nd largest lesion is a right frontal parietal lesion measuring 1.2 cm near the midline. There is mild edema associated with many of the lesions without midline shift or significant mass effect. CT LUMBAR SPINE TRAUMA RECONSTRUCTION    Result Date: 10/20/2021  EXAMINATION: CT OF THE LUMBAR SPINE WITHOUT CONTRAST  10/20/2021 TECHNIQUE: CT of the lumbar spine was performed without the administration of intravenous contrast. Multiplanar reformatted images are provided for review. Adjustment of mA and/or kV according to patient size was utilized. Dose modulation, iterative reconstruction, and/or weight based adjustment of the mA/kV was utilized to reduce the radiation dose to as low as reasonably achievable. COMPARISON: None HISTORY: ORDERING SYSTEM PROVIDED HISTORY: TRAUMA TECHNOLOGIST PROVIDED HISTORY: Reason for exam:->trauma, sz, pain Reason for Exam: traumna, sz Acuity: Acute Type of Exam: Initial FINDINGS: BONES/ALIGNMENT: There is normal alignment of the spine. The vertebral body heights are maintained. No osseous destructive lesion is seen. DEGENERATIVE CHANGES: Multilevel degenerative changes. SOFT TISSUES/RETROPERITONEUM: No paraspinal mass is seen.      No acute lumbar spine abnormality Multilevel degenerative disc disease and facet joint arthropathy       Pathology  pend    Problem List  Patient Active Problem List   Diagnosis    Intracranial mass    Lesion of lung    First time seizure ([address]    Hypothyroid       Assessment and Plan:     Left hilar mass with extension into the mediastinum  - Additional radiographic evidence of left adrenal metastasis and multiple intracranial metastases  - Pleural effusion  - Primary lung cancer is suspected  - Bronchoscopy with biopsy done Oct 21  - Pathology awaited  - Once we have pathology, we will also need molecular profiling  - Systemic treatment options will be dependent upon pathology and molecular profiling  - If this is small cell lung cancer, it would be best to begin Carbo/Etoposide while an inpatient. He would need a port for this treatment. [facility] is aware. Pt aware     Intracranial metastases  - Not a candidate for neurosurgical resection as this is multifocal disease  -Dr Geneva Santoro has seen the pt and is planning whole brain radiation  -Agree with steroids and Keppra     Seizures  -Secondary to multiple intracranial metastases  -Experienced a fall during seizure episode which resulted in bilateral anterolateral rib fractures  -Agree with Decadron and Keppra     Discussed with patient and he understands our concern for a primary lung cancer with intracranial metastasis.  He understands that smaal cell lung cancer is possible and if that is the case, that we would begin chemotherapy as an inpt.     D/W Dr Araceli Alvarez  D/W Dr Jaquelin Anderson  D/W nursing    Jesusita Livingston MD  10/22/2021

## 2021-10-22 NOTE — PROGRESS NOTES
Pulmonary Progress Note    Admit Date: 10/20/2021  Day: 3  Vent Day: None  IV Access:Peripheral  IV Fluids:None  Vasopressors:None                Antibiotics: None  Diet: ADULT DIET; Regular    CC: Seizure,     Interval history:     No complications post bronchoscopy other than a little bit of hemoptysis which is to be expected  Patient denies CP, SOB, Abdominal Pain, Nausea/Vomiting, Diarrhea, Constipation, Urinary/bladder complaints, neurological deficits,       HPI: Nikolas Flores is a 68-year-old male with past medical history of hypertension, hypothyroidism, chronic peripheral effusions, left rotator cuff tendinitis who presented with supposedly seizure this morning at 5 AM.     Patient had a uneventful night and went to sleep with his wife, and she woke up to him having a suspected seizure following his left arm and drinking from cytocide. Patient states she and her son attempted to calm him down and bring him out of it but he became increasingly agitated. They then called 911, and EMS arrived patient was combative and required 10 mg of Versed and restraints. Patient reportedly urinated on self and patient was taken to [place] ED. Patient's wife states he has never had seizure in the past, or any neurological illnesses. She states that had not been complaining of anything recently or had any recent illnesses. She denies him reporting headaches, chest pain, nausea/vomiting, diarrhea/constipation, weakness, neurological symptoms/deficits. Of note, patient had a decortication of his lungs in the past few days for \"pneumonia\" where they removed 20% of his lung. His wife also states that his lung problems began in May of this year a week after he had his second Covid shot.     In the ED, patient was found to be bradycardic with transient new onset atrial fibrillation with rapid ventricular rate. Due to his persistent bradycardia Precedex was discontinued and patient was given a dose of atropine.  With a blood pressure 104/75 saturating well. CT head reported unclear suspected frontal mass with potentially other lesions. MRI brain was done that revealed multiple supratentorial and infratentorial masses with mild midline shift. Patient was started on Decadron, loaded with Keppra due to suspected seizure. Lumbar puncture was done and concern for infection or subarachnoid hemorrhage which yielded no positive results. CT chest reported a large left perihilar infrahilar mass. Initial biopsy and thoracentesis was negative for malignancy. Patient was also found to have bilateral rib fractures unclear if from seizure or fall versus possible metastatic. Patient's delirium and agitation unclear if related to CNS mass and/or postictal state. Patient had undergone agitation control with ketamine and tolerated it well in the ED.     Upon evaluation in the ICU, patient is to be admitted for further evaluation of his acute delirium, new onset seizure, brain metastasis. Upon speaking to his wife patient has a previous DNR, however she is unsure to how he feels now is that was done in 2011. She states that they will keep him DNR-CCA for now until he is of sound mind to make any CODE STATUS changes.     Medications:     Scheduled Meds:   allopurinol  300 mg Oral Daily    levETIRAcetam  1,000 mg Oral BID    dexamethasone  4 mg Oral 4 times per day    [START ON 10/23/2021] pantoprazole  40 mg Oral QAM AC    [START ON 10/23/2021] etoposide (VEPESID) chemo IVPB  220 mg IntraVENous Q24H    [START ON 10/23/2021] CARBOplatin (PARAPLATIN) chemo IVPB (by AUC)  630 mg IntraVENous Once    [START ON 10/23/2021] ondansetron  24 mg Oral Once    sodium chloride flush  5-40 mL IntraVENous 2 times per day    enoxaparin  40 mg SubCUTAneous Daily    levothyroxine  100 mcg Oral Daily     Continuous Infusions:   sodium chloride      sodium chloride 10 mL/hr at 10/22/21 0442     PRN Meds:[START ON 10/24/2021] ondansetron **OR** [START ON 10/24/2021] ondansetron, sodium chloride flush, sodium chloride, polyethylene glycol, acetaminophen **OR** acetaminophen    Objective:   Vitals:   T-max:  Patient Vitals for the past 8 hrs:   BP Temp Temp src Pulse Resp   10/22/21 1628 (!) 144/90 97.7 °F (36.5 °C) Oral 66 18   10/22/21 1245 136/85 98.4 °F (36.9 °C) Oral 68 18   10/22/21 1110 134/89 97.7 °F (36.5 °C) -- 64 17       Intake/Output Summary (Last 24 hours) at 10/22/2021 1741  Last data filed at 10/22/2021 9188  Gross per 24 hour   Intake 401.53 ml   Output 895 ml   Net -493.47 ml       Review of Systems   Constitutional: Negative for chills, fatigue and fever. HENT: Negative. Eyes: Negative. Respiratory: Negative for cough, shortness of breath and wheezing. Cardiovascular: Negative for chest pain and leg swelling. Gastrointestinal: Negative for abdominal pain, constipation, diarrhea, nausea and vomiting. Endocrine: Negative. Genitourinary: Negative. Musculoskeletal: Positive for back pain and myalgias. Negative for arthralgias. Allergic/Immunologic: Negative. Neurological: Negative for dizziness, light-headedness, numbness and headaches. Hematological: Negative. Psychiatric/Behavioral: Negative. Physical Exam  Physical Exam  Vitals reviewed. Constitutional:       General: He is not in acute distress. Appearance: Normal appearance. He is normal weight. He is not ill-appearing or toxic-appearing. Comments: lethargic   HENT:      Head: Normocephalic and atraumatic. Nose: Nose normal.      Mouth/Throat:      Mouth: Mucous membranes are dry. Eyes:      General:         Right eye: No discharge. Left eye: No discharge. Extraocular Movements: Extraocular movements intact. Pupils: Pupils are equal, round, and reactive to light. Cardiovascular:      Rate and Rhythm: Normal rate and regular rhythm. Heart sounds: Normal heart sounds. No murmur heard. No friction rub. No gallop.     Pulmonary: Effort: Pulmonary effort is normal. No respiratory distress. Breath sounds: Normal breath sounds. No stridor. No wheezing, rhonchi or rales. Abdominal:      General: Bowel sounds are normal. There is no distension. Palpations: Abdomen is soft. There is no mass. Tenderness: There is no abdominal tenderness. There is no guarding or rebound. Hernia: No hernia is present. Musculoskeletal:         General: No swelling. Cervical back: Normal range of motion. Right lower leg: No edema. Left lower leg: No edema. Skin:     General: Skin is dry. Coloration: Skin is not jaundiced. Findings: Bruising (over OXANA hands, UE) present. Neurological:      Mental Status: He is oriented     Cranial Nerves: No cranial nerve deficit. Sensory: No sensory deficit. Motor: No weakness. Comments: Oriented x 4    LABS:    CBC:   Recent Labs     10/20/21  0614 10/21/21  0451 10/22/21  0418   WBC 12.0* 9.9 13.0*   HGB 13.9 13.3* 13.3*   HCT 42.2 40.5 40.5    209 234   MCV 86.2 86.4 86.3     Renal:    Recent Labs     10/20/21  0614 10/21/21  0451 10/22/21  0418    140 140   K 4.5 4.1 4.3    104 104   CO2 19* 24 24   BUN 24* 24* 26*   CREATININE 1.0 0.7* 0.8   GLUCOSE 185* 131* 151*   CALCIUM 9.6 9.2 8.8   PHOS  --   --  3.1   ANIONGAP 16 12 12     Hepatic:   Recent Labs     10/20/21  0614   AST 17   ALT 20   BILITOT 0.3   BILIDIR <0.2   PROT 7.0   LABALBU 3.9   ALKPHOS 86     Troponin:   Recent Labs     10/20/21  0614   TROPONINI <0.01     ABGs:    Recent Labs     10/20/21  0616   PHART 7.300*   [id number] 51.0*   PO2ART 239.0*   [id number] 25.1   BEART -2.1   [id number] >100.0   [id number] 59.8       INR:   Recent Labs     10/21/21  0451   INR 0.96     Surgical pathology  FINAL DIAGNOSIS:     Forceps biopsy, lesion left lower lobe lung:   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 See case comment. Assessment/Plan:   Millicent Jeans is a 73 y.o. male, who presents to the ICU with new onset seizure, acute delirium, brain lesion metastasis    New onset seizures 2/2 intracranial mass  - CTH (10/20) Low attenuating areas within the right frontal and right temporoparietal lobes likely due to vasogenic edema from malignancy rather than age-indeterminate infarcts  - MRI brain (10/20) Multiple brain parenchymal metastases in the supratentorial and  infratentorial brain. There are approximately 18 metastatic lesions scattered throughout the brain.  The largest lesion is in the right temporal lobe and measures 1.4 cm.  The 2nd largest lesion is a right frontal parietal lesion measuring 1.2 cm near the midline.  There is mild edema associated with many of the lesions without midline shift or significant mass effect.  - LP (10/20) negative, CSF studies WNL. CSF cytology shows no malignancy  - continue keppra, decadron  -Neurology following  - Neurosurgery following, agree with neurochecks, Decadron, Keppra. No dextrose ivf, keep sodium within normal limits, okay for DVT prophylaxis  - seizure precautions       Lung mass - left lower lobe mass with mediastinal lymphadenopathy. Forcep biopsy of left lower lobe mass shows small cell lung cancer. FNA of mediastinal lymphadenopathy still pending but is likely small cell as well  Dr. Douglas Bui (Medical oncology) aware and will be starting chemotherapy as inpatient  Dr. Grace Silverman of radiation oncology following      Code Status: DNR-CCA  FEN: Regular diet  PPX: lovenox  DISPO:BCC    Will sign off.   Call with any pulmonary critical care issues    Dorothy Cardona MD,   10/22/21  5:41 PM

## 2021-10-22 NOTE — PLAN OF CARE
Problem: Pain:  Goal: Control of acute pain  Description: Control of acute pain  10/22/2021 1634 by Bradley Munson RN  Outcome: Ongoing     Problem: Pain:  Goal: Control of chronic pain  Description: Control of chronic pain  10/22/2021 1634 by Bradley Munson RN  Outcome: Ongoing  10/22/2021 0515 by Naresh Duque RN  Outcome: Ongoing     Problem: Falls - Risk of:  Goal: Will remain free from falls  Description: Will remain free from falls  10/22/2021 1634 by Bradley Munson RN  Outcome: Ongoing  10/22/2021 0515 by Naresh Duque RN  Outcome: Ongoing

## 2021-10-22 NOTE — PROGRESS NOTES
PRE-OP NOTE  Department of Surgery      Chief Complaint or Reason for Surgery: Metastatic SCLC (Left)    Procedure: Port Placement  Expected time:  10/23/2021 TBD    Plan  1. Diet: NPO at midnight  2. IVF: per primary  3. Antibiotics: Ancef OCTOR  4. Labs to be drawn: Type and Screen  5. Anesthesia: to see patient  6. Consent: Signed in chart. DNR suspension in chart  7. Pulmonary: CXR: Not required - CT chest reviewed from 10/20  8.  Cardiac: EKG: reviewed 10/20/2021 - sinus leena      Marie Mcintyre DO  10/22/21  6:15 PM

## 2021-10-23 ENCOUNTER — APPOINTMENT (OUTPATIENT)
Dept: GENERAL RADIOLOGY | Age: 71
DRG: 166 | End: 2021-10-23
Attending: INTERNAL MEDICINE
Payer: MEDICARE

## 2021-10-23 ENCOUNTER — ANESTHESIA (OUTPATIENT)
Dept: OPERATING ROOM | Age: 71
DRG: 166 | End: 2021-10-23
Payer: MEDICARE

## 2021-10-23 ENCOUNTER — ANESTHESIA EVENT (OUTPATIENT)
Dept: OPERATING ROOM | Age: 71
DRG: 166 | End: 2021-10-23
Payer: MEDICARE

## 2021-10-23 VITALS
SYSTOLIC BLOOD PRESSURE: 138 MMHG | RESPIRATION RATE: 18 BRPM | OXYGEN SATURATION: 97 % | DIASTOLIC BLOOD PRESSURE: 74 MMHG

## 2021-10-23 LAB
ABO/RH: NORMAL
ANION GAP SERPL CALCULATED.3IONS-SCNC: 10 MMOL/L (ref 3–16)
ANTIBODY SCREEN: NORMAL
BUN BLDV-MCNC: 25 MG/DL (ref 7–20)
CALCIUM SERPL-MCNC: 8.7 MG/DL (ref 8.3–10.6)
CHLORIDE BLD-SCNC: 106 MMOL/L (ref 99–110)
CO2: 24 MMOL/L (ref 21–32)
CREAT SERPL-MCNC: 0.8 MG/DL (ref 0.8–1.3)
GFR AFRICAN AMERICAN: >60
GFR NON-AFRICAN AMERICAN: >60
GLUCOSE BLD-MCNC: 142 MG/DL (ref 70–99)
HCT VFR BLD CALC: 39 % (ref 40.5–52.5)
HEMOGLOBIN: 12.7 G/DL (ref 13.5–17.5)
HERPES SIMPLEX VIRUS BY PCR: NOT DETECTED
HSV SOURCE: NORMAL
INR BLD: 0.96 (ref 0.88–1.12)
MCH RBC QN AUTO: 28.5 PG (ref 26–34)
MCHC RBC AUTO-ENTMCNC: 32.5 G/DL (ref 31–36)
MCV RBC AUTO: 87.7 FL (ref 80–100)
PDW BLD-RTO: 16.6 % (ref 12.4–15.4)
PLATELET # BLD: 201 K/UL (ref 135–450)
PMV BLD AUTO: 9.1 FL (ref 5–10.5)
POTASSIUM REFLEX MAGNESIUM: 4.4 MMOL/L (ref 3.5–5.1)
PROTHROMBIN TIME: 10.8 SEC (ref 9.9–12.7)
RBC # BLD: 4.44 M/UL (ref 4.2–5.9)
SODIUM BLD-SCNC: 140 MMOL/L (ref 136–145)
WBC # BLD: 10.4 K/UL (ref 4–11)

## 2021-10-23 PROCEDURE — 2060000000 HC ICU INTERMEDIATE R&B

## 2021-10-23 PROCEDURE — 96417 CHEMO IV INFUS EACH ADDL SEQ: CPT

## 2021-10-23 PROCEDURE — 3209999900 FLUORO FOR SURGICAL PROCEDURES

## 2021-10-23 PROCEDURE — 6360000002 HC RX W HCPCS: Performed by: STUDENT IN AN ORGANIZED HEALTH CARE EDUCATION/TRAINING PROGRAM

## 2021-10-23 PROCEDURE — 6370000000 HC RX 637 (ALT 250 FOR IP): Performed by: INTERNAL MEDICINE

## 2021-10-23 PROCEDURE — 71045 X-RAY EXAM CHEST 1 VIEW: CPT

## 2021-10-23 PROCEDURE — 2580000003 HC RX 258: Performed by: STUDENT IN AN ORGANIZED HEALTH CARE EDUCATION/TRAINING PROGRAM

## 2021-10-23 PROCEDURE — 86850 RBC ANTIBODY SCREEN: CPT

## 2021-10-23 PROCEDURE — 7100000001 HC PACU RECOVERY - ADDTL 15 MIN: Performed by: SURGERY

## 2021-10-23 PROCEDURE — 96413 CHEMO IV INFUSION 1 HR: CPT

## 2021-10-23 PROCEDURE — C1894 INTRO/SHEATH, NON-LASER: HCPCS | Performed by: SURGERY

## 2021-10-23 PROCEDURE — 6370000000 HC RX 637 (ALT 250 FOR IP): Performed by: STUDENT IN AN ORGANIZED HEALTH CARE EDUCATION/TRAINING PROGRAM

## 2021-10-23 PROCEDURE — 6360000002 HC RX W HCPCS: Performed by: INTERNAL MEDICINE

## 2021-10-23 PROCEDURE — 86900 BLOOD TYPING SEROLOGIC ABO: CPT

## 2021-10-23 PROCEDURE — 3700000001 HC ADD 15 MINUTES (ANESTHESIA): Performed by: SURGERY

## 2021-10-23 PROCEDURE — 77001 FLUOROGUIDE FOR VEIN DEVICE: CPT | Performed by: SURGERY

## 2021-10-23 PROCEDURE — 3700000000 HC ANESTHESIA ATTENDED CARE: Performed by: SURGERY

## 2021-10-23 PROCEDURE — 3600000002 HC SURGERY LEVEL 2 BASE: Performed by: SURGERY

## 2021-10-23 PROCEDURE — 36415 COLL VENOUS BLD VENIPUNCTURE: CPT

## 2021-10-23 PROCEDURE — 2709999900 HC NON-CHARGEABLE SUPPLY: Performed by: SURGERY

## 2021-10-23 PROCEDURE — 6360000002 HC RX W HCPCS: Performed by: SURGERY

## 2021-10-23 PROCEDURE — 6360000002 HC RX W HCPCS: Performed by: ANESTHESIOLOGY

## 2021-10-23 PROCEDURE — 2580000003 HC RX 258: Performed by: INTERNAL MEDICINE

## 2021-10-23 PROCEDURE — 96415 CHEMO IV INFUSION ADDL HR: CPT

## 2021-10-23 PROCEDURE — 36561 INSERT TUNNELED CV CATH: CPT | Performed by: SURGERY

## 2021-10-23 PROCEDURE — 86901 BLOOD TYPING SEROLOGIC RH(D): CPT

## 2021-10-23 PROCEDURE — 85610 PROTHROMBIN TIME: CPT

## 2021-10-23 PROCEDURE — 85027 COMPLETE CBC AUTOMATED: CPT

## 2021-10-23 PROCEDURE — 0JH60WZ INSERTION OF TOTALLY IMPLANTABLE VASCULAR ACCESS DEVICE INTO CHEST SUBCUTANEOUS TISSUE AND FASCIA, OPEN APPROACH: ICD-10-PCS | Performed by: SURGERY

## 2021-10-23 PROCEDURE — C1788 PORT, INDWELLING, IMP: HCPCS | Performed by: SURGERY

## 2021-10-23 PROCEDURE — 02HV33Z INSERTION OF INFUSION DEVICE INTO SUPERIOR VENA CAVA, PERCUTANEOUS APPROACH: ICD-10-PCS | Performed by: SURGERY

## 2021-10-23 PROCEDURE — 3600000012 HC SURGERY LEVEL 2 ADDTL 15MIN: Performed by: SURGERY

## 2021-10-23 PROCEDURE — 2500000003 HC RX 250 WO HCPCS: Performed by: SURGERY

## 2021-10-23 PROCEDURE — 80048 BASIC METABOLIC PNL TOTAL CA: CPT

## 2021-10-23 PROCEDURE — 7100000000 HC PACU RECOVERY - FIRST 15 MIN: Performed by: SURGERY

## 2021-10-23 DEVICE — PORT INFUS SGL LUMN ATTCH POLYUR OPN END CATH 8FR POWERPRT: Type: IMPLANTABLE DEVICE | Site: SUBCLAVIAN | Status: FUNCTIONAL

## 2021-10-23 RX ORDER — HYDRALAZINE HYDROCHLORIDE 20 MG/ML
5 INJECTION INTRAMUSCULAR; INTRAVENOUS EVERY 10 MIN PRN
Status: DISCONTINUED | OUTPATIENT
Start: 2021-10-23 | End: 2021-10-23 | Stop reason: HOSPADM

## 2021-10-23 RX ORDER — PROMETHAZINE HYDROCHLORIDE 25 MG/ML
6.25 INJECTION, SOLUTION INTRAMUSCULAR; INTRAVENOUS
Status: DISCONTINUED | OUTPATIENT
Start: 2021-10-23 | End: 2021-10-23 | Stop reason: HOSPADM

## 2021-10-23 RX ORDER — PROPOFOL 10 MG/ML
INJECTION, EMULSION INTRAVENOUS PRN
Status: DISCONTINUED | OUTPATIENT
Start: 2021-10-23 | End: 2021-10-23 | Stop reason: SDUPTHER

## 2021-10-23 RX ORDER — BUPIVACAINE HYDROCHLORIDE 5 MG/ML
INJECTION, SOLUTION EPIDURAL; INTRACAUDAL PRN
Status: DISCONTINUED | OUTPATIENT
Start: 2021-10-23 | End: 2021-10-23 | Stop reason: ALTCHOICE

## 2021-10-23 RX ORDER — ATORVASTATIN CALCIUM 40 MG/1
40 TABLET, FILM COATED ORAL DAILY
COMMUNITY
End: 2022-01-31 | Stop reason: ALTCHOICE

## 2021-10-23 RX ORDER — LABETALOL HYDROCHLORIDE 5 MG/ML
5 INJECTION, SOLUTION INTRAVENOUS EVERY 10 MIN PRN
Status: DISCONTINUED | OUTPATIENT
Start: 2021-10-23 | End: 2021-10-23 | Stop reason: HOSPADM

## 2021-10-23 RX ORDER — MEPERIDINE HYDROCHLORIDE 25 MG/ML
12.5 INJECTION INTRAMUSCULAR; INTRAVENOUS; SUBCUTANEOUS EVERY 5 MIN PRN
Status: DISCONTINUED | OUTPATIENT
Start: 2021-10-23 | End: 2021-10-23 | Stop reason: HOSPADM

## 2021-10-23 RX ORDER — FENTANYL CITRATE 50 UG/ML
INJECTION, SOLUTION INTRAMUSCULAR; INTRAVENOUS PRN
Status: DISCONTINUED | OUTPATIENT
Start: 2021-10-23 | End: 2021-10-23 | Stop reason: SDUPTHER

## 2021-10-23 RX ORDER — HEPARIN SODIUM (PORCINE) LOCK FLUSH IV SOLN 100 UNIT/ML 100 UNIT/ML
SOLUTION INTRAVENOUS PRN
Status: DISCONTINUED | OUTPATIENT
Start: 2021-10-23 | End: 2021-10-23 | Stop reason: ALTCHOICE

## 2021-10-23 RX ORDER — OXYCODONE HYDROCHLORIDE 5 MG/1
10 TABLET ORAL PRN
Status: DISCONTINUED | OUTPATIENT
Start: 2021-10-23 | End: 2021-10-23 | Stop reason: HOSPADM

## 2021-10-23 RX ORDER — OXYCODONE HYDROCHLORIDE 5 MG/1
5 TABLET ORAL PRN
Status: DISCONTINUED | OUTPATIENT
Start: 2021-10-23 | End: 2021-10-23 | Stop reason: HOSPADM

## 2021-10-23 RX ORDER — LISINOPRIL 10 MG/1
10 TABLET ORAL DAILY
COMMUNITY
End: 2022-01-27

## 2021-10-23 RX ORDER — MIDAZOLAM HYDROCHLORIDE 1 MG/ML
INJECTION INTRAMUSCULAR; INTRAVENOUS PRN
Status: DISCONTINUED | OUTPATIENT
Start: 2021-10-23 | End: 2021-10-23 | Stop reason: SDUPTHER

## 2021-10-23 RX ORDER — ASPIRIN 81 MG/1
81 TABLET ORAL DAILY
COMMUNITY

## 2021-10-23 RX ORDER — MORPHINE SULFATE 4 MG/ML
1 INJECTION, SOLUTION INTRAMUSCULAR; INTRAVENOUS EVERY 5 MIN PRN
Status: DISCONTINUED | OUTPATIENT
Start: 2021-10-23 | End: 2021-10-23 | Stop reason: HOSPADM

## 2021-10-23 RX ORDER — DIPHENHYDRAMINE HYDROCHLORIDE 50 MG/ML
12.5 INJECTION INTRAMUSCULAR; INTRAVENOUS
Status: DISCONTINUED | OUTPATIENT
Start: 2021-10-23 | End: 2021-10-23 | Stop reason: HOSPADM

## 2021-10-23 RX ORDER — METOCLOPRAMIDE HYDROCHLORIDE 5 MG/ML
10 INJECTION INTRAMUSCULAR; INTRAVENOUS
Status: DISCONTINUED | OUTPATIENT
Start: 2021-10-23 | End: 2021-10-23 | Stop reason: HOSPADM

## 2021-10-23 RX ORDER — LISINOPRIL 10 MG/1
10 TABLET ORAL DAILY
Status: DISCONTINUED | OUTPATIENT
Start: 2021-10-23 | End: 2021-10-26 | Stop reason: HOSPADM

## 2021-10-23 RX ADMIN — MIDAZOLAM HYDROCHLORIDE 1 MG: 2 INJECTION, SOLUTION INTRAMUSCULAR; INTRAVENOUS at 18:44

## 2021-10-23 RX ADMIN — ETOPOSIDE 220 MG: 20 INJECTION INTRAVENOUS at 21:12

## 2021-10-23 RX ADMIN — FENTANYL CITRATE 50 MCG: 50 INJECTION, SOLUTION INTRAMUSCULAR; INTRAVENOUS at 18:42

## 2021-10-23 RX ADMIN — MIDAZOLAM HYDROCHLORIDE 1 MG: 2 INJECTION, SOLUTION INTRAMUSCULAR; INTRAVENOUS at 18:42

## 2021-10-23 RX ADMIN — PROPOFOL 30 MG: 10 INJECTION, EMULSION INTRAVENOUS at 18:46

## 2021-10-23 RX ADMIN — SODIUM CHLORIDE, PRESERVATIVE FREE 10 ML: 5 INJECTION INTRAVENOUS at 21:12

## 2021-10-23 RX ADMIN — LEVETIRACETAM 1000 MG: 500 TABLET, FILM COATED ORAL at 08:28

## 2021-10-23 RX ADMIN — CEFAZOLIN 2000 MG: 10 INJECTION, POWDER, FOR SOLUTION INTRAVENOUS at 08:18

## 2021-10-23 RX ADMIN — PANTOPRAZOLE SODIUM 40 MG: 40 TABLET, DELAYED RELEASE ORAL at 06:47

## 2021-10-23 RX ADMIN — FENTANYL CITRATE 50 MCG: 50 INJECTION, SOLUTION INTRAMUSCULAR; INTRAVENOUS at 18:44

## 2021-10-23 RX ADMIN — DEXAMETHASONE 4 MG: 4 TABLET ORAL at 06:46

## 2021-10-23 RX ADMIN — PROPOFOL 30 MG: 10 INJECTION, EMULSION INTRAVENOUS at 19:00

## 2021-10-23 RX ADMIN — LISINOPRIL 10 MG: 10 TABLET ORAL at 10:43

## 2021-10-23 RX ADMIN — LEVETIRACETAM 1000 MG: 500 TABLET, FILM COATED ORAL at 20:36

## 2021-10-23 RX ADMIN — DEXAMETHASONE 4 MG: 4 TABLET ORAL at 12:49

## 2021-10-23 RX ADMIN — SODIUM CHLORIDE, PRESERVATIVE FREE 10 ML: 5 INJECTION INTRAVENOUS at 10:25

## 2021-10-23 RX ADMIN — ALLOPURINOL 300 MG: 300 TABLET ORAL at 08:28

## 2021-10-23 RX ADMIN — LEVOTHYROXINE SODIUM 100 MCG: 0.1 TABLET ORAL at 06:45

## 2021-10-23 RX ADMIN — ONDANSETRON HYDROCHLORIDE 24 MG: 8 TABLET, FILM COATED ORAL at 20:36

## 2021-10-23 RX ADMIN — SODIUM CHLORIDE: 9 INJECTION, SOLUTION INTRAVENOUS at 12:03

## 2021-10-23 RX ADMIN — SODIUM CHLORIDE: 9 INJECTION, SOLUTION INTRAVENOUS at 20:53

## 2021-10-23 RX ADMIN — PROPOFOL 30 MG: 10 INJECTION, EMULSION INTRAVENOUS at 18:53

## 2021-10-23 RX ADMIN — DEXAMETHASONE 4 MG: 4 TABLET ORAL at 17:37

## 2021-10-23 ASSESSMENT — PULMONARY FUNCTION TESTS
PIF_VALUE: 1
PIF_VALUE: 1
PIF_VALUE: 0
PIF_VALUE: 0
PIF_VALUE: 1
PIF_VALUE: 0
PIF_VALUE: 1
PIF_VALUE: 1
PIF_VALUE: 0
PIF_VALUE: 1
PIF_VALUE: 0
PIF_VALUE: 1
PIF_VALUE: 0
PIF_VALUE: 0
PIF_VALUE: 1
PIF_VALUE: 1
PIF_VALUE: 0
PIF_VALUE: 0
PIF_VALUE: 1
PIF_VALUE: 0
PIF_VALUE: 0
PIF_VALUE: 1

## 2021-10-23 ASSESSMENT — PAIN SCALES - GENERAL
PAINLEVEL_OUTOF10: 0

## 2021-10-23 NOTE — ANESTHESIA POSTPROCEDURE EVALUATION
Department of Anesthesiology  Postprocedure Note    Patient: Krzysztof Vu  MRN: [id number]  YOB: 1950  Date of evaluation: 10/23/2021  Time:  7:22 PM     Procedure Summary     Date: 10/23/21 Room / Location: [facility] [address]    Anesthesia Start: 1833 Anesthesia Stop: 1921    Procedure: RIGHT SUBCLAVIAN PORT A CATH INSERTION (N/A ) Diagnosis: (LUNG CANCER)    Surgeons: Kayden Javier MD Responsible Provider: Emily Lemon MD    Anesthesia Type: general ASA Status: 3          Anesthesia Type: general    Oliverio Phase I: Oliverio Score: 10    Oliverio Phase II:      Last vitals: Reviewed and per EMR flowsheets.        Anesthesia Post Evaluation    Patient location during evaluation: PACU  Patient participation: complete - patient participated  Level of consciousness: awake and alert  Pain score: 0  Airway patency: patent  Nausea & Vomiting: no nausea and no vomiting  Complications: no  Cardiovascular status: hemodynamically stable  Respiratory status: acceptable  Hydration status: euvolemic

## 2021-10-23 NOTE — ANESTHESIA PRE PROCEDURE
Department of Anesthesiology  Preprocedure Note       Name:  Mehrdad Smith   Age:  73 y.o.  :  1950                                          MRN:  [id number]         Date:  10/23/2021      Surgeon: Olivia Senior):  Jose Martinez MD    Procedure: Procedure(s):  PORT A CATH INSERTION    Medications prior to admission:   Prior to Admission medications    Medication Sig Start Date End Date Taking?  Authorizing Provider   lisinopril (PRINIVIL;ZESTRIL) 10 MG tablet Take 10 mg by mouth daily   Yes Historical Provider, MD   atorvastatin (LIPITOR) 40 MG tablet Take 40 mg by mouth daily   Yes Historical Provider, MD   aspirin 81 MG EC tablet Take 81 mg by mouth daily   Yes Historical Provider, MD       Current medications:    Current Facility-Administered Medications   Medication Dose Route Frequency Provider Last Rate Last Admin    lisinopril (PRINIVIL;ZESTRIL) tablet 10 mg  10 mg Oral Daily Caroline Chahal MD   10 mg at 10/23/21 1043    allopurinol (ZYLOPRIM) tablet 300 mg  300 mg Oral Daily En Escobar MD   300 mg at 10/23/21 1993    levETIRAcetam (KEPPRA) tablet 1,000 mg  1,000 mg Oral BID Caroline Chahal MD   1,000 mg at 10/23/21 0828    dexamethasone (DECADRON) tablet 4 mg  4 mg Oral 4 times per day Caroline Chahal MD   4 mg at 10/23/21 1249    pantoprazole (PROTONIX) tablet 40 mg  40 mg Oral QAM AC Caroline Chahal MD   40 mg at 10/23/21 0647    etoposide (VEPESID) 220 mg in sodium chloride 0.9 % 600 mL chemo IVPB  220 mg IntraVENous Q24H En Escobar MD        CARBOplatin (PARAPLATIN) 630 mg in sodium chloride 0.9 % 100 mL chemo IVPB  630 mg IntraVENous Once En Escobar MD        ondansetron [facility]) tablet 24 mg  24 mg Oral Once En Escobar MD        0.9 % sodium chloride infusion   IntraVENous Continuous En Escobar  mL/hr at 10/23/21 1203 New Bag at 10/23/21 1203    [START ON 10/24/2021] ondansetron (ZOFRAN-ODT) disintegrating tablet 4 mg  4 mg Oral Q8H PRN En Escobar MD Or    [START ON 10/24/2021] ondansetron (ZOFRAN) injection 4 mg  4 mg IntraVENous Q6H PRN Nisha Diaz MD        sodium chloride flush 0.9 % injection 5-40 mL  5-40 mL IntraVENous 2 times per day Pato Do, DO   10 mL at 10/23/21 1025    sodium chloride flush 0.9 % injection 5-40 mL  5-40 mL IntraVENous PRN Pato Do, DO        0.9 % sodium chloride infusion  25 mL IntraVENous PRN Pato Do, DO 10 mL/hr at 10/22/21 0442 Rate Verify at 10/22/21 0442    enoxaparin (LOVENOX) injection 40 mg  40 mg SubCUTAneous Daily Pato Do, DO   40 mg at 10/22/21 8029    polyethylene glycol (GLYCOLAX) packet 17 g  17 g Oral Daily PRN Pato Do, DO        acetaminophen (TYLENOL) tablet 650 mg  650 mg Oral Q6H PRN Pato Do, DO   650 mg at 10/22/21 1120    Or    acetaminophen (TYLENOL) suppository 650 mg  650 mg Rectal Q6H PRN Pato Do, DO        levothyroxine (SYNTHROID) tablet 100 mcg  100 mcg Oral Daily Pato Do, DO   100 mcg at 10/23/21 2540       Allergies:  No Known Allergies    Problem List:    Patient Active Problem List   Diagnosis Code    Intracranial mass R90.0    Lesion of lung R91.1    First time seizure ([facility] [address] R56.9    Hypothyroid E03.9       Past Medical History:        Diagnosis Date    Arthritis     left shoulder    Diverticula of colon     Fx ankle     LEFT    Hypertension     Hypothyroid     Pneumonia 06/24/2021    Prostate enlargement     BENIGN    Seizure ([address] 10/19/2021       Past Surgical History:        Procedure Laterality Date    BRONCHOSCOPY N/A 10/21/2021    BRONCHOSCOPY ENDOBRONCHIAL ULTRASOUND performed by Elie Meraz MD at [address] [place] [address]  10/21/2021    BRONCHOSCOPY BIOPSY BRONCHUS performed by Elie Meraz MD at [address] [place] [address]  10/21/2021    BRONCHOSCOPY BRUSHINGS performed by Elie Meraz MD at [address] [facility] [address] [place] [address]    THORACOTOMY  10/13/2021       Social History:    Social History     Tobacco Use    Smoking status: Former Smoker     Quit date: 10/21/2001     Years since quittin.0    Smokeless tobacco: Never Used   Substance Use Topics    Alcohol use: Not Currently                                Counseling given: Not Answered      Vital Signs (Current):   Vitals:    10/23/21 0412 10/23/21 0819 10/23/21 1041 10/23/21 1152   BP: (!) 152/99 (!) 149/93 136/85 (!) 145/93   Pulse: 68 57 55 51   Resp: 18 18 18 18   Temp: 97.6 °F (36.4 °C) 97.7 °F (36.5 °C) 97.9 °F (36.6 °C) 97.5 °F (36.4 °C)   TempSrc: Oral Oral Oral Oral   SpO2: 94% 96% 94% 97%   Weight:  212 lb 8 oz (96.4 kg)     Height:  6' 2\" (1.88 m)                                                BP Readings from Last 3 Encounters:   10/23/21 (!) 145/93   10/21/21 105/64   10/20/21 (!) 131/96       NPO Status: Time of last liquid consumption: 0000                        Time of last solid consumption: 1700                        Date of last liquid consumption: 10/21/21                        Date of last solid food consumption: 10/19/21    BMI:   Wt Readings from Last 3 Encounters:   10/23/21 212 lb 8 oz (96.4 kg)   10/20/21 210 lb (95.3 kg)     Body mass index is 27.28 kg/m².     CBC:   Lab Results   Component Value Date    WBC 10.4 10/23/2021    RBC 4.44 10/23/2021    HGB 12.7 10/23/2021    HCT 39.0 10/23/2021    MCV 87.7 10/23/2021    RDW 16.6 10/23/2021     10/23/2021       CMP:   Lab Results   Component Value Date     10/23/2021    K 4.4 10/23/2021     10/23/2021    CO2 24 10/23/2021    BUN 25 10/23/2021    CREATININE 0.8 10/23/2021    GFRAA >60 10/23/2021    GFRAA >60 2010    AGRATIO 1.3 10/20/2021    LABGLOM >60 10/23/2021    GLUCOSE 142 10/23/2021    PROT 7.0 10/20/2021    CALCIUM 8.7 10/23/2021    BILITOT 0.3 10/20/2021    ALKPHOS 86 10/20/2021    AST 17 10/20/2021    ALT 20 10/20/2021       POC Tests: No results for input(s): POCGLU, POCNA, POCK, POCCL, POCBUN, POCHEMO, POCHCT in the last 72 hours.    Coags:   Lab Results   Component Value Date    PROTIME 10.8 10/23/2021    INR 0.96 10/23/2021       HCG (If Applicable): No results found for: PREGTESTUR, PREGSERUM, HCG, HCGQUANT     ABGs:   Lab Results   Component Value Date    PHART 7.300 10/20/2021    PO2ART 239.0 10/20/2021    NMZ6ZQH 51.0 10/20/2021    POK7TFJ 25.1 10/20/2021    BEART -2.1 10/20/2021    M0CRYVNF >100.0 10/20/2021        Type & Screen (If Applicable):  No results found for: LABABO, LABRH    Drug/Infectious Status (If Applicable):  No results found for: HIV, HEPCAB    COVID-19 Screening (If Applicable): No results found for: COVID19        Anesthesia Evaluation  Patient summary reviewed and Nursing notes reviewed no history of anesthetic complications:   Airway: Mallampati: II  TM distance: >3 FB   Neck ROM: full  Mouth opening: > = 3 FB Dental:          Pulmonary:                              Cardiovascular:    (+) hypertension:,                   Neuro/Psych:   Negative Neuro/Psych ROS              GI/Hepatic/Renal: Neg GI/Hepatic/Renal ROS            Endo/Other:    (+) hypothyroidism::., .                 Abdominal:             Vascular: negative vascular ROS. Other Findings:             Anesthesia Plan      general     ASA 1    (22-year-old male presents for insertion of port-a-cath. Plan general anesthesia with ASA standard monitors. Questions answered. Patient agreeable with anesthetic plan.  )  Induction: intravenous. Anesthetic plan and risks discussed with patient.         Attending anesthesiologist reviewed and agrees with Gustavo Mehta MD   10/23/2021

## 2021-10-23 NOTE — BRIEF OP NOTE
OPERATIVE NOTE     Patient: Shoshana Felipe  : 1950  MRN: [id number]     PREOPERATIVE DIAGNOSIS: Small cell lung cancer     POSTOPERATIVE DIAGNOSIS: Same     PROCEDURE: Right subclavian tunneled single lumen Port-A-Cath insertion with fluoroscopic guidance     SURGEON: Obdulia Roland MD    ANESTHESIA: MAC + Local     ESTIMATED BLOOD LOSS: Minimal     COMPLICATIONS: None    INDICATIONS: Port-A-Cath placement for chemotherapy as recommended by patient's oncologist.    Risks, benefits, and alternatives discussed with patient and any available family members in the preoperative area. Risks including but not limited to: bleeding, infection, hematoma, major vessel injury, malposition, need for re-operation or removal, and pneumothorax were discussed. All questions answered and patient consented to proceed. PROCEDURE DETAILS:  The patient was brought to the operating theater and placed in the supine position with arms padded and tucked at sides. A towel roll was placed between the scapulae. The patient's bilateral upper chest and neck was then prepped and draped in sterile fashion using chlorhexidine solution. A time-out was performed confirming the patient's identity and operative site. Antibiotics were confirmed to be infusing. SCDs were on and functioning. All safety points were followed per hospital protocol. Sedation was started by anesthesia provider. Patient was placed in Trendelenburg position. The right infraclavicular fossa was anesthetized with local anesthetic. The right subclavian vein was entered on the 1st attempt with return of venous blood. Guidewire was then positioned in the vena cava. This was confirmed using fluoroscopy. 4 cm pocket for the port was planned for the anterior chest wall 2-3 cm below the guidewire insertion site. The skin and tunnel tract were anesthetized with local anesthetic. Incision was then made using a 11-blade scalpel.  Electrocautery was then used to deepen the incision through subcutaneous tissue and a pocket was created in the subcutaneous tissue superficial to the fascia. Next, 11-blade scalpel was used to extend the guidewire exit site to a 1 cm incision. The catheter was then tunneled subcutaneously to the exit site of the guidewire. The dilator and sheath were placed over the guidewire in the vena cava under fluoroscopic guidance using Seldinger technique. The dilator and wire were subsequently removed. The catheter was then threaded through the sheath into the vena cava. The tip was positioned close to the cavoatrial junction, as confirmed using fluoroscopy. The catheter was cut to appropriate length; the port/catheter locking mechanism was placed onto the catheter and the catheter was secured to the port. The port was then positioned in the pocket. The port was then accessed. Good return of venous blood was noted and flushed easily. Wounds were inspected. Good hemostasis was noted. Following this, the wounds were then closed using interrupted 3-0 Vicryl and running 4-0 monocril sutures. The wounds were then cleaned, dried and dressed with surgical glue. All counts were correct at the end of the procedure. The patient tolerated the procedure well and was taken to the recovery room in stable condition. Portable chest radiograph ordered for PACU.

## 2021-10-23 NOTE — PROGRESS NOTES
Hematology Oncology Daily Progress Note    Admit Date: 10/20/2021  Hospital day a few    Subjective:     Patient has complaints of mild to moderate fatigue--denies sob/cp. Medication side effects: none    Scheduled Meds:   lisinopril  10 mg Oral Daily    allopurinol  300 mg Oral Daily    levETIRAcetam  1,000 mg Oral BID    dexamethasone  4 mg Oral 4 times per day    pantoprazole  40 mg Oral QAM AC    etoposide (VEPESID) chemo IVPB  220 mg IntraVENous Q24H    CARBOplatin (PARAPLATIN) chemo IVPB (by AUC)  630 mg IntraVENous Once    ondansetron  24 mg Oral Once    sodium chloride flush  5-40 mL IntraVENous 2 times per day    enoxaparin  40 mg SubCUTAneous Daily    levothyroxine  100 mcg Oral Daily     Continuous Infusions:   sodium chloride 100 mL/hr at 10/23/21 1203    sodium chloride 10 mL/hr at 10/22/21 0442     PRN Meds:[START ON 10/24/2021] ondansetron **OR** [START ON 10/24/2021] ondansetron, sodium chloride flush, sodium chloride, polyethylene glycol, acetaminophen **OR** acetaminophen    Review of Systems  Pertinent items are noted in HPI. REVIEW OF SYSTEMS:         · Constitutional: Denies fever, sweats, weight loss     · Eyes: No visual changes or diplopia. No scleral icterus. · ENT: No Headaches, hearing loss or vertigo. No mouth sores or sore throat. · Cardiovascular: No chest pain, dyspnea on exertion, palpitations or loss of consciousness. · Respiratory: No cough or wheezing, no sputum production. No hemoptysis. .    · Gastrointestinal: No abdominal pain, appetite loss, blood in stools. No change in bowel habits. · Genitourinary: No dysuria, trouble voiding, or hematuria. · Musculoskeletal:  Generalized weakness. No joint complaints. · Integumentary: No rash or pruritis. · Neurological: No headache, diplopia. No change in gait, balance, or coordination. No paresthesias. · Endocrine: No temperature intolerance. No excessive thirst, fluid intake, or urination. · Hematologic/Lymphatic: No abnormal bruising or ecchymoses, blood clots or swollen lymph nodes. · Allergic/Immunologic: No nasal congestion or hives. ·     Objective:     Patient Vitals for the past 8 hrs:   BP Temp Temp src Pulse Resp SpO2 Height Weight   10/23/21 1152 (!) 145/93 97.5 °F (36.4 °C) Oral 51 18 97 % -- --   10/23/21 1041 136/85 97.9 °F (36.6 °C) Oral 55 18 94 % -- --   10/23/21 0819 (!) 149/93 97.7 °F (36.5 °C) Oral 57 18 96 % 6' 2\" (1.88 m) 212 lb 8 oz (96.4 kg)     I/O last 3 completed shifts:   In: 1225.4 [I.V.:1225.4]  Out: 1400 [Urine:1400]  I/O this shift:  In: -   Out: 700 [Urine:700]    BP (!) 145/93   Pulse 51   Temp 97.5 °F (36.4 °C) (Oral)   Resp 18   Ht 6' 2\" (1.88 m)   Wt 212 lb 8 oz (96.4 kg)   SpO2 97%   BMI 27.28 kg/m²     General Appearance:    Alert, cooperative, no distress, appears stated age   Head:    Normocephalic, without obvious abnormality, atraumatic   Eyes:    PERRL, conjunctiva/corneas clear, EOM's intact, fundi     benign, both eyes        Ears:    Normal TM's and external ear canals, both ears   Nose:   Nares normal, septum midline, mucosa normal, no drainage    or sinus tenderness   Throat:   Lips, mucosa, and tongue normal; teeth and gums normal   Neck:   Supple, symmetrical, trachea midline, no adenopathy;        thyroid:  No enlargement/tenderness/nodules; no carotid    bruit or JVD   Back:     Symmetric, no curvature, ROM normal, no CVA tenderness   Lungs:     Clear to auscultation bilaterally, respirations unlabored   Chest wall:    No tenderness or deformity   Heart:    Regular rate and rhythm, S1 and S2 normal, no murmur, rub   or gallop   Abdomen:     Soft, non-tender, bowel sounds active all four quadrants,     no masses, no organomegaly           Extremities:   Extremities normal, atraumatic, no cyanosis or edema   Pulses:   2+ and symmetric all extremities   Skin:   Skin color, texture, turgor normal, no rashes or lesions   Lymph nodes: Cervical, supraclavicular, and axillary nodes normal   Neurologic:   CNII-XII intact. Normal strength, sensation and reflexes       throughout         Data Review  CBC:   Lab Results   Component Value Date    WBC 10.4 10/23/2021    RBC 4.44 10/23/2021       Assessment:     Active Problems:    Intracranial mass    Lesion of lung    First time seizure ([facility] [address]    Hypothyroid  Resolved Problems:    * No resolved hospital problems. *      Plan:     1. Extensive stage SCLC. Will start Carbo/-16 while he is here. Will add in Tecentriq with cycle 2. I reviewed prognosis--1 to 2 months without chemo. 9 to 12 months median survival with chemo. I explained the potential side effects of treatment including nausea, vomiting, neuropathy that can be permanent and debilitating, hair loss, renal failure, life threatening infections, sudden death, electrolyte abnormalities, seizures, etc.  The patient  understands this and is willing to proceed.         Electronically signed by Geeta Donato MD on 10/23/2021 at 12:22 PM

## 2021-10-23 NOTE — CONSULTS
last 2 months-he has become more angry which is unlike him. He does have a history of smoking but has not smoked tobacco in 25 years, he does currently smoke marijuana. Patient was transferred to [place] [id number] for neurosurgical evaluation of multiple brain lesions. ROS:   DELIA 2/2 lethargy/encephalopathy     No Known Allergies    Past Medical History:   Diagnosis Date    Arthritis     left shoulder    Diverticula of colon     Fx ankle     LEFT    Hypertension     Hypothyroid     Pneumonia 2021    Prostate enlargement     BENIGN    Seizure ([address] 10/19/2021        Past Surgical History:   Procedure Laterality Date    BRONCHOSCOPY N/A 10/21/2021    BRONCHOSCOPY ENDOBRONCHIAL ULTRASOUND performed by Clemente Kidd MD at [address] [facility]  10/21/2021    BRONCHOSCOPY BIOPSY BRONCHUS performed by Clemente Kidd MD at [address] [place] [address]  10/21/2021    BRONCHOSCOPY BRUSHINGS performed by Clemente Kidd MD at [address] [facility] Right [address] [place] [address]    THORACOTOMY  10/13/2021       Social History     Occupational History    Not on file   Tobacco Use    Smoking status: Former Smoker     Quit date: 10/21/2001     Years since quittin.0    Smokeless tobacco: Never Used   Vaping Use    Vaping Use: Never used   Substance and Sexual Activity    Alcohol use: Not Currently    Drug use: Yes     Frequency: 1.0 times per week     Types: Marijuana    Sexual activity: Not on file        History reviewed. No pertinent family history. No outpatient medications have been marked as taking for the 10/20/21 encounter [facility] Encounter).       Current Facility-Administered Medications   Medication Dose Route Frequency Provider Last Rate Last Admin    allopurinol (ZYLOPRIM) tablet 300 mg  300 mg Oral Daily Shree Carias MD   300 mg at 10/22/21 1408    levETIRAcetam (KEPPRA) tablet 1,000 mg  1,000 mg Oral BID Marily Caro MD       [employer] dexamethasone (DECADRON) tablet 4 mg  4 mg Oral 4 times per day Lizzy Razo MD        [START ON 10/23/2021] pantoprazole (PROTONIX) tablet 40 mg  40 mg Oral QAM AC Lizzy Razo MD        [START ON 10/23/2021] etoposide (VEPESID) 220 mg in sodium chloride 0.9 % 600 mL chemo IVPB  220 mg IntraVENous Q24H Clifm Homans, MD  Gearold Sandman ON 10/23/2021] CARBOplatin (PARAPLATIN) 630 mg in sodium chloride 0.9 % 100 mL chemo IVPB  630 mg IntraVENous Once Clifm Homans, MD        [START ON 10/23/2021] ondansetron [facility]) tablet 24 mg  24 mg Oral Once Clifm Homans, MD        0.9 % sodium chloride infusion   IntraVENous Continuous Clifm Homans,  mL/hr at 10/22/21 1700 New Bag at 10/22/21 1700    [START ON 10/24/2021] ondansetron (ZOFRAN-ODT) disintegrating tablet 4 mg  4 mg Oral Q8H PRN Clifm Homans, MD        Or   [facility] [START ON 10/24/2021] ondansetron [facility]) injection 4 mg  4 mg IntraVENous Q6H PRN Clifm Homans, MD  Gearold Sandman ON 10/23/2021] ceFAZolin (ANCEF) 2000 mg in dextrose 5 % 50 mL IVPB  2,000 mg IntraVENous On Call to [address], DO        sodium chloride flush 0.9 % injection 5-40 mL  5-40 mL IntraVENous 2 times per day Pato Do, DO   10 mL at 10/22/21 0162    sodium chloride flush 0.9 % injection 5-40 mL  5-40 mL IntraVENous PRN Pato Do, DO        0.9 % sodium chloride infusion  25 mL IntraVENous PRN Pato Do, DO 10 mL/hr at 10/22/21 0442 Rate Verify at 10/22/21 0442    enoxaparin (LOVENOX) injection 40 mg  40 mg SubCUTAneous Daily Pato Do, DO   40 mg at 10/22/21 0811    polyethylene glycol (GLYCOLAX) packet 17 g  17 g Oral Daily PRN Pato Do, DO        acetaminophen (TYLENOL) tablet 650 mg  650 mg Oral Q6H PRN Pato Do, DO   650 mg at 10/22/21 1120    Or    acetaminophen (TYLENOL) suppository 650 mg  650 mg Rectal Q6H PRN Pato Do, DO        levothyroxine (SYNTHROID) tablet 100 mcg  100 mcg Oral Daily Pato Do, DO   100 mcg at 10/22/21 0656      Objective:  BP (!) 144/90   Pulse 66   Temp 97.7 °F (36.5 °C) (Oral)   Resp 18   Wt 210 lb 15.7 oz (95.7 kg)   SpO2 95%   BMI 27.09 kg/m²     Physical Exam:  Patient seen and examined   General: Well developed. Eyes closed, in not acute distress. HENT: atraumatic, neck supple  Eyes: Optic discs: Not tested  Pulmonary: unlabored respiratory effort  Cardiovascular:  Bradycardic on monitor. Gastrointestinal: abdomen soft, NT, ND    Neurologic Exam:  Neurological:  Mental Status: remains lethargic, opens eyes briefly to voice, oriented to person/place, disoriented to time   Attention: poor  Language: No aphasia or dysarthria noted however mostly one word answers given to questions at this time  Sensation: Intact to all extremities to light touch    Cranial Nerves:  Cranial Nerves:  II: Visual acuity not tested, denies new visual changes / diplopia  III, IV, VI: PERRL, 5 mm bilaterally  VII: Face symmetric  IX: Palate movement equal bilaterally  XII: Tongue midline    Musculoskeletal:   Gait: Not tested   Assist devices: None   Tone: normal   Motor strength:    Right  Left    Right  Left    Deltoid  5 4  Hip Flex  5 4   Biceps  5 4  Knee Extensors  5 4   Triceps  5 4  Knee Flexors  5 4   Wrist Ext  5 4  Ankle Dorsiflex. 5 4+   Wrist Flex  5 4  Ankle Plantarflex. 5 4+   Handgrip  5 4+  Ext Amari Longus  5 4+   Thumb Ext  5 4+           Radiological Findings:    I personally reviewed the patient's imaging which consists of a CT head and MRI brain dated 10/20/2021. This demonstrates multiple enhancing brain lesions with surrounding vasogenic edema c/w metastases.      Labs  Recent Labs     10/20/21  0614 10/21/21  0451 10/22/21  0418    140 140    104 104   CO2 19* 24 24   BUN 24* 24* 26*   CREATININE 1.0 0.7* 0.8   GLUCOSE 185* 131* 151*   PHOS  --   --  3.1     Recent Labs     10/20/21  0614 10/21/21  0451 10/22/21  0418   WBC 12.0* 9.9 13.0*   RBC 4.90 4.68 4.69   INR  --  0.96  --        Toxicology screen: +cannabinoid     Patient Active Problem List    Diagnosis Date Noted    Lesion of lung 10/21/2021    First time seizure ([facility] [address] 10/21/2021    Hypothyroid     Intracranial mass 10/20/2021       Assessment:  74 yo male who presents today with seizure at home. MRI brain demonstrates approximately 18  metastatic lesions scattered throughout the brain. CT chest with evidence of large left infrahilar mass- concerning for primary lung carcinoma. Plan  1. No emergent neurosurgical intervention indicated, given size and number of brain metastases recommend treatment of brain lesions with radiation therapy   2. Discussed plan of care with ICU attending, plan for bronchoscopy/biopsy of lung mass for pathology   3. q 1 hour neuro checks  4. Cerebral edema:  -Decadron 4 mg q 6 hours, PPI/SSI  -Keep sodium WNL  -HOB elevated  -No dextrose in IVF or IV drips   5. Keppra 1000 mg BID for seizure prophylaxis- may need to involve neurology if any further seizure activity occurs  6. Oncology and radiation oncology consults placed  7. OK for DVT chemoprophylaxis from neurosurgical standpoint  8. Thank you for consult. We will follow peripherally while in house.  Please call with questions/change in neurologic exam.       Sofia Dean MD, PhD  [address], [address] [place], [place], [address] [phone] (c), [phone] (o)

## 2021-10-23 NOTE — PROGRESS NOTES
Patient arrived to PACU post RIGHT SUBCLAVIAN PORT A CATH INSERTION by Dr. Андрей Bettencourt. Patient is awake and alert on 2L NC. R chest incision site CD&I with surgical glue. Denies pain. Per report patient tolerated procedure w/o difficulty. X-ray called for post op X-ray.  Patient hooked up to PACU monitors VS stable, SB.

## 2021-10-23 NOTE — PROGRESS NOTES
Progress Note  Admit Date: 10/20/2021           73 y.o. male w/ PMH of HTN, prostate CA, hypothyroidism, left rotator cuff tendinitis, empyema of left lung who presented to [facility] on 10/20/2021 with confusion and seizure. Was admitted to the ICU. Interval History:   No  new symptoms     Awake and alert  He has no recollection of what happened PTA    Sitting OOB, wife at bedside. No chest pain  No overt seizures  No fevers      Review of Systems - Negative except as in HPI. Scheduled Medications:    allopurinol  300 mg Oral Daily    levETIRAcetam  1,000 mg Oral BID    dexamethasone  4 mg Oral 4 times per day    pantoprazole  40 mg Oral QAM AC    etoposide (VEPESID) chemo IVPB  220 mg IntraVENous Q24H    CARBOplatin (PARAPLATIN) chemo IVPB (by AUC)  630 mg IntraVENous Once    ondansetron  24 mg Oral Once    ceFAZolin  2,000 mg IntraVENous On Call to OR    sodium chloride flush  5-40 mL IntraVENous 2 times per day    enoxaparin  40 mg SubCUTAneous Daily    levothyroxine  100 mcg Oral Daily      PRN Medications: [START ON 10/24/2021] ondansetron **OR** [START ON 10/24/2021] ondansetron, sodium chloride flush, sodium chloride, polyethylene glycol, acetaminophen **OR** acetaminophen  Diet: Diet NPO    Continuous Infusions:   sodium chloride 100 mL/hr at 10/23/21 0544    sodium chloride 10 mL/hr at 10/22/21 0442       PHYSICAL EXAM:  BP (!) 149/93   Pulse 57   Temp 97.7 °F (36.5 °C) (Oral)   Resp 18   Wt 210 lb 15.7 oz (95.7 kg)   SpO2 96%   BMI 27.09 kg/m²   No results for input(s): POCGLU in the last 72 hours.     Intake/Output Summary (Last 24 hours) at 10/23/2021 0847  Last data filed at 10/23/2021 1028  Gross per 24 hour   Intake 1225.42 ml   Output 1800 ml   Net -574.58 ml       General appearance: alert, appears stated age and cooperative  Head: Normocephalic, without obvious abnormality, atraumatic  Neck: no adenopathy, no carotid bruit, no JVD, supple, symmetrical, trachea midline and thyroid not enlarged, symmetric, no tenderness/mass/nodules  Lungs: clear to auscultation bilaterally  Heart: regular rate and rhythm, S1, S2 normal, no murmur, click, rub or gallop  Abdomen: soft, non-tender; bowel sounds normal; no masses,  no organomegaly  Extremities: extremities normal, atraumatic, no cyanosis or edema  Pulses: 2+ and symmetric  Neurologic: Grossly normal      LABS:  Recent Labs     10/21/21  0451 10/22/21  0418 10/23/21  0409   WBC 9.9 13.0* 10.4   HGB 13.3* 13.3* 12.7*   HCT 40.5 40.5 39.0*    234 201                                                                    Recent Labs     10/21/21  0451 10/22/21  0418 10/23/21  0409    140 140   K 4.1 4.3 4.4    104 106   CO2 24 24 24   BUN 24* 26* 25*   CREATININE 0.7* 0.8 0.8   GLUCOSE 131* 151* 142*     No results for input(s): AST, ALT, ALB, BILITOT, ALKPHOS in the last 72 hours. No results for input(s): TROPONINI in the last 72 hours. No results for input(s): BNP in the last 72 hours. No results for input(s): CHOL, HDL in the last 72 hours. Invalid input(s): LDLCALCU  Recent Labs     10/21/21  0451 10/23/21  0351   INR 0.96 0.96       Assessment & Plan:    73 y.o. male transferred from [facility] on 10/20/2021 with confusion and seizure. Intracranial mass, Non traumatic with vasogenic edema  - Presented to the ED at [place] with mental status changes.  - Noted with encephalopathy and probable seizures from brain mass with vasogenic edema. Transferred here for further mgt  - Admitted into the ICU. Transferred from ICU 10/21/2021  - NSGY evaluated: No emergent neurosurgical intervention indicated, given size and number of brain metastases recommend treatment of brain lesions with radiation therapy   - Continue Keppra  - Continue Decadron  - Seen by Rad/onc:  Whole brain radiation therapy planned, to start once his pathology is back confirming malignancy.   Planned for [facility] office as he lives in [place]      Seizures  Lactic acidosis  - Seizure secondary to multiple intracranial metastases  - Lactic acidosis likely sec to seizures  LP (10/20) negative, CSF studies WNL. CSF cytology shows no malignancy  - Was seen in the ICU by Neurology   - Continue Decadron and Keppra  - No further seizures       Acute metabolic encephalopathy secondary to seizures, brain mass with cerebral edema  -Appears mental status change resolved. -Continue Decadron and Keppra      Lung mass, large left infrahilar mass   Pleural effusion, probably malignant effusion  Left adrenal mass  Multiple intracranial masses  - Metastatic disease, lung primary  - S/p Bronchoscopy with biopsy- Forcep biopsy of left lower lobe mass shows small cell lung cancer. FNA of mediastinal lymphadenopathy still pending but is likely small cell as well  Dr. Bernardo Acosta (Medical oncology) aware and will be starting chemotherapy as inpatient  - Surgery consulted for [facility]. Recent txt for Empyema of left pleural space sp L open decortication August 2021  Right LL airspace disease  - Initial concern for infection, post-obstructive pneumonia: ruled out  - WBC 12 on admission, Now WNL without antibx  - On room air with an oxygen saturation of 100%  - Blood cultures: NGTD  - Monitor vitals and labs      Afib RVR,  paroxysmal afib with subsequent Sinus Bradycardia  Afib RVR, then sinus bradycardia  - Was given atropine in ED  - NSR now  - Monitor electrolytes  - Telemetry      Bilateral anterolateral rib fractures  - Sec to fall during seizure episode: wife stated he fell off of the bed while agitated during seizure episode  - Pain control  - Incentive spirometry      HTN.  POA  - Resume home med: lisinopril      Hypothyroidism  -Continue home levothyroxine 100 mcg daily          The patient and / or the family were informed of the results of any tests, a time was given to answer questions, a plan was proposed and they agreed with plan.    DNR-CCA    Disposition:   Transferred from ICU 10/21/2021  Pending plans per onc with Path report: in pxt chemo planned possibly to start today after Port placement today  Will discuss with oncology: to transition care to Oncology if/when chemo starts  PT/OT lazarus Mendez MD

## 2021-10-23 NOTE — PROGRESS NOTES
Pt has chemotherapy scheduled this am and is awaiting port placement. Day nurse contacted surgery team and team stated \"port to be placed this afternoon after 5 cases are completed\". Chemotherapy is currently delayed until port placement. Day nurse messaged oncologist and is aware. No new orders. Will continue to monitor.

## 2021-10-23 NOTE — PLAN OF CARE
Problem: Pain:  Goal: Pain level will decrease  Description: Pain level will decrease  10/23/2021 0426 by Tony Xiao RN  Outcome: Ongoing  Note: No complaints of pain so far this shift. Problem: Falls - Risk of:  Goal: Will remain free from falls  Description: Will remain free from falls  10/23/2021 0426 by Tony Xiao RN  Outcome: Ongoing  Note: Orthostatic vital signs obtained at start of shift - see flowsheet for details. Pt does not meet criteria for orthostasis. Pt is a High fall risk. See Rose Laxmi Fall Score and ABCDS Injury Risk assessments. - Screening for Orthostasis AND not a [place] Risk per THORPE/ABCDS: Pt bed is in low position, side rails up, call light and belongings are in reach. Fall risk light is on outside pts room. Pt encouraged to call for assistance as needed. Will continue with hourly rounds for PO intake, pain needs, toileting and repositioning as needed. Problem: Skin Integrity:  Goal: Will show no infection signs and symptoms  Description: Will show no infection signs and symptoms  10/23/2021 0426 by Tony Xiao RN  Outcome: Ongoing  Note: Skin remains free of new signs/symptoms indicative of an infection.      Problem: Nutrition  Intervention: Swallowing evaluation  Note: Patient NPO since 0000

## 2021-10-24 LAB
ANION GAP SERPL CALCULATED.3IONS-SCNC: 10 MMOL/L (ref 3–16)
BLOOD CULTURE, ROUTINE: NORMAL
BUN BLDV-MCNC: 22 MG/DL (ref 7–20)
CALCIUM SERPL-MCNC: 8.3 MG/DL (ref 8.3–10.6)
CHLORIDE BLD-SCNC: 107 MMOL/L (ref 99–110)
CO2: 23 MMOL/L (ref 21–32)
CREAT SERPL-MCNC: 0.7 MG/DL (ref 0.8–1.3)
CULTURE, BLOOD 2: NORMAL
GFR AFRICAN AMERICAN: >60
GFR NON-AFRICAN AMERICAN: >60
GLUCOSE BLD-MCNC: 133 MG/DL (ref 70–99)
HCT VFR BLD CALC: 38.3 % (ref 40.5–52.5)
HEMOGLOBIN: 12.7 G/DL (ref 13.5–17.5)
MCH RBC QN AUTO: 28.5 PG (ref 26–34)
MCHC RBC AUTO-ENTMCNC: 33.2 G/DL (ref 31–36)
MCV RBC AUTO: 85.8 FL (ref 80–100)
PDW BLD-RTO: 15.8 % (ref 12.4–15.4)
PLATELET # BLD: 200 K/UL (ref 135–450)
PMV BLD AUTO: 9.2 FL (ref 5–10.5)
POTASSIUM REFLEX MAGNESIUM: 4.1 MMOL/L (ref 3.5–5.1)
RBC # BLD: 4.46 M/UL (ref 4.2–5.9)
SODIUM BLD-SCNC: 140 MMOL/L (ref 136–145)
WBC # BLD: 10.3 K/UL (ref 4–11)

## 2021-10-24 PROCEDURE — 6360000002 HC RX W HCPCS: Performed by: STUDENT IN AN ORGANIZED HEALTH CARE EDUCATION/TRAINING PROGRAM

## 2021-10-24 PROCEDURE — 6360000002 HC RX W HCPCS: Performed by: INTERNAL MEDICINE

## 2021-10-24 PROCEDURE — 85027 COMPLETE CBC AUTOMATED: CPT

## 2021-10-24 PROCEDURE — 2060000000 HC ICU INTERMEDIATE R&B

## 2021-10-24 PROCEDURE — 96415 CHEMO IV INFUSION ADDL HR: CPT

## 2021-10-24 PROCEDURE — 6370000000 HC RX 637 (ALT 250 FOR IP): Performed by: STUDENT IN AN ORGANIZED HEALTH CARE EDUCATION/TRAINING PROGRAM

## 2021-10-24 PROCEDURE — 2580000003 HC RX 258: Performed by: STUDENT IN AN ORGANIZED HEALTH CARE EDUCATION/TRAINING PROGRAM

## 2021-10-24 PROCEDURE — 84443 ASSAY THYROID STIM HORMONE: CPT

## 2021-10-24 PROCEDURE — 6370000000 HC RX 637 (ALT 250 FOR IP): Performed by: INTERNAL MEDICINE

## 2021-10-24 PROCEDURE — 2580000003 HC RX 258: Performed by: INTERNAL MEDICINE

## 2021-10-24 PROCEDURE — 36591 DRAW BLOOD OFF VENOUS DEVICE: CPT

## 2021-10-24 PROCEDURE — 80048 BASIC METABOLIC PNL TOTAL CA: CPT

## 2021-10-24 PROCEDURE — 96413 CHEMO IV INFUSION 1 HR: CPT

## 2021-10-24 RX ADMIN — ALLOPURINOL 300 MG: 300 TABLET ORAL at 09:28

## 2021-10-24 RX ADMIN — ACETAMINOPHEN 650 MG: 325 TABLET ORAL at 20:48

## 2021-10-24 RX ADMIN — LEVETIRACETAM 1000 MG: 500 TABLET, FILM COATED ORAL at 09:27

## 2021-10-24 RX ADMIN — DEXAMETHASONE 4 MG: 4 TABLET ORAL at 00:20

## 2021-10-24 RX ADMIN — LISINOPRIL 10 MG: 10 TABLET ORAL at 09:27

## 2021-10-24 RX ADMIN — DEXAMETHASONE 4 MG: 4 TABLET ORAL at 06:37

## 2021-10-24 RX ADMIN — LEVOTHYROXINE SODIUM 100 MCG: 0.1 TABLET ORAL at 06:37

## 2021-10-24 RX ADMIN — DEXAMETHASONE 4 MG: 4 TABLET ORAL at 11:55

## 2021-10-24 RX ADMIN — DEXAMETHASONE 4 MG: 4 TABLET ORAL at 18:36

## 2021-10-24 RX ADMIN — CARBOPLATIN 630 MG: 10 INJECTION, SOLUTION INTRAVENOUS at 00:21

## 2021-10-24 RX ADMIN — SODIUM CHLORIDE: 9 INJECTION, SOLUTION INTRAVENOUS at 11:55

## 2021-10-24 RX ADMIN — SODIUM CHLORIDE, PRESERVATIVE FREE 10 ML: 5 INJECTION INTRAVENOUS at 10:07

## 2021-10-24 RX ADMIN — ETOPOSIDE 220 MG: 20 INJECTION INTRAVENOUS at 21:05

## 2021-10-24 RX ADMIN — DEXAMETHASONE 4 MG: 4 TABLET ORAL at 23:37

## 2021-10-24 RX ADMIN — SODIUM CHLORIDE, PRESERVATIVE FREE 10 ML: 5 INJECTION INTRAVENOUS at 20:42

## 2021-10-24 RX ADMIN — ACETAMINOPHEN 650 MG: 325 TABLET ORAL at 14:02

## 2021-10-24 RX ADMIN — PANTOPRAZOLE SODIUM 40 MG: 40 TABLET, DELAYED RELEASE ORAL at 06:37

## 2021-10-24 RX ADMIN — LEVETIRACETAM 1000 MG: 500 TABLET, FILM COATED ORAL at 20:42

## 2021-10-24 RX ADMIN — ENOXAPARIN SODIUM 40 MG: 100 INJECTION SUBCUTANEOUS at 09:28

## 2021-10-24 RX ADMIN — ONDANSETRON 4 MG: 4 TABLET, ORALLY DISINTEGRATING ORAL at 20:42

## 2021-10-24 ASSESSMENT — PAIN SCALES - GENERAL
PAINLEVEL_OUTOF10: 0
PAINLEVEL_OUTOF10: 0
PAINLEVEL_OUTOF10: 3
PAINLEVEL_OUTOF10: 2
PAINLEVEL_OUTOF10: 0
PAINLEVEL_OUTOF10: 1

## 2021-10-24 ASSESSMENT — PAIN DESCRIPTION - ORIENTATION
ORIENTATION: LEFT
ORIENTATION: LEFT

## 2021-10-24 ASSESSMENT — PAIN DESCRIPTION - LOCATION
LOCATION: SHOULDER
LOCATION: SHOULDER

## 2021-10-24 ASSESSMENT — PAIN DESCRIPTION - ONSET: ONSET: PROGRESSIVE

## 2021-10-24 ASSESSMENT — PAIN - FUNCTIONAL ASSESSMENT: PAIN_FUNCTIONAL_ASSESSMENT: PREVENTS OR INTERFERES SOME ACTIVE ACTIVITIES AND ADLS

## 2021-10-24 ASSESSMENT — PAIN DESCRIPTION - DESCRIPTORS: DESCRIPTORS: STABBING

## 2021-10-24 ASSESSMENT — PAIN DESCRIPTION - FREQUENCY: FREQUENCY: INTERMITTENT

## 2021-10-24 ASSESSMENT — PAIN DESCRIPTION - PAIN TYPE
TYPE: CHRONIC PAIN
TYPE: CHRONIC PAIN

## 2021-10-24 ASSESSMENT — PAIN DESCRIPTION - PROGRESSION: CLINICAL_PROGRESSION: NOT CHANGED

## 2021-10-24 NOTE — PROGRESS NOTES
Hematology Oncology Daily Progress Note    Admit Date: 10/20/2021  Hospital day several    Subjective:     Patient has complaints of mild fatigue--denies HAs/nausea/sob/cp. Medication side effects: none    Scheduled Meds:   lisinopril  10 mg Oral Daily    etoposide (VEPESID) chemo IVPB  220 mg IntraVENous Q24H    allopurinol  300 mg Oral Daily    levETIRAcetam  1,000 mg Oral BID    dexamethasone  4 mg Oral 4 times per day    pantoprazole  40 mg Oral QAM AC    sodium chloride flush  5-40 mL IntraVENous 2 times per day    enoxaparin  40 mg SubCUTAneous Daily    levothyroxine  100 mcg Oral Daily     Continuous Infusions:   sodium chloride 100 mL/hr at 10/24/21 0645    sodium chloride 10 mL/hr at 10/22/21 0442     PRN Meds:ondansetron **OR** ondansetron, sodium chloride flush, sodium chloride, polyethylene glycol, acetaminophen **OR** acetaminophen    Review of Systems  Pertinent items are noted in HPI. REVIEW OF SYSTEMS:         · Constitutional: Denies fever, sweats, weight loss     · Eyes: No visual changes or diplopia. No scleral icterus. · ENT: No Headaches, hearing loss or vertigo. No mouth sores or sore throat. · Cardiovascular: No chest pain, dyspnea on exertion, palpitations or loss of consciousness. · Respiratory: No cough or wheezing, no sputum production. No hemoptysis. .    · Gastrointestinal: No abdominal pain, appetite loss, blood in stools. No change in bowel habits. · Genitourinary: No dysuria, trouble voiding, or hematuria. · Musculoskeletal:  Generalized weakness. No joint complaints. · Integumentary: No rash or pruritis. · Neurological: No headache, diplopia. No change in gait, balance, or coordination. No paresthesias. · Endocrine: No temperature intolerance. No excessive thirst, fluid intake, or urination. · Hematologic/Lymphatic: No abnormal bruising or ecchymoses, blood clots or swollen lymph nodes. · Allergic/Immunologic: No nasal congestion or hives.    ·     Objective: Patient Vitals for the past 8 hrs:   BP Temp Temp src Pulse Resp SpO2   10/24/21 0920 (!) 142/87 98 °F (36.7 °C) Oral 60 18 94 %   10/24/21 0345 (!) 147/95 97.5 °F (36.4 °C) Oral 64 18 94 %     I/O last 3 completed shifts: In: 5105.4 [P.O.:820; I.V.:3416.6; IV Piggyback:868.9]  Out: 2535 [Urine:2525; Blood:10]  I/O this shift:  In: 240 [P.O.:240]  Out: 500 [Urine:500]    BP (!) 142/87   Pulse 60   Temp 98 °F (36.7 °C) (Oral)   Resp 18   Ht 6' 2\" (1.88 m)   Wt 212 lb 8 oz (96.4 kg)   SpO2 94%   BMI 27.28 kg/m²     General Appearance:    Alert, cooperative, no distress, appears stated age   Head:    Normocephalic, without obvious abnormality, atraumatic   Eyes:    PERRL, conjunctiva/corneas clear, EOM's intact, fundi     benign, both eyes        Ears:    Normal TM's and external ear canals, both ears   Nose:   Nares normal, septum midline, mucosa normal, no drainage    or sinus tenderness   Throat:   Lips, mucosa, and tongue normal; teeth and gums normal   Neck:   Supple, symmetrical, trachea midline, no adenopathy;        thyroid:  No enlargement/tenderness/nodules; no carotid    bruit or JVD   Back:     Symmetric, no curvature, ROM normal, no CVA tenderness   Lungs:     Clear to auscultation bilaterally, respirations unlabored   Chest wall:    No tenderness or deformity   Heart:    Regular rate and rhythm, S1 and S2 normal, no murmur, rub   or gallop   Abdomen:     Soft, non-tender, bowel sounds active all four quadrants,     no masses, no organomegaly           Extremities:   Extremities normal, atraumatic, no cyanosis or edema   Pulses:   2+ and symmetric all extremities   Skin:   Skin color, texture, turgor normal, no rashes or lesions   Lymph nodes:   Cervical, supraclavicular, and axillary nodes normal   Neurologic:   CNII-XII intact.  Normal strength, sensation and reflexes       throughout         Data Review  CBC:   Lab Results   Component Value Date    WBC 10.3 10/24/2021    RBC 4.46 10/24/2021 Assessment:     Active Problems:    Intracranial mass    Lesion of lung    First time seizure ([address]    Hypothyroid  Resolved Problems:    * No resolved hospital problems. *      Plan:     1. Extensive stage SCLC. Day 2 of 3 of chemo (Carbo/-16). Will start whole brain radiation this week. He is tolerating chemo well. Will add in Tecentriq with cycle 2. Dr. Eileen Mahan will need to arrange Neulasta for this week as outpt.     2. ID--AF        Electronically signed by Josh Garcia MD on 10/24/2021 at 10:48 AM

## 2021-10-24 NOTE — PLAN OF CARE
Problem: Pain:  Goal: Pain level will decrease  Description: Pain level will decrease    Problem: Skin Integrity:  Goal: Will show no infection signs and symptoms  Description: Will show no infection signs and symptoms  10/24/2021 0456 by Brandi Zelaya RN  Outcome: Ongoing  Note: Patient remains free of signs and symptoms indicative of a new infection. Outcome: Ongoing  Note: No complaints of pain so far this shift. Problem: Falls - Risk of:  Goal: Will remain free from falls  Description: Will remain free from falls  10/24/2021 1838 by Caryl Díaz RN  Outcome: Ongoing  Note: Orthostatic vital signs obtained at start of shift - see flowsheet for details. Pt does not meet criteria for orthostasis. Pt is a Med fall risk. See Esta Blazing Fall Score and ABCDS Injury Risk assessments. - Screening for Orthostasis AND not a [place] Risk per THORPE/ABCDS: Pt bed is in low position, side rails up, call light and belongings are in reach. Fall risk light is on outside pts room. Pt encouraged to call for assistance as needed. Will continue with hourly rounds for PO intake, pain needs, toileting and repositioning as needed.

## 2021-10-24 NOTE — PROGRESS NOTES
PACU Transfer Note    Vitals:    10/23/21 2000   BP: (!) 143/90   Pulse: 55   Resp: 18   Temp: 96.9 °F (36.1 °C)   SpO2: 100% RA       In: 1602 [P.O.:240; I.V.:1362]  Out: 535 [Urine:525]    Pain assessment:  none  Pain Level: 0    Report given to Receiving unit RN. Patient meets lee ann discharge criteria. Patient transported back to [facility] via house transport.      10/23/2021 8:18 PM

## 2021-10-24 NOTE — PROGRESS NOTES
General Surgery  Post-operative Note      Procedure(s) Performed: Right subclavian tunneled single lumen Port-A-Cath insertion with fluoroscopic guidance    Subjective:   Patient's pain is controlled, denies nausea or vomiting. Tolerating diet. OOB, ambulating and voiding appropriately. Objective:  Anesthesia type: General      I/O    Intra op    Post op     Fluids  100 mL  100mL     EBL 10 mL 0 mL     Urine 0 mL 525 mL     Vitals:   Vitals:    10/23/21 2142 10/23/21 2148 10/23/21 2157 10/23/21 2203   BP: (!) 150/93 (!) 150/93  (!) 150/95   Pulse:  51     Resp:       Temp:       TempSrc:       SpO2: 97% 95% 98% 97%   Weight:       Height:           Physical Exam:  Post-op vital signs:  Stable   General appearance: alert, no acute distress, grooming appropriate  Eyes: No scleral icterus, EOM grossly intact  Neck: trachea midline, no JVD, no lymphadenopathy, neck supple  Chest/Lungs: CTAB, no crackles/rales, wheezes/rhonchi, normal effort with no accessory muscle use on RA; left chest with subclavian port in place, dressing c/d/i no strike through, no signs of bleeding or hematoma  Cardiovascular: RRR, no murmurs/gallops/rubs, S1 and S2 noted, well perfused  Abdomen: Soft, non-tender, non-distended, no rebound, guarding, or rigidity. Skin: warm and dry, no rashes  Extremities: no edema, no cyanosis  Genitourinary: Grossly normal  Neuro: A&Ox3, no focal deficits, sensation intact    Assessment and Plan  This is a 73y.o. year old male status post Right subclavian tunneled single lumen Port-A-Cath insertion secondary to small cell lung cancer. POD0.     Pain management: PRN tylenol  Cardiovasc: hemodynamically stable, will continue to monitor  Respiratory:  IS ordered to bedside, encourage hourly IS and deep breathing, wean oxygen as tolerated  Fluids:  [email or web address], Diet: General diet  : Urine output is adequate   Ambulation: OOB to chair, encourage ambulation  Prophylaxis: SCDs, lovenox  Antibiotics: none  Wound: Local wound care      Lashaun Goldstein MD  PGY1, General Surgery  10/23/21  10:25 PM  [phone]

## 2021-10-24 NOTE — PROGRESS NOTES
Progress Note  Admit Date: 10/20/2021           73 y.o. male with HTN, prostate CA, hypothyroidism, left rotator cuff tendinitis, hx of empyema of left lung who presented to [facility] on 10/20/2021 with confusion and seizure. Was transferred here following findings of intracranial masses on CT. Was admitted to the ICU. Transferred from ICU 10/21-22//2021      Interval History:   No  new symptoms     Awake and alert  He has no recollection of what happened PTA  No further seizures: seizure-free since admit    Sitting OOB, wife at bedside. Has started chemo: Day 2 of 3 of chemo   Tolerating    No n/v  No fevers  No cough  No chest pain    Some left shoulder pain, from \"my rotator cuff\", chronic relieved by extra-strength tylenol. Review of Systems - Negative except as in HPI. Scheduled Medications:    lisinopril  10 mg Oral Daily    etoposide (VEPESID) chemo IVPB  220 mg IntraVENous Q24H    allopurinol  300 mg Oral Daily    levETIRAcetam  1,000 mg Oral BID    dexamethasone  4 mg Oral 4 times per day    pantoprazole  40 mg Oral QAM AC    sodium chloride flush  5-40 mL IntraVENous 2 times per day    enoxaparin  40 mg SubCUTAneous Daily    levothyroxine  100 mcg Oral Daily      PRN Medications: ondansetron **OR** ondansetron, sodium chloride flush, sodium chloride, polyethylene glycol, acetaminophen **OR** acetaminophen  Diet: ADULT DIET; Regular    Continuous Infusions:   sodium chloride 100 mL/hr at 10/24/21 0645    sodium chloride 10 mL/hr at 10/22/21 0442       PHYSICAL EXAM:  BP (!) 142/87   Pulse 60   Temp 98 °F (36.7 °C) (Oral)   Resp 18   Ht 6' 2\" (1.88 m)   Wt 212 lb 8 oz (96.4 kg)   SpO2 94%   BMI 27.28 kg/m²   No results for input(s): POCGLU in the last 72 hours.     Intake/Output Summary (Last 24 hours) at 10/24/2021 0953  Last data filed at 10/24/2021 0926  Gross per 24 hour   Intake 5245.41 ml   Output 2635 ml   Net 2610.41 ml       General appearance: alert, appears stated age and cooperative  Head: Normocephalic, without obvious abnormality, atraumatic  Neck: no adenopathy, no carotid bruit, no JVD, supple, symmetrical, trachea midline and thyroid not enlarged, symmetric, no tenderness/mass/nodules  Lungs: clear to auscultation bilaterally  Heart: regular rate and rhythm, S1, S2 normal, no murmur, click, rub or gallop  Abdomen: soft, non-tender; bowel sounds normal; no masses,  no organomegaly  Extremities: extremities normal, atraumatic, no cyanosis or edema  Pulses: 2+ and symmetric  Neurologic: Grossly normal      LABS:  Recent Labs     10/22/21  0418 10/23/21  0409 10/24/21  0425   WBC 13.0* 10.4 10.3   HGB 13.3* 12.7* 12.7*   HCT 40.5 39.0* 38.3*    201 200                                                                    Recent Labs     10/22/21  0418 10/23/21  0409 10/24/21  0425    140 140   K 4.3 4.4 4.1    106 107   CO2 24 24 23   BUN 26* 25* 22*   CREATININE 0.8 0.8 0.7*   GLUCOSE 151* 142* 133*     No results for input(s): AST, ALT, ALB, BILITOT, ALKPHOS in the last 72 hours. No results for input(s): TROPONINI in the last 72 hours. No results for input(s): BNP in the last 72 hours. No results for input(s): CHOL, HDL in the last 72 hours. Invalid input(s): LDLCALCU  Recent Labs     10/23/21  0351   INR 0.96       Assessment & Plan:    73 y.o. male transferred from [facility] on 10/20/2021 with confusion and seizure. Intracranial mass, Non traumatic with vasogenic edema sec to Extensive stage SCLC: POA  - Presented to the ED at [place] with mental status changes.  - Noted with encephalopathy and probable seizures from brain mass with vasogenic edema. Transferred here for further mgt  - Admitted into the ICU.  Transferred from ICU 10/21- 22/2021  - NSGY evaluated: No emergent neurosurgical intervention indicated, given size and number of brain metastases: recommend treatment of brain lesions with radiation therapy   - Continue Keppra  - Continue Decadron: schedule per rad/onc  - Seen by Rad/onc:  Whole brain radiation therapy planned, to start this week. Planned for [facility] office as he lives in [facility]      Seizures  Lactic acidosis  - Seizure secondary to multiple intracranial metastases  - Lactic acidosis likely sec to seizures  LP (10/20) negative, CSF studies WNL. CSF cytology shows no malignancy  - Was seen in the ICU by Neurology   - No further seizures  - Continue Decadron  - Continue Keppra       Acute metabolic encephalopathy secondary to seizures, brain mass with cerebral edema  -Mental status change: resolved. -Continue Decadron and Keppra      Extensive stage SCLC POA, with  Lung mass, large left infrahilar mass   Pleural effusion, probably malignant effusion  Left adrenal mass  Multiple intracranial masses  - S/p Bronchoscopy with biopsy- Forcep biopsy of left lower lobe mass shows small cell lung cancer.   - Metastatic disease, lung primary, Extensive stage  - S/p Port placement. Day 2 of 3 of chemo       Recent txt for Empyema of left pleural space sp L open decortication August 2021  Right LL airspace disease  - Initial concern for infection, post-obstructive pneumonia: ruled out  - WBC 12 on admission, Now WNL without antibx  - Blood cultures: NGTD  - On room air with an oxygen saturation of 100%  - Monitor vitals and labs      Afib RVR,  paroxysmal afib with subsequent Sinus Bradycardia  - In the ED, was Afib RVR, then sinus bradycardia ? After med for RVR) ; required atropine in ED  - His lung cancer will predispose him to recurrent Afib  - NSR now  - Update TSH and echocardiogram  - CHAVASC score: 2  - He has been in NSR, but remains high risk for recurrent Afib  - Will add low dose metoprolol to his txt  - May need to consider [address] with [email or web address] if no C/I; ish in view of his intracranial masses.   - Will have Cardiology see him for new Afib  - Monitor electrolytes  - Telemetry      Bilateral anterolateral rib fractures  - Sec to fall during seizure episode: wife stated he fell off of the bed while agitated during seizure episode  - Pain control  - Incentive spirometry      HTN. POA  - Resumed home med: lisinopril  - Adding BB for Afib  - Monitor BPs      Hypothyroidism  - Update TSH  -Continue home levothyroxine 100 mcg daily          The patient and / or the family were informed of the results of any tests, a time was given to answer questions, a plan was proposed and they agreed with plan. Full Code    Disposition:   Transferred from ICU 10/21-10/22/2021  Initiated chemo: Day 2 of 3 of chemo   - Seen by Rad/onc:  Whole brain radiation therapy planned, to start this week. Planned for [address] office as he lives in [facility]. - Cardiology eval/Echo tomorrow.   - PT/OT eval- Patient is from home with wife. Likely home at discharge.   - Likely can DC within 1-2 days (Possibly tomorrow after 3rd round of chemo) if ok with Oncology    Caroline Chahal MD

## 2021-10-24 NOTE — ONCOLOGY
Original chemotherapy orders reviewed and acknowledged. Appropriateness of chemotherapy treatment regimen Etoposide & Carboplatin for diagnosis of Small cell lung cancerwas verified. Patient educated on chemotherapy regimen. Acknowledgement of informed consent for chemotherapy obtained. Estimated body surface area is 2.24 meters squared as calculated from the following:    Height as of this encounter: 6' 2\" (1.88 m). Weight as of this encounter: 212 lb 8 oz (96.4 kg). verified. Appropriate dosing calculations of chemotherapy based on above height, weight, and BSA verified. Administration: Chemotherapy drug Etoposide & Carboplatin  independently verified with Meir Woods RN prior to administration. Acknowledgement of informed consent for chemotherapy administration verified. Original order, appropriateness of regimen, drug supplied, height, weight, BSA, dose calculations, expiration dates/times, drug appearance, and two patient identifiers were verified by both RNs. Drug checked for vesicant/irritant status and for risk of hypersensitivity. Most recent laboratory values and allergies, were reviewed. Positive, brisk blood return via CVC was confirmed prior to administration. Chest x-ray for correct line placement reviewed. Tim Dubose RN and Meir Woods RN verified correct rate of chemotherapy and maintenance IV fluids. Patient was educated on chemotherapy regimen prior to administration including indication for treatment related to disease & side effects of chemotherapy drug. Patient verbalizes understanding of all instructions. Completion of Chemotherapy: Monitoring during infusion done per policy, see Flowsheets. Blood return verified before, during, and after infusion per policy; no signs of extravasation. Pt tolerating chemotherapy well and without incident. Chemotherapy infusion end time on the [facility] Will continue to monitor.

## 2021-10-25 LAB
ANION GAP SERPL CALCULATED.3IONS-SCNC: 9 MMOL/L (ref 3–16)
BLOOD CULTURE, ROUTINE: NORMAL
BUN BLDV-MCNC: 25 MG/DL (ref 7–20)
CALCIUM SERPL-MCNC: 8.3 MG/DL (ref 8.3–10.6)
CHLORIDE BLD-SCNC: 104 MMOL/L (ref 99–110)
CO2: 22 MMOL/L (ref 21–32)
CREAT SERPL-MCNC: 0.8 MG/DL (ref 0.8–1.3)
CULTURE, BLOOD 2: NORMAL
GFR AFRICAN AMERICAN: >60
GFR NON-AFRICAN AMERICAN: >60
GLUCOSE BLD-MCNC: 151 MG/DL (ref 70–99)
HCT VFR BLD CALC: 38.3 % (ref 40.5–52.5)
HEMOGLOBIN: 12.3 G/DL (ref 13.5–17.5)
LV EF: 50 %
LVEF MODALITY: NORMAL
MCH RBC QN AUTO: 28.2 PG (ref 26–34)
MCHC RBC AUTO-ENTMCNC: 32.2 G/DL (ref 31–36)
MCV RBC AUTO: 87.5 FL (ref 80–100)
PDW BLD-RTO: 16.2 % (ref 12.4–15.4)
PLATELET # BLD: 185 K/UL (ref 135–450)
PMV BLD AUTO: 8.9 FL (ref 5–10.5)
POTASSIUM REFLEX MAGNESIUM: 4.2 MMOL/L (ref 3.5–5.1)
RBC # BLD: 4.38 M/UL (ref 4.2–5.9)
SODIUM BLD-SCNC: 135 MMOL/L (ref 136–145)
TSH REFLEX: 0.89 UIU/ML (ref 0.27–4.2)
WBC # BLD: 9.4 K/UL (ref 4–11)

## 2021-10-25 PROCEDURE — 6360000004 HC RX CONTRAST MEDICATION: Performed by: INTERNAL MEDICINE

## 2021-10-25 PROCEDURE — 6360000002 HC RX W HCPCS: Performed by: STUDENT IN AN ORGANIZED HEALTH CARE EDUCATION/TRAINING PROGRAM

## 2021-10-25 PROCEDURE — 2580000003 HC RX 258: Performed by: STUDENT IN AN ORGANIZED HEALTH CARE EDUCATION/TRAINING PROGRAM

## 2021-10-25 PROCEDURE — 80048 BASIC METABOLIC PNL TOTAL CA: CPT

## 2021-10-25 PROCEDURE — 99223 1ST HOSP IP/OBS HIGH 75: CPT | Performed by: INTERNAL MEDICINE

## 2021-10-25 PROCEDURE — 2060000000 HC ICU INTERMEDIATE R&B

## 2021-10-25 PROCEDURE — 6370000000 HC RX 637 (ALT 250 FOR IP): Performed by: STUDENT IN AN ORGANIZED HEALTH CARE EDUCATION/TRAINING PROGRAM

## 2021-10-25 PROCEDURE — 92526 ORAL FUNCTION THERAPY: CPT

## 2021-10-25 PROCEDURE — 36591 DRAW BLOOD OFF VENOUS DEVICE: CPT

## 2021-10-25 PROCEDURE — 85027 COMPLETE CBC AUTOMATED: CPT

## 2021-10-25 PROCEDURE — 6370000000 HC RX 637 (ALT 250 FOR IP): Performed by: INTERNAL MEDICINE

## 2021-10-25 PROCEDURE — C8929 TTE W OR WO FOL WCON,DOPPLER: HCPCS

## 2021-10-25 RX ADMIN — ACETAMINOPHEN 650 MG: 325 TABLET ORAL at 10:17

## 2021-10-25 RX ADMIN — SODIUM CHLORIDE, PRESERVATIVE FREE 10 ML: 5 INJECTION INTRAVENOUS at 20:46

## 2021-10-25 RX ADMIN — ALLOPURINOL 300 MG: 300 TABLET ORAL at 10:10

## 2021-10-25 RX ADMIN — SODIUM CHLORIDE: 9 INJECTION, SOLUTION INTRAVENOUS at 10:14

## 2021-10-25 RX ADMIN — ENOXAPARIN SODIUM 40 MG: 100 INJECTION SUBCUTANEOUS at 10:10

## 2021-10-25 RX ADMIN — DEXAMETHASONE 4 MG: 4 TABLET ORAL at 17:55

## 2021-10-25 RX ADMIN — METOPROLOL TARTRATE 25 MG: 25 TABLET, FILM COATED ORAL at 10:09

## 2021-10-25 RX ADMIN — LEVOTHYROXINE SODIUM 100 MCG: 0.1 TABLET ORAL at 06:06

## 2021-10-25 RX ADMIN — LEVETIRACETAM 1000 MG: 500 TABLET, FILM COATED ORAL at 20:46

## 2021-10-25 RX ADMIN — ETOPOSIDE 220 MG: 20 INJECTION INTRAVENOUS at 21:10

## 2021-10-25 RX ADMIN — LISINOPRIL 10 MG: 10 TABLET ORAL at 10:09

## 2021-10-25 RX ADMIN — DEXAMETHASONE 4 MG: 4 TABLET ORAL at 11:35

## 2021-10-25 RX ADMIN — PERFLUTREN 1.65 MG: 6.52 INJECTION, SUSPENSION INTRAVENOUS at 09:05

## 2021-10-25 RX ADMIN — DEXAMETHASONE 4 MG: 4 TABLET ORAL at 06:06

## 2021-10-25 RX ADMIN — ACETAMINOPHEN 650 MG: 325 TABLET ORAL at 19:16

## 2021-10-25 RX ADMIN — DEXAMETHASONE 4 MG: 4 TABLET ORAL at 23:59

## 2021-10-25 RX ADMIN — SODIUM CHLORIDE, PRESERVATIVE FREE 10 ML: 5 INJECTION INTRAVENOUS at 10:11

## 2021-10-25 RX ADMIN — LEVETIRACETAM 1000 MG: 500 TABLET, FILM COATED ORAL at 10:09

## 2021-10-25 RX ADMIN — PANTOPRAZOLE SODIUM 40 MG: 40 TABLET, DELAYED RELEASE ORAL at 06:06

## 2021-10-25 RX ADMIN — SODIUM CHLORIDE: 9 INJECTION, SOLUTION INTRAVENOUS at 21:05

## 2021-10-25 ASSESSMENT — PAIN DESCRIPTION - ONSET
ONSET: ON-GOING
ONSET: ON-GOING

## 2021-10-25 ASSESSMENT — ENCOUNTER SYMPTOMS
SHORTNESS OF BREATH: 0
ALLERGIC/IMMUNOLOGIC NEGATIVE: 1
GASTROINTESTINAL NEGATIVE: 1
EYES NEGATIVE: 1
COUGH: 1

## 2021-10-25 ASSESSMENT — PAIN SCALES - GENERAL
PAINLEVEL_OUTOF10: 0
PAINLEVEL_OUTOF10: 3
PAINLEVEL_OUTOF10: 0
PAINLEVEL_OUTOF10: 3

## 2021-10-25 ASSESSMENT — PAIN DESCRIPTION - LOCATION
LOCATION: SHOULDER
LOCATION: SHOULDER

## 2021-10-25 ASSESSMENT — PAIN DESCRIPTION - FREQUENCY
FREQUENCY: INTERMITTENT
FREQUENCY: INTERMITTENT

## 2021-10-25 ASSESSMENT — PAIN DESCRIPTION - ORIENTATION
ORIENTATION: LEFT
ORIENTATION: LEFT

## 2021-10-25 ASSESSMENT — PAIN - FUNCTIONAL ASSESSMENT
PAIN_FUNCTIONAL_ASSESSMENT: PREVENTS OR INTERFERES SOME ACTIVE ACTIVITIES AND ADLS
PAIN_FUNCTIONAL_ASSESSMENT: PREVENTS OR INTERFERES SOME ACTIVE ACTIVITIES AND ADLS

## 2021-10-25 ASSESSMENT — PAIN DESCRIPTION - PROGRESSION
CLINICAL_PROGRESSION: NOT CHANGED
CLINICAL_PROGRESSION: NOT CHANGED

## 2021-10-25 ASSESSMENT — PAIN DESCRIPTION - PAIN TYPE
TYPE: CHRONIC PAIN
TYPE: CHRONIC PAIN

## 2021-10-25 ASSESSMENT — PAIN DESCRIPTION - DESCRIPTORS
DESCRIPTORS: ACHING
DESCRIPTORS: ACHING

## 2021-10-25 NOTE — PROGRESS NOTES
Speech Language Pathology  Facility/Department: [address] CENTER  Dysphagia Daily Treatment Note/dc  Late entry for 820 a.m    NAME: Cari Martin  : 1950  MRN: [id number]    Patient Diagnosis(es):   Patient Active Problem List    Diagnosis Date Noted    Lesion of lung 10/21/2021    First time seizure ([address] 10/21/2021    Hypothyroid     Intracranial mass 10/20/2021     Allergies: No Known Allergies  Onset Date: 10/20/2021    CT chest 10/20/21  CHEST:   Mediastinum:       1. Mass surrounds the left hilum inferiorly.  This could obscure associated   infrahilar adenopathy.  No significant mediastinal or right hilar adenopathy   is seen otherwise. 2. The heart is mildly enlarged.  There is no pericardial effusion.  Coronary   calcification is seen primarily LAD. 3. The aorta and pulmonary vessels are unremarkable for age. Lungs/pleura:       1. A large mass is seen surrounding and extending inferior to the left hilum. This measures 6.4 x 4.4 cm.  Subtle mass protrudes into the left lower lobe   bronchus, sample series 4, image 63.  This is better seen on coronal image   82.  This could potentially represent mucoid material.  In addition there is   airspace disease peripherally into the lower lobe likely some degree of   postobstructive volume loss or pneumonia.  Pleural effusion is also present   possibly malignant. 2. There is patchy bilateral airspace disease elsewhere greatest involving   the right lower lobe posteriorly.  This could be atelectatic disease however   pneumonia or aspiration is also considered. 3. No pneumothorax.  This is in spite of rib fractures as described below.    Soft Tissues/Bones: Bilateral rib fractures are present.  Some of these are   better seen on sagittal projection due to the fracture being within the axial   plane.  This involves the anterolateral right 3rd and 4th ribs as well as the   left lateral 7th rib.          MRI brain 10/20/21  Multiple brain parenchymal metastases in the supratentorial and   infratentorial brain as discussed above.  There are approximately 18   metastatic lesions scattered throughout the brain.  The largest lesion is in   the right temporal lobe and measures 1.4 cm.  The 2nd largest lesion is a   right frontal parietal lesion measuring 1.2 cm near the midline.  There is   mild edema associated with many of the lesions without midline shift or   significant mass effect.         Previous MBS - none  Chart reviewed. Medical Diagnosis: Intracranial mass  Treatment Diagnosis: Dysphagia    BSE Impression (10/21/2021)-  RN states pt appropriate to see pt at this time. Pt alert, oriented, follows commands and answered questions appropriately. Oral structures grossly intact, no asymmetry noted. Pt able to cough and swallow on command, vocal quality strong and clear. Pt denies any speech or swallowing issues prior to admit. Speech is clear, no word finding/dysarthria noted, cognition appears grossly intact. Pt presented pt with puree, thin liquids via cup / straw, including 3 ounces of uninterrupted swallows, as well as a rosie cracker. Pt demonstrated no overt signs of aspiration: no coughing/throat clearing or change in vocal quality. Good labial seal with no anterior loss of liquids. Good swallow movement noted upon palpation of anterior neck. Pt exhibited no difficulty with mastication of cracker, no oral residue. No c/o globus sensation  After assessment, pt noted to exhibit coughing with expectoration of secretions. Pt reports this has been occurring since his lung surgery in August.  He states it occurs throughout the day, not associated with eating.    Recommend regular diet/thin liquids with close monitoring for s/s of aspiration and will monitor need for instrumental exam    MBS results (n/a)    Pain: none    Current Diet : regular solids/thin liquids    Treatment:  Pt seen bedside to address the following goals:  1-The patient will tolerate recommended diet without observed clinical signs of aspiration  10/22: Per RN and pt, he has been tolerating PO well. Received pt awake, alert, resting in bed, on room air. Assisted with repositioning head of bed upright; assessed tolerance thins via cup (pt completed sequential ~3 oz screen) with positive oral acceptance, positive swallow movement, good oral clearance, no overt signs of aspiration or penetration. Pt declined other PO, awaiting arrival of wife who was supposed to bring breakfast (she subsequently arrived at end of therapy session). Recommend continue current diet as tolerated. Cont goal  10/25; pt alert, oriented, following commands and answering questions appropriately, cognition appears intact, through informal screening. Pt denies any speech or cognitive issues. Pt denies any swallowing issues, no respiratory decline per chart review. Pt analyzed with water via cup and straw as well as solid texture. Pt demonstrated adequate mastication with solids, no oral residue/pocketing noted. No overt signs of aspiration, no cough/throat clear or change in voice related to PO. Goal met     2-The patient/caregiver will demonstrate understanding of compensatory strategies for improved swallowing safety  10/21: Educated pt and spouse to purpose of visit, s/s of aspiration, concern if aspiration occurs, rationale for diet recommendation/strategies to reduce risk for aspiration and instruction to notify staff if any signs emerge. Both stated comprehension and agreeable  Cont goal  10/22: Provided education to the patient/spouse re:  purpose of visit, swallow function, dysphagia/aspiration, aspiration signs, MBS recommendations, importance of upright position. Indicated comprehension.   Cont goal  10/25: reviewed s/s of aspiration, concern if aspiration occurs, rationale for diet recommendation/strategies to reduce risk for aspiration and instruction to notify staff if any signs emerge. Also discussed speech and cognition. Pt denies any changes, stating \"I feel normal.\"  Pt stated comprehension and agreeable to notify staff if any change in speech or swallowing emerge. Goal met    Patient/Family/Caregiver Education:  See above    Compensatory Strategies:  Upright position     Plan:    Diet recommendations:  Regular solids, thin liquids, meds PO; as tolerated. DC recommendation: no follow up indicated  Treatment: 15 minutes  D/W nursing, Jovani Mccarthy prior to visit  Needs met prior to leaving room, call button in reach. Ryann Pedroza M.S./[facility] #[id number]  Pg.  # [phone]

## 2021-10-25 NOTE — CONSULTS
[address]   Cardiology Consultation   Date: 10/25/2021  Admit Date:  10/20/2021  Reason for Consultation: New onset afib      Attestation  I have seen,interviewed and examined the patient with the resident. Pertinent medical data and imaging studies reviewed. Refer to the residents note for details of clinical findings  I agree with his assessment and plan with following addendum:    Isolated even of Afib  Now in Sinus rhythm  CHADSVasc 2  Intracranial mets  Consider Eliquis 5 mg po bid, if there are no further invasive workups / contraindications. Continue metoprolol    Rhina Almanzar MD   Cardiac Electrophysiology  [facility]  Office [phone]    Consult Requesting Physician: Filomena Orellana MD     No chief complaint on file. HPI: Teri Martinez is a 73 y.o. with hx of HTN, hypothyroid who presented with new seizure and found to have small cell lung cancer. On 10/20 patient was noted to be in afib on EKG. Since then patient has been noted to be in NSR. He has not been on tele since moving to [facility], was placed on this moring though. Patient seen sitting in his chair this morning. Denies any chest pain, shortness of breath, palpitations. Denies any other complaints.       Past Medical History:   Diagnosis Date    Arthritis     left shoulder    Diverticula of colon     Fx ankle     LEFT    Hypertension     Hypothyroid     Pneumonia 06/24/2021    Prostate enlargement     BENIGN    Seizure ([address] 10/19/2021        Past Surgical History:   Procedure Laterality Date    BRONCHOSCOPY N/A 10/21/2021    BRONCHOSCOPY ENDOBRONCHIAL ULTRASOUND performed by Everett Hernandez MD at [address]  10/21/2021    BRONCHOSCOPY BIOPSY BRONCHUS performed by Everett Hernandez MD at [address]  10/21/2021    BRONCHOSCOPY BRUSHINGS performed by Everett Hernandez MD at [address] [place] [address] [id number] [facility] PORT SURGERY N/A 10/23/2021    RIGHT SUBCLAVIAN PORT A CATH INSERTION WITH FLUOROSCOPY GUIDANCE [facility]  10/13/[facility]       No Known Allergies    Social History:  Reviewed. reports that he quit smoking about 20 years ago. He has never used smokeless tobacco. He reports previous alcohol use. He reports current drug use. Frequency: 1.00 time per week. Drug: Marijuana. Family History:  Reviewed. family history is not on file. No premature CAD. Review of System:  All other systems reviewed except for that noted above. Pertinent negatives and positives are:     · General: negative for fever, chills   · Ophthalmic ROS: negative for - eye pain or loss of vision  · ENT ROS: negative for - headaches, sore throat   · Respiratory: negative for - cough, sputum  · Cardiovascular: Reviewed in HPI  · Gastrointestinal: negative for - abdominal pain, diarrhea, N/V  · Hematology: negative for - bleeding, blood clots, bruising or jaundice  · Genito-Urinary:  negative for - Dysuria or incontinence  · Musculoskeletal: negative for - Joint swelling, muscle pain  · Neurological: negative for - confusion, dizziness, headaches   · Psychiatric: No anxiety, no depression. · Dermatological: negative for - rash    Physical Examination:  Vitals:    10/25/21 1330   BP:    Pulse: 55   Resp:    Temp:    SpO2:         Intake/Output Summary (Last 24 hours) at 10/25/2021 1337  Last data filed at 10/25/2021 1137  Gross per 24 hour   Intake 2861.61 ml   Output 4625 ml   Net -1763.39 ml     In: 2621.6 [P.O.:1460; I.V.:1161.6]  Out: 4025    Wt Readings from Last 3 Encounters:   10/25/21 214 lb 1.1 oz (97.1 kg)   10/20/21 210 lb (95.3 kg)     Temp  Av °F (36.7 °C)  Min: 97.7 °F (36.5 °C)  Max: 99.1 °F (37.3 °C)  Pulse  Av.9  Min: 50  Max: 82  BP  Min: 120/93  Max: 149/98  SpO2  Av %  Min: 94 %  Max: 99 %    · Telemetry: Sinus rhythm   · Constitutional: Alert.  Oriented to person, place, and time. No distress. · Head: Normocephalic and atraumatic. · Mouth/Throat: Lips appear moist. Oropharynx is clear and moist.  · Eyes: Conjunctivae normal. EOM are normal.   · Neck: Neck supple. No lymphadenopathy. No rigidity. No JVD present. · Cardiovascular: Normal rate, regular rhythm. Normal S1&S2. Carotid pulse 2+ bilaterally. · Pulmonary/Chest: Bilateral respiratory sounds present. No respiratory accessory muscle use. No wheezes, No rhonchi. · Abdominal: Soft. Normal bowel sounds present. No distension, No tenderness. No splenomegaly. No hernia. · Musculoskeletal: No tenderness. No edema    · Lymphadenopathy: Has no cervical adenopathy. · Neurological: Alert and oriented. Cranial nerve II-XII grossly intact, No gross deficit to touch. · Skin: Skin is warm and dry. No rash, lesions, ulcerations noted. · Psychiatric: No anxiety nor agitation. Labs:  Reviewed. Recent Labs     10/23/21  0409 10/24/21  0425 10/25/21  0330    140 135*   K 4.4 4.1 4.2    107 104   CO2 24 23 22   BUN 25* 22* 25*   CREATININE 0.8 0.7* 0.8     Recent Labs     10/23/21  0409 10/24/21  0425 10/25/21  0330   WBC 10.4 10.3 9.4   HGB 12.7* 12.7* 12.3*   HCT 39.0* 38.3* 38.3*   MCV 87.7 85.8 87.5    200 185     Lab Results   Component Value Date    CKTOTAL 106 10/20/2021    TROPONINI <0.01 10/20/2021     No results found for: BNP  Lab Results   Component Value Date    PROTIME 10.8 10/23/2021    PROTIME 10.8 10/21/2021    INR 0.96 10/23/2021    INR 0.96 10/21/2021     No results found for: CHOL, HDL, TRIG    Diagnostic and imaging results reviewed. ECG:   10/20 @ 0630: Afib RVR with incomplete RBBB. 10/20 @ 6545: Sinus bradycardia  Echo: 10/25/21: Summary   Left ventricular cavity size is normal with normal left ventricular wall   thickness. Overall left ventricular systolic function appears normal with an ejection   fraction of 50%.    Mild to moderate mitral regurgitation   Estimated pulmonary artery systolic pressure is at 39 mmHg assuming a right   atrial pressure of 3 mmHg. I independently reviewed the ECG and telemetry. Scheduled Meds:   metoprolol tartrate  25 mg Oral BID    lisinopril  10 mg Oral Daily    etoposide (VEPESID) chemo IVPB  220 mg IntraVENous Q24H    allopurinol  300 mg Oral Daily    levETIRAcetam  1,000 mg Oral BID    dexamethasone  4 mg Oral 4 times per day    pantoprazole  40 mg Oral QAM AC    sodium chloride flush  5-40 mL IntraVENous 2 times per day    enoxaparin  40 mg SubCUTAneous Daily    levothyroxine  100 mcg Oral Daily     Continuous Infusions:   sodium chloride 100 mL/hr at 10/25/21 1014    sodium chloride 10 mL/hr at 10/22/21 0442     PRN Meds:.perflutren lipid microspheres, ondansetron **OR** ondansetron, sodium chloride flush, sodium chloride, polyethylene glycol, acetaminophen **OR** acetaminophen     Assessment:   Patient Active Problem List    Diagnosis Date Noted    Lesion of lung 10/21/2021    [facility]    Hypothyroid     Intracranial mass 10/20/2021      Active Hospital Problems    Diagnosis Date Noted    Lesion of lung [R91.1] 10/21/2021    First time [facility] [R56.9] 10/21/2021    Hypothyroid [E03.9]     Intracranial mass [R90.0] 10/20/2021         Recommendation(s):    Afib  - Only noted on 1 EKG. Patient was not on tele since. Placed on tele this morning.  - Continue metoprolol  - Echo normal  - ChadsVasc of 2 (Age > 72, HTN); no AC for now, continue with ASA    Thank you for allowing me to participate in the care of Digna Abarca . If you have any questions/comments, please do not hesitate to contact us.     Patient will be discussed with attending Dr. Latosha Estrada MD  PGY-3    Stefani Kuhn MD   Cardiac Electrophysiology  [address] [phone]    For any EP related issues after 5 PM, contact [address] on call cardiology through [phone].

## 2021-10-25 NOTE — CARE COORDINATION
1:55 PM  Continue to monitor for possible needs at discharge. Patient is from home with his wife. He is independent at baseline. He is on cycle 1, day 3 of chemotherapy today. Anticipating no needs at discharge at this time.

## 2021-10-25 NOTE — PROGRESS NOTES
Pt returned from echo. Dr. Krish Calloway rounding and states pt should be on telemetry. Placing pt on tele monitor at this time.

## 2021-10-25 NOTE — ONCOLOGY
Administration: Chemotherapy drug Etoposide independently verified with Odilia Gastelum RN prior to administration. Acknowledgement of informed consent for chemotherapy administration verified. Original order, appropriateness of regimen, drug supplied, height, weight, BSA, dose calculations, expiration dates/times, drug appearance, and two patient identifiers were verified by both RNs. Drug checked for vesicant/irritant status and for risk of hypersensitivity. Most recent laboratory values and allergies, were reviewed. Positive, brisk blood return via CVC was confirmed prior to administration. Chest x-ray for correct line placement reviewed. Leah Montano RN and Odilia Gastelum RN verified correct rate of chemotherapy and maintenance IV fluids. Patient was educated on chemotherapy regimen prior to administration including indication for treatment related to disease & side effects of chemotherapy drug. Patient verbalizes understanding of all instructions. Completion of Chemotherapy: Monitoring during infusion done per policy, see Flowsheets. Blood return verified before, during, and after infusion per policy; no signs of extravasation. Pt tolerating chemotherapy well and without incident. Will continue to monitor.

## 2021-10-25 NOTE — PROGRESS NOTES
lipid microspheres (DEFINITY) injection 1.65 mg, 1.5 mL, IntraVENous, ONCE PRN  lisinopril (PRINIVIL;ZESTRIL) tablet 10 mg, 10 mg, Oral, Daily  etoposide (VEPESID) 220 mg in sodium chloride 0.9 % 600 mL chemo IVPB, 220 mg, IntraVENous, Q24H  allopurinol (ZYLOPRIM) tablet 300 mg, 300 mg, Oral, Daily  levETIRAcetam (KEPPRA) tablet 1,000 mg, 1,000 mg, Oral, BID  dexamethasone (DECADRON) tablet 4 mg, 4 mg, Oral, 4 times per day  pantoprazole (PROTONIX) tablet 40 mg, 40 mg, Oral, QAM AC  0.9 % sodium chloride infusion, , IntraVENous, Continuous  ondansetron (ZOFRAN-ODT) disintegrating tablet 4 mg, 4 mg, Oral, Q8H PRN **OR** ondansetron (ZOFRAN) injection 4 mg, 4 mg, IntraVENous, Q6H PRN  sodium chloride flush 0.9 % injection 5-40 mL, 5-40 mL, IntraVENous, 2 times per day  sodium chloride flush 0.9 % injection 5-40 mL, 5-40 mL, IntraVENous, PRN  0.9 % sodium chloride infusion, 25 mL, IntraVENous, PRN  enoxaparin (LOVENOX) injection 40 mg, 40 mg, SubCUTAneous, Daily  polyethylene glycol (GLYCOLAX) packet 17 g, 17 g, Oral, Daily PRN  acetaminophen (TYLENOL) tablet 650 mg, 650 mg, Oral, Q6H PRN **OR** acetaminophen (TYLENOL) suppository 650 mg, 650 mg, Rectal, Q6H PRN  levothyroxine (SYNTHROID) tablet 100 mcg, 100 mcg, Oral, Daily    Allergies  No Known Allergies    Physical Exam  VITALS:  BP (!) 136/93   Pulse 70   Temp 97.7 °F (36.5 °C) (Oral)   Resp 18   Ht 6' 2\" (1.88 m)   Wt 212 lb 8 oz (96.4 kg)   SpO2 99%   BMI 27.28 kg/m²   TEMPERATURE:  Current - Temp: 97.7 °F (36.5 °C); Max - Temp  Av °F (36.7 °C)  Min: 97.7 °F (36.5 °C)  Max: 99.1 °F (37.3 °C)  PULSE OXIMETRY RANGE: SpO2  Av.4 %  Min: 94 %  Max: 99 %  24HR INTAKE/OUTPUT:      Intake/Output Summary (Last 24 hours) at 10/25/2021 0719  Last data filed at 10/25/2021 7575  Gross per 24 hour   Intake 2601.61 ml   Output 3500 ml   Net -898.39 ml       Physical Exam  Constitutional:       General: He is not in acute distress.   Eyes:      General: No scleral icterus. Cardiovascular:      Rate and Rhythm: Normal rate and regular rhythm. Heart sounds: No murmur heard. No gallop. Pulmonary:      Breath sounds: Rhonchi (scattered, L lung) present. Abdominal:      General: Bowel sounds are normal.      Palpations: Abdomen is soft. Tenderness: There is no abdominal tenderness. Musculoskeletal:         General: No swelling. Lymphadenopathy:      Cervical: No cervical adenopathy. Skin:     General: Skin is warm and dry. Findings: No rash. Neurological:      Mental Status: He is alert and oriented to person, place, and time. Labs  Recent Labs     10/23/21  0409 10/24/21  0425 10/25/21  0330   WBC 10.4 10.3 9.4   HGB 12.7* 12.7* 12.3*   HCT 39.0* 38.3* 38.3*    200 185   MCV 87.7 85.8 87.5       Recent Labs     10/23/21  0409 10/24/21  0425 10/25/21  0330    140 135*   K 4.4 4.1 4.2    107 104   CO2 24 23 22   BUN 25* 22* 25*   CREATININE 0.8 0.7* 0.8       No results for input(s): AST, ALT, ALB, BILIDIR, BILITOT, ALKPHOS in the last 72 hours. No results for input(s): MG in the last 72 hours. Radiology  CT HEAD WO CONTRAST    Result Date: 10/20/2021  EXAMINATION: CT OF THE HEAD WITHOUT CONTRAST  10/20/2021 7:03 am TECHNIQUE: CT of the head was performed without the administration of intravenous contrast. Dose modulation, iterative reconstruction, and/or weight based adjustment of the mA/kV was utilized to reduce the radiation dose to as low as reasonably achievable. COMPARISON: None. HISTORY: ORDERING SYSTEM PROVIDED HISTORY: ams ro ich TECHNOLOGIST PROVIDED HISTORY: Reason for exam:->ams ro ich Has a \"code stroke\" or \"stroke alert\" been called? ->No Decision Support Exception - unselect if not a suspected or confirmed emergency medical condition->Emergency Medical Condition (MA) Reason for Exam: ams ro ich Acuity: Acute Type of Exam: Initial FINDINGS: BRAIN/VENTRICLES: Motion artifact degrades image quality. There is no acute intracerebral hemorrhage or extra-axial fluid collection. There is mild cerebral atrophy with mild periventricular, subcortical and deep white matter small vessel ischemic disease. However, there is low attenuation within the white matter of the right frontal high convexity with a probable 8 mm intra-axial lesion likely due to malignancy with associated vasogenic edema. Similar-appearing low-attenuation area within the right temporoparietal lobe is also noted. ORBITS: The orbits are unremarkable. SINUSES: Mucous retention cyst present in the bilateral maxillary sinuses. Mild mucosal hypertrophy involves the bilateral ethmoid air cells. SOFT TISSUES/SKULL:  The calvarium is intact. 1. Low attenuating areas within the right frontal and right temporoparietal lobes likely due to vasogenic edema from malignancy rather than age-indeterminate infarcts. Recommend further evaluation with MRI of the head with and without contrast for further evaluation. CT CERVICAL SPINE WO CONTRAST    Result Date: 10/20/2021  EXAMINATION: CT OF THE CERVICAL SPINE WITHOUT CONTRAST 10/20/2021 7:08 am: TECHNIQUE: CT of the cervical spine was performed without the administration of intravenous contrast. Multiplanar reformatted images are provided for review. Dose modulation, iterative reconstruction, and/or weight based adjustment of the mA/kV was utilized to reduce the radiation dose to as low as reasonably achievable. COMPARISON: 03/12/2007 HISTORY: ORDERING SYSTEM PROVIDED HISTORY: Kristen calvillo TECHNOLOGIST PROVIDED HISTORY: Reason for exam:->kerry perez Decision Support Exception - unselect if not a suspected or confirmed emergency medical condition->Emergency Medical Condition (MA) Reason for Exam: kerry cornell Acuity: Acute Type of Exam: Initial FINDINGS: BONES/ALIGNMENT: There is no acute fracture. The 7 cervical vertebral bodies are in anatomic alignment. The craniocervical junction is intact. DEGENERATIVE CHANGES: There is moderate multilevel spondylosis and facet arthropathy. There is mild widening of the right C3-4 facet with mild irregularity of the articular surface which is likely due to degenerative changes as opposed to a traumatic injury. SOFT TISSUES: There is no prevertebral soft tissue swelling. Mild emphysema involves the bilateral upper lungs. There is bibasilar atelectasis with mild interstitial edema. 1. No acute fracture. If the patient continues to experience pain, recommend MRI of the cervical spine for further evaluation. CT THORACIC SPINE WO CONTRAST    Result Date: 10/20/2021  EXAMINATION: CT OF THE THORACIC SPINE WITHOUT CONTRAST  10/20/2021 7:05 am: TECHNIQUE: CT of the thoracic spine was performed without the administration of intravenous contrast. Multiplanar reformatted images are provided for review. Dose modulation, iterative reconstruction, and/or weight based adjustment of the mA/kV was utilized to reduce the radiation dose to as low as reasonably achievable. COMPARISON: None. HISTORY: ORDERING SYSTEM PROVIDED HISTORY: kerry cornell TECHNOLOGIST PROVIDED HISTORY: Reason for exam:->kerry cornell Reason for Exam: kerry cornell Acuity: Acute Type of Exam: Initial FINDINGS: BONES/ALIGNMENT: There is normal alignment of the spine. The vertebral body heights are maintained. No osseous destructive lesion is seen. DEGENERATIVE CHANGES: Mild-to-moderate spondylosis of the spine with marginal osteophytes most significant in the mid to lower thoracic spine. No bony spinal canal stenosis. No significant listhesis. SOFT TISSUES: No paraspinal mass is seen. Multifocal consolidations most significant in the left lower lobe concerning for pneumonia. Other etiologies not excluded. No acute osseous abnormality. Moderate spondylosis. Consolidations within the visualized lung fields concerning for pneumonia with indeterminate left infrahilar consolidation.   Recommend dedicated CT chest.     CT CHEST ABDOMEN PELVIS W CONTRAST    Result Date: 10/20/2021  EXAMINATION: CT OF THE CHEST, ABDOMEN, AND PELVIS WITH CONTRAST 10/20/2021 7:04 am; 10/20/2021 7:05 am TECHNIQUE: CT of the chest, abdomen and pelvis was performed with the administration of intravenous contrast. Multiplanar reformatted images are provided for review. Dose modulation, iterative reconstruction, and/or weight based adjustment of the mA/kV was utilized to reduce the radiation dose to as low as reasonably achievable. COMPARISON: None HISTORY: ORDERING SYSTEM PROVIDED HISTORY: amd . trauma,, zx, pain ro omid oliverio Marshall Custard, ? free air divertic TECHNOLOGIST PROVIDED HISTORY: Reason for exam:->amd . trauma,, zx, pain ro omid oliverio Marshall Custard, ? free air divertic Additional Contrast?->None Decision Support Exception - unselect if not a suspected or confirmed emergency medical condition->Emergency Medical Condition (MA) Reason for Exam: amd . trauma,, zx, pain ro omid oliverio Marshall Custard, ? free air divertic Acuity: Acute Type of Exam: Initial; ORDERING SYSTEM PROVIDED HISTORY: kerry cornell TECHNOLOGIST PROVIDED HISTORY: Reason for exam:->kerry cornell Reason for Exam: kerry cornell Acuity: Acute Type of Exam: Initial CHEST: Mediastinum: 1. Mass surrounds the left hilum inferiorly. This could obscure associated infrahilar adenopathy. No significant mediastinal or right hilar adenopathy is seen otherwise. 2. The heart is mildly enlarged. There is no pericardial effusion. Coronary calcification is seen primarily LAD. 3. The aorta and pulmonary vessels are unremarkable for age. Lungs/pleura: 1. A large mass is seen surrounding and extending inferior to the left hilum. This measures 6.4 x 4.4 cm. Subtle mass protrudes into the left lower lobe bronchus, sample series 4, image 63. This is better seen on coronal image 82.   This could potentially represent mucoid material.  In addition there is airspace disease peripherally into the lower lobe likely some degree of postobstructive volume loss or pneumonia. Pleural effusion is also present possibly malignant. 2. There is patchy bilateral airspace disease elsewhere greatest involving the right lower lobe posteriorly. This could be atelectatic disease however pneumonia or aspiration is also considered. 3. No pneumothorax. This is in spite of rib fractures as described below. Soft Tissues/Bones: Bilateral rib fractures are present. Some of these are better seen on sagittal projection due to the fracture being within the axial plane. This involves the anterolateral right 3rd and 4th ribs as well as the left lateral 7th rib. Abdomen/Pelvis: Organs; 1. Liver: The density is unremarkable with no focal suspicious liver lesion. 2. Gallbladder: Gallstones are seen within the gallbladder without significant wall thickening. There is no biliary dilation. 3. Spleen: Normal. 4. Pancreas: Normal. 5. Kidneys: Unremarkable. No hydronephrosis or suspicious lesion. 6. Adrenal glands: Large mass is seen involving the left adrenal gland measuring 4.6 x 4.4 cm. The right adrenal gland is minimally enlarged. GI/Bowel: There is no distention, obstruction or significant inflammatory change. Multiple diverticuli are scattered throughout the colon, greater descending and sigmoid colon. There is some stool within the colon without distention. The appendix is normal. Pelvis: Urinary bladder is mostly empty with a Cano catheter present. There is no free pelvic fluid. Peritoneum/Retroperitoneum: No additional mass or retroperitoneal adenopathy. No ascites. Soft Tissues/Bones: No acute abnormality. .     Large left infrahilar mass highly suspicious for primary lung carcinoma with metastatic disease suspected to the left adrenal gland and possible malignant pleural effusion. Mass versus mucoid material is seen in the left lower bronchus focally. Postobstructive airspace disease could represent pneumonia or atelectasis.  Bilateral clear.  The mastoid air cells are clear. BONES/SOFT TISSUES: The bone marrow signal intensity appears normal. The soft tissues demonstrate no acute abnormality. Multiple brain parenchymal metastases in the supratentorial and infratentorial brain as discussed above. There are approximately 18 metastatic lesions scattered throughout the brain. The largest lesion is in the right temporal lobe and measures 1.4 cm. The 2nd largest lesion is a right frontal parietal lesion measuring 1.2 cm near the midline. There is mild edema associated with many of the lesions without midline shift or significant mass effect. CT LUMBAR SPINE TRAUMA RECONSTRUCTION    Result Date: 10/20/2021  EXAMINATION: CT OF THE LUMBAR SPINE WITHOUT CONTRAST  10/20/2021 TECHNIQUE: CT of the lumbar spine was performed without the administration of intravenous contrast. Multiplanar reformatted images are provided for review. Adjustment of mA and/or kV according to patient size was utilized. Dose modulation, iterative reconstruction, and/or weight based adjustment of the mA/kV was utilized to reduce the radiation dose to as low as reasonably achievable. COMPARISON: None HISTORY: ORDERING SYSTEM PROVIDED HISTORY: TRAUMA TECHNOLOGIST PROVIDED HISTORY: Reason for exam:->trauma, sz, pain Reason for Exam: traumna, sz Acuity: Acute Type of Exam: Initial FINDINGS: BONES/ALIGNMENT: There is normal alignment of the spine. The vertebral body heights are maintained. No osseous destructive lesion is seen. DEGENERATIVE CHANGES: Multilevel degenerative changes. SOFT TISSUES/RETROPERITONEUM: No paraspinal mass is seen.      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  He understands that small cell lung cancer is an incurable disease and that the goals of therapy are disease control and palliation of symptoms.     Risk of Complications/Morbidity/Mortality High   Illness(es) present that pose threat to life/bodily function   Severe exacerbation of illness/side effects of treatment   Use of parenteral controlled substances   Therapy requiring intensive monitoring for toxicity    Chay Hester MD  10/25/2021

## 2021-10-25 NOTE — PROGRESS NOTES
Hospitalist Progress Note      PCP: Aleksander Celis    Date of Admission: 10/20/2021    CC seizures  Hospital course  Patient 72-year-old gentleman with history of hypertension, hypothyroid presented to [place] ER for seizures. He was noted to have intracranial mass on CT transferred to [facility], [facility]. with neurosurgery consultation who recommended no surgical intervention. Further work-up revealed left lower lobe lung mass with extension into mediastinum, left adrenal mass patient had bronchoscopy with biopsy which showed small cell lung cancer. Patient had port placement by general surgery on 10/23 currently getting chemotherapy with oncology recommendation. Of note patient had a hospitalization at [facility] summer 2021 for pleural effusion thoracentesis negative for malignancy. Likely had decortication from thoracic surgery. Subjective  Patient seen and examined today wife at bedside. Denies any nausea, vomiting, fever, chills. No acute event reported overnight. Patient in A. fib with RVR pending cardiology evaluation    Assessment and plan  #Metastatic small cell lung cancer present on admission  #Intracranial metastatic lesion  #Seizure suspected secondary to intracranial metastatic lesion  S/p bronchoscopy with biopsy forcep biopsy of left lower lobe mass showed small cell lung cancer  Currently on day 3 of chemotherapy cycle 1  Patient will need radiation therapy after discharge  Continue Keppra for seizure prophylaxis  Decadron as per radiation oncology recommendation  S/p lumbar puncture 10/20 - CSF study within normal limit. CSF cytology showed no malignancy. #Paroxysmal A. fib  Currently A. fib. Echo report pending. Continue metoprolol 25 mg twice daily  Anticoagulation will defer to cardiology. Cardiology eval pending    #Hypertension stable  Continue metoprolol, lisinopril    #Hypothyroid  Continue Synthroid.           Medications:  Reviewed    Infusion Medications    sodium chloride 100 mL/hr at 10/25/21 1014    sodium chloride 10 mL/hr at 10/22/21 0442     Scheduled Medications    metoprolol tartrate  25 mg Oral BID    lisinopril  10 mg Oral Daily    etoposide (VEPESID) chemo IVPB  220 mg IntraVENous Q24H    allopurinol  300 mg Oral Daily    levETIRAcetam  1,000 mg Oral BID    dexamethasone  4 mg Oral 4 times per day    pantoprazole  40 mg Oral QAM AC    sodium chloride flush  5-40 mL IntraVENous 2 times per day    enoxaparin  40 mg SubCUTAneous Daily    levothyroxine  100 mcg Oral Daily     PRN Meds: perflutren lipid microspheres, ondansetron **OR** ondansetron, sodium chloride flush, sodium chloride, polyethylene glycol, acetaminophen **OR** acetaminophen      Intake/Output Summary (Last 24 hours) at 10/25/2021 1210  Last data filed at 10/25/2021 1137  Gross per 24 hour   Intake 2861.61 ml   Output 4625 ml   Net -1763.39 ml       Physical Exam Performed:    BP (!) 122/90   Pulse 68   Temp 97.7 °F (36.5 °C) (Oral)   Resp 16   Ht 6' 2\" (1.88 m)   Wt 214 lb 1.1 oz (97.1 kg)   SpO2 97%   BMI 27.48 kg/m²     General appearance: No apparent distress, appears stated age and cooperative. HEENT: Pupils equal, round, and reactive to light. Conjunctivae/corneas clear. Neck: Supple, with full range of motion. No jugular venous distention. Trachea midline. Respiratory:  Normal respiratory effort. Clear to auscultation, bilaterally without Rales/Wheezes/Rhonchi. Cardiovascular: Irregular rhythm with normal S1/S2 without murmurs, rubs or gallops. Abdomen: Soft, non-tender, non-distended with normal bowel sounds. Musculoskeletal: No clubbing, cyanosis or edema bilaterally. Full range of motion without deformity. Skin: Skin color, texture, turgor normal.  No rashes or lesions. Neurologic:  Neurovascularly intact without any focal sensory/motor deficits.  Cranial nerves: II-XII intact, grossly non-focal.  Psychiatric: Alert and oriented, thought content appropriate, normal insight  Capillary Refill: Brisk,< 3 seconds   Peripheral Pulses: +2 palpable, equal bilaterally   Port on right side of chest    Labs:   Recent Labs     10/23/21  0409 10/24/21  0425 10/25/21  0330   WBC 10.4 10.3 9.4   HGB 12.7* 12.7* 12.3*   HCT 39.0* 38.3* 38.3*    200 185     Recent Labs     10/23/21  0409 10/24/21  0425 10/25/21  0330    140 135*   K 4.4 4.1 4.2    107 104   CO2 24 23 22   BUN 25* 22* 25*   CREATININE 0.8 0.7* 0.8   CALCIUM 8.7 8.3 8.3     No results for input(s): AST, ALT, BILIDIR, BILITOT, ALKPHOS in the last 72 hours. Recent Labs     10/23/21  0351   INR 0.96     No results for input(s): Mary Alicesandra Lorenzo in the last 72 hours. Urinalysis:      Lab Results   Component Value Date    NITRU Negative 10/20/2021    WBCUA 1 10/20/2021    RBCUA 1 10/20/2021    BLOODU Negative 10/20/2021    SPECGRAV 1.014 10/20/2021    GLUCOSEU Negative 10/20/2021       Radiology:  XR CHEST PORTABLE   Final Result      XR CHEST 1 VIEW   Final Result   1. As above. FLUORO FOR SURGICAL PROCEDURES   Final Result   1. As above. Assessment/Plan:    Active Hospital Problems    Diagnosis Date Noted    Lesion of lung [R91.1] 10/21/2021    First time seizure ([address] [R56.9] 10/21/2021    Hypothyroid [E03.9]     Intracranial mass [R90.0] 10/20/2021         DVT Prophylaxis: lovenox  Diet: ADULT DIET; Regular  Code Status: Full Code    PT/OT Eval Status: completed   Disposition. Patient is a scheduled to get third dose of chemotherapy cycle 1 later tonight.   Pending cardiology eval    This chart was likely completed using voice recognition technology and may contain unintended grammatical , phraseology,and/or punctuation errors      Filomena Orellana MD

## 2021-10-25 NOTE — PLAN OF CARE
Problem: Pain:  Goal: Pain level will decrease  Description: Pain level will decrease  10/25/2021 1408 by Van Lee RN  Outcome: Ongoing  Pt with c/o shoulder pain and requesting PRN tylenol this AM. Administered PRN tylenol as noted on eMAR. Upon re-assessment pt states pain is 0/10 and satisfied. Pt instructed to notify RN at onset of pain for best management, pt verbalized understanding, call light in reach. Will continue to monitor. Problem: Falls - Risk of:  Goal: Will remain free from falls  Description: Will remain free from falls  10/25/2021 1408 by Van Lee RN  Outcome: Ongoing  Note: Orthostatic vital signs remain stable see flowsheet for details. Pt does not meet criteria for orthostasis. Pt is a Med fall risk. See Rose Laxmi Fall Score and ABCDS Injury Risk assessments. Pt up ad senia, steady gait assessed, pt calls out appropriately. Will continue to monitor.      - Screening for Orthostasis AND not a [place] Risk per THORPE/ABCDS: Pt bed is in low position, side rails up, call light and belongings are in reach. Fall risk light is on outside pts room. Pt encouraged to call for assistance as needed. Will continue with hourly rounds for PO intake, pain needs, toileting and repositioning as needed. Problem: Skin Integrity:  Goal: Will show no infection signs and symptoms  Description: Will show no infection signs and symptoms  10/25/2021 1408 by Van Lee RN  Outcome: Ongoing  Note: Skin remains free of any signs/symptoms indicative of an infection. No changes in assessment.

## 2021-10-26 VITALS
DIASTOLIC BLOOD PRESSURE: 92 MMHG | BODY MASS INDEX: 27.47 KG/M2 | OXYGEN SATURATION: 98 % | RESPIRATION RATE: 20 BRPM | SYSTOLIC BLOOD PRESSURE: 152 MMHG | HEART RATE: 58 BPM | HEIGHT: 74 IN | WEIGHT: 214.07 LBS | TEMPERATURE: 98 F

## 2021-10-26 LAB
ANION GAP SERPL CALCULATED.3IONS-SCNC: 10 MMOL/L (ref 3–16)
BUN BLDV-MCNC: 23 MG/DL (ref 7–20)
CALCIUM SERPL-MCNC: 8.2 MG/DL (ref 8.3–10.6)
CHLORIDE BLD-SCNC: 105 MMOL/L (ref 99–110)
CO2: 22 MMOL/L (ref 21–32)
CREAT SERPL-MCNC: 0.7 MG/DL (ref 0.8–1.3)
GFR AFRICAN AMERICAN: >60
GFR NON-AFRICAN AMERICAN: >60
GLUCOSE BLD-MCNC: 142 MG/DL (ref 70–99)
HCT VFR BLD CALC: 37.5 % (ref 40.5–52.5)
HEMOGLOBIN: 12.3 G/DL (ref 13.5–17.5)
MCH RBC QN AUTO: 28.3 PG (ref 26–34)
MCHC RBC AUTO-ENTMCNC: 32.7 G/DL (ref 31–36)
MCV RBC AUTO: 86.6 FL (ref 80–100)
PDW BLD-RTO: 16.2 % (ref 12.4–15.4)
PLATELET # BLD: 198 K/UL (ref 135–450)
PMV BLD AUTO: 9.1 FL (ref 5–10.5)
POTASSIUM REFLEX MAGNESIUM: 4.2 MMOL/L (ref 3.5–5.1)
RBC # BLD: 4.33 M/UL (ref 4.2–5.9)
SODIUM BLD-SCNC: 137 MMOL/L (ref 136–145)
WBC # BLD: 8.5 K/UL (ref 4–11)

## 2021-10-26 PROCEDURE — 80048 BASIC METABOLIC PNL TOTAL CA: CPT

## 2021-10-26 PROCEDURE — 2580000003 HC RX 258: Performed by: STUDENT IN AN ORGANIZED HEALTH CARE EDUCATION/TRAINING PROGRAM

## 2021-10-26 PROCEDURE — 99232 SBSQ HOSP IP/OBS MODERATE 35: CPT | Performed by: NURSE PRACTITIONER

## 2021-10-26 PROCEDURE — 6360000002 HC RX W HCPCS: Performed by: STUDENT IN AN ORGANIZED HEALTH CARE EDUCATION/TRAINING PROGRAM

## 2021-10-26 PROCEDURE — 6360000002 HC RX W HCPCS: Performed by: INTERNAL MEDICINE

## 2021-10-26 PROCEDURE — 85027 COMPLETE CBC AUTOMATED: CPT

## 2021-10-26 PROCEDURE — 6370000000 HC RX 637 (ALT 250 FOR IP): Performed by: INTERNAL MEDICINE

## 2021-10-26 PROCEDURE — 6370000000 HC RX 637 (ALT 250 FOR IP): Performed by: STUDENT IN AN ORGANIZED HEALTH CARE EDUCATION/TRAINING PROGRAM

## 2021-10-26 RX ORDER — LEVOTHYROXINE SODIUM 0.1 MG/1
100 TABLET ORAL DAILY
Qty: 30 TABLET | Refills: 1 | COMMUNITY
Start: 2021-10-27

## 2021-10-26 RX ORDER — PANTOPRAZOLE SODIUM 40 MG/1
40 TABLET, DELAYED RELEASE ORAL
Qty: 30 TABLET | Refills: 1 | Status: SHIPPED | OUTPATIENT
Start: 2021-10-27

## 2021-10-26 RX ORDER — LEVETIRACETAM 1000 MG/1
1000 TABLET ORAL 2 TIMES DAILY
Qty: 60 TABLET | Refills: 1 | Status: SHIPPED | OUTPATIENT
Start: 2021-10-26

## 2021-10-26 RX ORDER — ONDANSETRON 4 MG/1
4 TABLET, ORALLY DISINTEGRATING ORAL EVERY 8 HOURS PRN
Qty: 30 TABLET | Refills: 0 | Status: SHIPPED | OUTPATIENT
Start: 2021-10-26

## 2021-10-26 RX ORDER — HEPARIN SODIUM (PORCINE) LOCK FLUSH IV SOLN 100 UNIT/ML 100 UNIT/ML
500 SOLUTION INTRAVENOUS ONCE
Status: COMPLETED | OUTPATIENT
Start: 2021-10-26 | End: 2021-10-26

## 2021-10-26 RX ORDER — DEXAMETHASONE 4 MG/1
4 TABLET ORAL EVERY 6 HOURS
Qty: 120 TABLET | Refills: 0 | Status: SHIPPED | OUTPATIENT
Start: 2021-10-26 | End: 2021-11-25

## 2021-10-26 RX ORDER — ALLOPURINOL 300 MG/1
300 TABLET ORAL DAILY
Qty: 30 TABLET | Refills: 1 | Status: SHIPPED | OUTPATIENT
Start: 2021-10-27

## 2021-10-26 RX ADMIN — DEXAMETHASONE 4 MG: 4 TABLET ORAL at 10:48

## 2021-10-26 RX ADMIN — HEPARIN 500 UNITS: 100 SYRINGE at 10:36

## 2021-10-26 RX ADMIN — SODIUM CHLORIDE, PRESERVATIVE FREE 10 ML: 5 INJECTION INTRAVENOUS at 08:40

## 2021-10-26 RX ADMIN — METOPROLOL TARTRATE 25 MG: 25 TABLET, FILM COATED ORAL at 08:39

## 2021-10-26 RX ADMIN — PANTOPRAZOLE SODIUM 40 MG: 40 TABLET, DELAYED RELEASE ORAL at 06:39

## 2021-10-26 RX ADMIN — ALLOPURINOL 300 MG: 300 TABLET ORAL at 08:39

## 2021-10-26 RX ADMIN — LEVOTHYROXINE SODIUM 100 MCG: 0.1 TABLET ORAL at 06:39

## 2021-10-26 RX ADMIN — SODIUM CHLORIDE: 9 INJECTION, SOLUTION INTRAVENOUS at 08:38

## 2021-10-26 RX ADMIN — DEXAMETHASONE 4 MG: 4 TABLET ORAL at 06:39

## 2021-10-26 RX ADMIN — ENOXAPARIN SODIUM 40 MG: 100 INJECTION SUBCUTANEOUS at 08:38

## 2021-10-26 RX ADMIN — LISINOPRIL 10 MG: 10 TABLET ORAL at 08:39

## 2021-10-26 RX ADMIN — LEVETIRACETAM 1000 MG: 500 TABLET, FILM COATED ORAL at 08:38

## 2021-10-26 RX ADMIN — SODIUM CHLORIDE, PRESERVATIVE FREE 10 ML: 5 INJECTION INTRAVENOUS at 10:36

## 2021-10-26 ASSESSMENT — ENCOUNTER SYMPTOMS
SHORTNESS OF BREATH: 0
GASTROINTESTINAL NEGATIVE: 1
COUGH: 1
EYES NEGATIVE: 1
ALLERGIC/IMMUNOLOGIC NEGATIVE: 1

## 2021-10-26 ASSESSMENT — PAIN SCALES - GENERAL
PAINLEVEL_OUTOF10: 0
PAINLEVEL_OUTOF10: 0

## 2021-10-26 NOTE — PROGRESS NOTES
Reviewed discharge instructions with patient and  spouse. Reviewed discharge medications including dosing, schedule, indication, and adverse reactions. Reviewed which medications were already taken today and next dosage due for each medication. Patient is being discharged with IV access d/t need for ongoing therapy:      Type:  Port   , line was flushed with heparin prior to discharge       Pt and wife aware to call [facility] and radiation oncology to make appointment for tomorrow. Patient verbalized understanding of all instructions and questions were answered to his. satisfaction. Signed discharge instructions were given to the patient and a copy placed in the paper-lite chart. Patient discharged to home per wheelchair with spouse.       Nain Burk RN

## 2021-10-26 NOTE — ONCOLOGY
Administration: Chemotherapy drug Etoposide independently verified with Robb Cook RN prior to administration. Acknowledgement of informed consent for chemotherapy administration verified. Original order, appropriateness of regimen, drug supplied, height, weight, BSA, dose calculations, expiration dates/times, drug appearance, and two patient identifiers were verified by both RNs. Drug checked for vesicant/irritant status and for risk of hypersensitivity. Most recent laboratory values and allergies, were reviewed. Positive, brisk blood return via CVC was confirmed prior to administration. Chest x-ray for correct line placement reviewed. Natividad Ho RN and Robb Cook RN verified correct rate of chemotherapy and maintenance IV fluids. Patient was educated on chemotherapy regimen prior to administration including indication for treatment related to disease & side effects of chemotherapy drug. Patient verbalizes understanding of all instructions. Completion of Chemotherapy: Monitoring during infusion done per policy, see Flowsheets. Blood return verified before, during, and after infusion per policy; no signs of extravasation. Pt tolerated chemotherapy well and without incident. Chemotherapy infusion end time on the [facility]. Will continue to monitor.

## 2021-10-26 NOTE — PROGRESS NOTES
Oncology [facility]ology [facility]  Progress Note    Subjective: Tolerating chemotherapy well  Cycle 1 day 4 carboplatin/etoposide    Review of Systems:     Review of Systems   Constitutional: Negative for fever. HENT: Negative. Eyes: Negative. Respiratory: Positive for cough (Loose). Negative for shortness of breath. Cardiovascular: Negative. Gastrointestinal: Negative. Endocrine: Negative. Genitourinary: Negative. Musculoskeletal: Negative. Skin: Negative. Allergic/Immunologic: Negative. Neurological: Negative. Negative for seizures, weakness and headaches. Hematological: Negative. Objective:     HISTORY OF PRESENT ILLNESS:  Mr. Marisela Alejo  is a 73 y.o. male we are seeing in consultation for a radiographic picture highly suspicious for metastatic lung cancer. The chest CT shows a left hilar mass and the brain MRI shows multiple intracranial metastases. The patient was transferred to [facility] from [facility] due to the presence of multiple brain masses visualized on head CT. He originally presented to the [facility] ER by squad due to a suspected seizure. As noted, he was found to have masses on head CT. He was given dexamethasone and Keppra. A CT chest abdomen and pelvis was obtained that showed a left lower lobe lung mass with extension into the mediastinum. There was also a left adrenal mass. Apparently, in the summer 2021, the patient was evaluated for pneumonia with pleural effusion at [facility]. He underwent thoracentesis on 2 occasions and both times cytology was negative. He was treated with antibiotics and underwent a thoracic surgery procedure which I suspect was decortication. Dr. Jose Mena performed a bronchoscopy with biopsy. 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     Medications    Current Facility-Administered Medications: perflutren lipid microspheres (DEFINITY) injection 2.2 mg, 2 mL, IntraVENous, ONCE PRN  metoprolol tartrate (LOPRESSOR) tablet 25 mg, 25 mg, Oral, BID  lisinopril (PRINIVIL;ZESTRIL) tablet 10 mg, 10 mg, Oral, Daily  allopurinol (ZYLOPRIM) tablet 300 mg, 300 mg, Oral, Daily  levETIRAcetam (KEPPRA) tablet 1,000 mg, 1,000 mg, Oral, BID  dexamethasone (DECADRON) tablet 4 mg, 4 mg, Oral, 4 times per day  pantoprazole (PROTONIX) tablet 40 mg, 40 mg, Oral, QAM AC  0.9 % sodium chloride infusion, , IntraVENous, Continuous  ondansetron (ZOFRAN-ODT) disintegrating tablet 4 mg, 4 mg, Oral, Q8H PRN **OR** ondansetron (ZOFRAN) injection 4 mg, 4 mg, IntraVENous, Q6H PRN  sodium chloride flush 0.9 % injection 5-40 mL, 5-40 mL, IntraVENous, 2 times per day  sodium chloride flush 0.9 % injection 5-40 mL, 5-40 mL, IntraVENous, PRN  0.9 % sodium chloride infusion, 25 mL, IntraVENous, PRN  enoxaparin (LOVENOX) injection 40 mg, 40 mg, SubCUTAneous, Daily  polyethylene glycol (GLYCOLAX) packet 17 g, 17 g, Oral, Daily PRN  acetaminophen (TYLENOL) tablet 650 mg, 650 mg, Oral, Q6H PRN **OR** acetaminophen (TYLENOL) suppository 650 mg, 650 mg, Rectal, Q6H PRN  levothyroxine (SYNTHROID) tablet 100 mcg, 100 mcg, Oral, Daily    Allergies  No Known Allergies    Physical Exam  VITALS:  BP (!) 139/98   Pulse 58   Temp 98 °F (36.7 °C) (Oral)   Resp 22   Ht 6' 2\" (1.88 m)   Wt 214 lb 1.1 oz (97.1 kg)   SpO2 96%   BMI 27.48 kg/m²   TEMPERATURE:  Current - Temp: 98 °F (36.7 °C); Max - Temp  Av.8 °F (36.6 °C)  Min: 97.4 °F (36.3 °C)  Max: 98 °F (36.7 °C)  PULSE OXIMETRY RANGE: SpO2  Av.2 %  Min: 95 %  Max: 97 %  24HR INTAKE/OUTPUT:      Intake/Output Summary (Last 24 hours) at 10/26/2021 0636  Last data filed at 10/26/2021 8998  Gross per 24 hour   Intake 3676.68 ml   Output 2925 ml   Net 751.68 ml       Physical Exam  Constitutional:       General: He is not in acute distress. Eyes:      General: No scleral icterus. Cardiovascular:      Rate and Rhythm: Normal rate and regular rhythm.       Heart sounds: No murmur heard.   No gallop. Pulmonary:      Breath sounds: Rhonchi (scattered, L lung) present. Abdominal:      General: Bowel sounds are normal.      Palpations: Abdomen is soft. Tenderness: There is no abdominal tenderness. Musculoskeletal:         General: No swelling. Lymphadenopathy:      Cervical: No cervical adenopathy. Skin:     General: Skin is warm and dry. Findings: No rash. Neurological:      Mental Status: He is alert and oriented to person, place, and time. Labs  Recent Labs     10/24/21  0425 10/25/21  0330 10/26/21  0410   WBC 10.3 9.4 8.5   HGB 12.7* 12.3* 12.3*   HCT 38.3* 38.3* 37.5*    185 198   MCV 85.8 87.5 86.6       Recent Labs     10/24/21  0425 10/25/21  0330 10/26/21  0410    135* 137   K 4.1 4.2 4.2    104 105   CO2 23 22 22   BUN 22* 25* 23*   CREATININE 0.7* 0.8 0.7*       No results for input(s): AST, ALT, ALB, BILIDIR, BILITOT, ALKPHOS in the last 72 hours. No results for input(s): MG in the last 72 hours. Radiology  CT HEAD WO CONTRAST    Result Date: 10/20/2021  EXAMINATION: CT OF THE HEAD WITHOUT CONTRAST  10/20/2021 7:03 am TECHNIQUE: CT of the head was performed without the administration of intravenous contrast. Dose modulation, iterative reconstruction, and/or weight based adjustment of the mA/kV was utilized to reduce the radiation dose to as low as reasonably achievable. COMPARISON: None. HISTORY: ORDERING SYSTEM PROVIDED HISTORY: ams ro ich TECHNOLOGIST PROVIDED HISTORY: Reason for exam:->ams ro ich Has a \"code stroke\" or \"stroke alert\" been called? ->No Decision Support Exception - unselect if not a suspected or confirmed emergency medical condition->Emergency Medical Condition (MA) Reason for Exam: ams ro ich Acuity: Acute Type of Exam: Initial FINDINGS: BRAIN/VENTRICLES: Motion artifact degrades image quality. There is no acute intracerebral hemorrhage or extra-axial fluid collection. There is mild cerebral atrophy with mild periventricular, subcortical and deep white matter small vessel ischemic disease. However, there is low attenuation within the white matter of the right frontal high convexity with a probable 8 mm intra-axial lesion likely due to malignancy with associated vasogenic edema. Similar-appearing low-attenuation area within the right temporoparietal lobe is also noted. ORBITS: The orbits are unremarkable. SINUSES: Mucous retention cyst present in the bilateral maxillary sinuses. Mild mucosal hypertrophy involves the bilateral ethmoid air cells. SOFT TISSUES/SKULL:  The calvarium is intact. 1. Low attenuating areas within the right frontal and right temporoparietal lobes likely due to vasogenic edema from malignancy rather than age-indeterminate infarcts. Recommend further evaluation with MRI of the head with and without contrast for further evaluation. CT CERVICAL SPINE WO CONTRAST    Result Date: 10/20/2021  EXAMINATION: CT OF THE CERVICAL SPINE WITHOUT CONTRAST 10/20/2021 7:08 am: TECHNIQUE: CT of the cervical spine was performed without the administration of intravenous contrast. Multiplanar reformatted images are provided for review. Dose modulation, iterative reconstruction, and/or weight based adjustment of the mA/kV was utilized to reduce the radiation dose to as low as reasonably achievable. COMPARISON: 03/12/2007 HISTORY: ORDERING SYSTEM PROVIDED HISTORY: Priscilla Mckeon ro fx TECHNOLOGIST PROVIDED HISTORY: Reason for exam:->kerry perez Decision Support Exception - unselect if not a suspected or confirmed emergency medical condition->Emergency Medical Condition (MA) Reason for Exam: kerry cornell Acuity: Acute Type of Exam: Initial FINDINGS: BONES/ALIGNMENT: There is no acute fracture. The 7 cervical vertebral bodies are in anatomic alignment. The craniocervical junction is intact. DEGENERATIVE CHANGES: There is moderate multilevel spondylosis and facet arthropathy.   There is mild widening of the right C3-4 facet with mild irregularity of the articular surface which is likely due to degenerative changes as opposed to a traumatic injury. SOFT TISSUES: There is no prevertebral soft tissue swelling. Mild emphysema involves the bilateral upper lungs. There is bibasilar atelectasis with mild interstitial edema. 1. No acute fracture. If the patient continues to experience pain, recommend MRI of the cervical spine for further evaluation. CT THORACIC SPINE WO CONTRAST    Result Date: 10/20/2021  EXAMINATION: CT OF THE THORACIC SPINE WITHOUT CONTRAST  10/20/2021 7:05 am: TECHNIQUE: CT of the thoracic spine was performed without the administration of intravenous contrast. Multiplanar reformatted images are provided for review. Dose modulation, iterative reconstruction, and/or weight based adjustment of the mA/kV was utilized to reduce the radiation dose to as low as reasonably achievable. COMPARISON: None. HISTORY: ORDERING SYSTEM PROVIDED HISTORY: kerry cornell TECHNOLOGIST PROVIDED HISTORY: Reason for exam:->kerry cornell Reason for Exam: kerry cornell Acuity: Acute Type of Exam: Initial FINDINGS: BONES/ALIGNMENT: There is normal alignment of the spine. The vertebral body heights are maintained. No osseous destructive lesion is seen. DEGENERATIVE CHANGES: Mild-to-moderate spondylosis of the spine with marginal osteophytes most significant in the mid to lower thoracic spine. No bony spinal canal stenosis. No significant listhesis. SOFT TISSUES: No paraspinal mass is seen. Multifocal consolidations most significant in the left lower lobe concerning for pneumonia. Other etiologies not excluded. No acute osseous abnormality. Moderate spondylosis. Consolidations within the visualized lung fields concerning for pneumonia with indeterminate left infrahilar consolidation.   Recommend dedicated CT chest.     CT CHEST ABDOMEN PELVIS W CONTRAST    Result Date: 10/20/2021  EXAMINATION: CT OF THE CHEST, ABDOMEN, AND PELVIS WITH CONTRAST 10/20/2021 7:04 am; 10/20/2021 7:05 am TECHNIQUE: CT of the chest, abdomen and pelvis was performed with the administration of intravenous contrast. Multiplanar reformatted images are provided for review. Dose modulation, iterative reconstruction, and/or weight based adjustment of the mA/kV was utilized to reduce the radiation dose to as low as reasonably achievable. COMPARISON: None HISTORY: ORDERING SYSTEM PROVIDED HISTORY: amd . trauma,, zx, pain ro omid oliverio Johnny [place], ? free air divertic TECHNOLOGIST PROVIDED HISTORY: Reason for exam:->amd . trauma,, zx, pain ro omid oliverio Johnny [place], ? free air divertic Additional Contrast?->None Decision Support Exception - unselect if not a suspected or confirmed emergency medical condition->Emergency Medical Condition (MA) Reason for Exam: amd . trauma,, zx, pain ro omid oliverio Johnny [place], ? free air divertic Acuity: Acute Type of Exam: Initial; ORDERING SYSTEM PROVIDED HISTORY: kerry cornell TECHNOLOGIST PROVIDED HISTORY: Reason for exam:->kerry cornell Reason for Exam: kerry cornell Acuity: Acute Type of Exam: Initial CHEST: Mediastinum: 1. Mass surrounds the left hilum inferiorly. This could obscure associated infrahilar adenopathy. No significant mediastinal or right hilar adenopathy is seen otherwise. 2. The heart is mildly enlarged. There is no pericardial effusion. Coronary calcification is seen primarily LAD. 3. The aorta and pulmonary vessels are unremarkable for age. Lungs/pleura: 1. A large mass is seen surrounding and extending inferior to the left hilum. This measures 6.4 x 4.4 cm. Subtle mass protrudes into the left lower lobe bronchus, sample series 4, image 63. This is better seen on coronal image 82. This could potentially represent mucoid material.  In addition there is airspace disease peripherally into the lower lobe likely some degree of postobstructive volume loss or pneumonia. Pleural effusion is also present possibly malignant.  2. There is patchy bilateral airspace disease elsewhere greatest involving the right lower lobe posteriorly. This could be atelectatic disease however pneumonia or aspiration is also considered. 3. No pneumothorax. This is in spite of rib fractures as described below. Soft Tissues/Bones: Bilateral rib fractures are present. Some of these are better seen on sagittal projection due to the fracture being within the axial plane. This involves the anterolateral right 3rd and 4th ribs as well as the left lateral 7th rib. Abdomen/Pelvis: Organs; 1. Liver: The density is unremarkable with no focal suspicious liver lesion. 2. Gallbladder: Gallstones are seen within the gallbladder without significant wall thickening. There is no biliary dilation. 3. Spleen: Normal. 4. Pancreas: Normal. 5. Kidneys: Unremarkable. No hydronephrosis or suspicious lesion. 6. Adrenal glands: Large mass is seen involving the left adrenal gland measuring 4.6 x 4.4 cm. The right adrenal gland is minimally enlarged. GI/Bowel: There is no distention, obstruction or significant inflammatory change. Multiple diverticuli are scattered throughout the colon, greater descending and sigmoid colon. There is some stool within the colon without distention. The appendix is normal. Pelvis: Urinary bladder is mostly empty with a Cano catheter present. There is no free pelvic fluid. Peritoneum/Retroperitoneum: No additional mass or retroperitoneal adenopathy. No ascites. Soft Tissues/Bones: No acute abnormality. .     Large left infrahilar mass highly suspicious for primary lung carcinoma with metastatic disease suspected to the left adrenal gland and possible malignant pleural effusion. Mass versus mucoid material is seen in the left lower bronchus focally. Postobstructive airspace disease could represent pneumonia or atelectasis. Bilateral anterolateral rib fractures as identified above without pneumothorax. Cholelithiasis.  Patchy airspace disease right lower lung possible pneumonia versus aspiration versus atelectasis. MRI BRAIN W WO CONTRAST    Result Date: 10/20/2021  EXAMINATION: MRI OF THE BRAIN WITHOUT AND WITH CONTRAST  10/20/2021 11:09 am TECHNIQUE: Multiplanar multisequence MRI of the head/brain was performed without and with the administration of intravenous contrast. COMPARISON: CT head 10/20/2021. HISTORY: ORDERING SYSTEM PROVIDED HISTORY: right frontal mass, ro mass TECHNOLOGIST PROVIDED HISTORY: Reason for exam:->right frontal mass, ro mass Reason for Exam: EVALUATE STATUS WITH NEW ONSET OF BRAIN METS Acuity: Acute Type of Exam: Initial Additional signs and symptoms: EVALUATE STATUS WITH NEW ONSET OF BRAIN METS Relevant Medical/Surgical History: EVALUATE STATUS WITH NEW ONSET OF BRAIN METS FINDINGS: INTRACRANIAL STRUCTURES/VENTRICLES:  There is no acute infarct. The ventricles and cisternal spaces are normal in size, shape, and configuration for the age of the patient. There are multiple areas of abnormal signal scattered throughout the brain parenchyma consistent with metastatic disease. There are at least 4 cerebellar lesions. There are lesions in the right occipital lobe, right temporal lobe, right parietal lobe and right frontal lobe. There are lesions in the left parietal lobe and the medial aspect of the left thalamus protruding into the ventricle. There are approximately 18 metastatic lesions scattered throughout the brain. There is mild edema associated with the lesions. There is no significant midline shift. There are mild chronic small vessel ischemic changes. ORBITS: The visualized portion of the orbits demonstrate no acute abnormality. SINUSES: There is mild mucoperiosteal thickening of the ethmoid air cells. There are mucous retention cysts or polyps in the maxillary sinuses. The remainder of the sinuses are clear. The mastoid air cells are clear.  BONES/SOFT TISSUES: The bone marrow signal intensity appears normal. The soft tissues demonstrate no acute abnormality. Multiple brain parenchymal metastases in the supratentorial and infratentorial brain as discussed above. There are approximately 18 metastatic lesions scattered throughout the brain. The largest lesion is in the right temporal lobe and measures 1.4 cm. The 2nd largest lesion is a right frontal parietal lesion measuring 1.2 cm near the midline. There is mild edema associated with many of the lesions without midline shift or significant mass effect. CT LUMBAR SPINE TRAUMA RECONSTRUCTION    Result Date: 10/20/2021  EXAMINATION: CT OF THE LUMBAR SPINE WITHOUT CONTRAST  10/20/2021 TECHNIQUE: CT of the lumbar spine was performed without the administration of intravenous contrast. Multiplanar reformatted images are provided for review. Adjustment of mA and/or kV according to patient size was utilized. Dose modulation, iterative reconstruction, and/or weight based adjustment of the mA/kV was utilized to reduce the radiation dose to as low as reasonably achievable. COMPARISON: None HISTORY: ORDERING SYSTEM PROVIDED HISTORY: TRAUMA TECHNOLOGIST PROVIDED HISTORY: Reason for exam:->trauma, sz, pain Reason for Exam: traumna, sz Acuity: Acute Type of Exam: Initial FINDINGS: BONES/ALIGNMENT: There is normal alignment of the spine. The vertebral body heights are maintained. No osseous destructive lesion is seen. DEGENERATIVE CHANGES: Multilevel degenerative changes. SOFT TISSUES/RETROPERITONEUM: No paraspinal mass is seen.      No acute lumbar spine abnormality Multilevel degenerative disc disease and facet joint arthropathy       Pathology  Department of Pathology   FINAL SURGICAL PATHOLOGY REPORT   Patient Name: Lindsay Hanson          Accession No:  [id number]    Age Sex:   1950    71 Y / M      Location:      [place] [address]   Account No:   [de-identified]                 Collected:     10/21/2021   [facility] Rec No:    [id number]                Received:      10/21/2021   Attend Phys: Yara Medina M.D.        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 See case comment. COMMENT:    ... Selected slides have been prospectively viewed by   additional members of pathology department. Julia Ott submitted biopsy   specimen shows involvement with a poorly differentiated\high-grade   malignant neoplasm which has frequent mitotic activity and prominent   necrosis.  The infiltrate is composed of a population of small irregular   cells with scant cytoplasm and dispersed chromatin.  Immunoperoxidase   stains show the malignant neoplasm to be CK7 strongly positive, TTF-1   strongly positive, p40 negative, synaptophysin positive and chromogranin   positive. The histology and staining pattern are supportive of a small   cell undifferentiated carcinoma\high-grade neuroendocrine carcinoma. .   ............... Colt Hanley  ISENC/ISENC     Problem List  Patient Active Problem List   Diagnosis    Intracranial mass    Lesion of lung    First time seizure ([facility] [address]    Hypothyroid       Assessment and Plan:     Extensive Stage Small Cell Lung Cancer  - Left hilar mass with extension into the mediastinum  - Additional radiographic evidence of left adrenal metastasis and multiple intracranial metastases  - Pleural effusion  - Bronchoscopy with biopsy done Oct 21  - Pathology consistent with small cell lung cancer, poorly differentiated  - Now that we have pathology, we will also need molecular profiling - Requested  - Cycle 1 day 4 Carbo/Etoposide. He continues allopurinol, IVF  - Neulasta will be delivered in the [facility] office on Wednesday  - He will also have his OCM Wednesday.   - Cycle 2 will include atezolizumab  - Cycle 2 day 1 will be Monday Nov 15 at the [facility] office     Intracranial metastases  - Not a candidate for neurosurgical resection as this is multifocal disease  -Dr Kristi Pickett has seen the pt and is planning whole brain radiation  - He needs to F/U with rad onc asap upon D/C  -Agree with steroids and Keppra     Seizures  -Secondary to multiple intracranial metastases  -Experienced a fall during seizure episode which resulted in bilateral anterolateral rib fractures  -Agree with Decadron and Keppra     Discussed with patient and he understands he has 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  He understands that small cell lung cancer is an incurable disease and that the goals of therapy are disease control and palliation of symptoms.     Risk of Complications/Morbidity/Mortality High   Illness(es) present that pose threat to life/bodily function   Severe exacerbation of illness/side effects of treatment   Use of parenteral controlled substances   Therapy requiring intensive monitoring for toxicity    Clifm Homans, MD  10/26/2021

## 2021-10-26 NOTE — PLAN OF CARE
Problem: Pain:  Goal: Pain level will decrease  Description: Pain level will decrease  Outcome: Ongoing  Note: Patient has had no complaints of pain overnight. Will continue to assess. Problem: Falls - Risk of:  Goal: Will remain free from falls  Description: Will remain free from falls  Outcome: Ongoing  Note: Orthostatic vital signs obtained at start of shift - see flowsheet for details. Pt does not meet criteria for orthostasis. Pt is a Med fall risk. See Esta Blazing Fall Score and ABCDS Injury Risk assessments. Pt bed is in low position, side rails up, call light and belongings are in reach. Fall risk light is on outside pts room. Pt encouraged to call for assistance as needed. Will continue with hourly rounds for PO intake, pain needs, toileting and repositioning as needed. Problem: Skin Integrity:  Goal: Will show no infection signs and symptoms  Description: Will show no infection signs and symptoms  Outcome: Ongoing  Note: Patient has no new skin issues. Will continue to monitor.

## 2021-10-26 NOTE — PROGRESS NOTES
Electrophysiology - PROGRESS NOTE    Admit Date: 10/20/2021     Chief Complaint: AF     Interval History:   Patient seen and examined and notes reviewed. Patient is being followed for AF. He presented to the hospital for witnessed seizures, noted to be in AF, spontaneously converted to NSR. During stay he underwent bronchoscopy w/ biopsy consistent w/ small cell lung ca, he was started on chemo. He is in SB/ NSR today, states he feels pretty good. Pt denies palpitations, heart racing, dizziness, lightheadedness. No CP, SOB, PND, orthopnea, BLE edema. BP well controlled, elevated at times. In: 3176.7 [P.O.:420;  I.V.:2156.7]  Out: 1800    Wt Readings from Last 2 Encounters:   10/25/21 214 lb 1.1 oz (97.1 kg)   10/20/21 210 lb (95.3 kg)         Data:   Scheduled Meds:   Scheduled Meds:   metoprolol tartrate  25 mg Oral BID    lisinopril  10 mg Oral Daily    allopurinol  300 mg Oral Daily    levETIRAcetam  1,000 mg Oral BID    dexamethasone  4 mg Oral 4 times per day    pantoprazole  40 mg Oral QAM AC    sodium chloride flush  5-40 mL IntraVENous 2 times per day    enoxaparin  40 mg SubCUTAneous Daily    levothyroxine  100 mcg Oral Daily     Continuous Infusions:   sodium chloride 100 mL/hr at 10/26/21 1856    sodium chloride 10 mL/hr at 10/22/21 0442     PRN Meds:.perflutren lipid microspheres, ondansetron **OR** ondansetron, sodium chloride flush, sodium chloride, polyethylene glycol, acetaminophen **OR** acetaminophen  Continuous Infusions:   sodium chloride 100 mL/hr at 10/26/21 0633    sodium chloride 10 mL/hr at 10/22/21 0442       Intake/Output Summary (Last 24 hours) at 10/26/2021 0823  Last data filed at 10/26/2021 2393  Gross per 24 hour   Intake 3676.68 ml   Output 3425 ml   Net 251.68 ml       CBC:   Lab Results   Component Value Date    WBC 8.5 10/26/2021    HGB 12.3 10/26/2021     10/26/2021     BMP:  Lab Results   Component Value Date     10/26/2021    K 4.2 10/26/2021     10/26/2021    CO2 22 10/26/2021    BUN 23 10/26/2021    CREATININE 0.7 10/26/2021    GLUCOSE 142 10/26/2021     INR:   Lab Results   Component Value Date    INR 0.96 10/23/2021    INR 0.96 10/21/2021        CARDIAC LABS  ENZYMES:No results for input(s): CKMB, CKMBINDEX, TROPONINI in the last 72 hours. Invalid input(s): CKTOTAL;3  FASTING LIPID PANEL:No results found for: HDL, LDLDIRECT, LDLCALC, TRIG, TSH  LIVER PROFILE:  Lab Results   Component Value Date    AST 17 10/20/2021    ALT 20 10/20/2021       -----------------------------------------------------------------  Telemetry: Personally reviewed  SB/NSR, occ PVC's    Objective:   Vitals: BP (!) 139/98   Pulse 58   Temp 98 °F (36.7 °C) (Oral)   Resp 22   Ht 6' 2\" (1.88 m)   Wt 214 lb 1.1 oz (97.1 kg)   SpO2 96%   BMI 27.48 kg/m²   General appearance: alert, appears stated age and cooperative, No acute distress   Eyes: Conjunctiva and pupils normal and reactive  Skin: Skin color, texture, turgor normal. No rashes or ecchymosis. Neck: no JVD, supple, symmetrical, trachea midline   Lungs: , no accessory muscle use, no respiratory distress  Heart: RRR  Abdomen: soft, non-tender; bowel sounds normal  Extremities: No edema, DP +  Psychiatric: normal insight and affect    Patient Active Problem List:     Intracranial mass     Lesion of lung     First time seizure [facility])     Hypothyroid        Assessment & Plan:    1. Paroxysmal AF  2. HTN  3. Seizures  4. Small cell lung Ca  5. Intracranial Mets    Cardiac Hx: Dotty Rodriguez is a 73 y.o. man w/ PMH of HTN, hypothyroid, prostate Ca, who p/w suspected seizure, noted to be in AF, lung mass w/ brain mets, s/p bronch w/ biopsy showed small cell lung Ca  [id number] 2. TSH 0.89 (10/24/21).      pAF  - In NSR, no AF seen on tele  - On metoprolol 25 mg BID  - Consider placing pt on Eliquis 5 mg BID if no contraindications   - Echo showed EF 50%, LA 4.7 cm    HTN  - Mostly well controlled, intermittently elevated  - On metoprolol, lisinopril      Electronically signed by ЕЛЕНА Reyes CNP on 10/26/21 at 8:23 AM EDT    Kelli Saenz

## 2021-10-26 NOTE — DISCHARGE SUMMARY
Hospital Medicine Discharge Summary    Patient ID: Laverta Re      Patient's PCP: Den Maldonado    Admit Date: 10/20/2021     Discharge Date: 10/26/2021      Admitting Physician: Maryse Laird MD     Discharge Physician: Travis Ardon MD     Discharge Diagnoses: Active Hospital Problems    Diagnosis Date Noted    Lesion of lung [R91.1] 10/21/2021    First time seizure ([address] [R56.9] 10/21/2021    Hypothyroid [E03.9]     Intracranial mass [R90.0] 10/20/2021       The patient was seen and examined on day of discharge and this discharge summary is in conjunction with any daily progress note from day of discharge. Hospital Course: Patient 73-year-old gentleman with history of hypertension, hypothyroid presented to [facility] ER for seizures. He was noted to have intracranial mass on CT transferred to [facility]. with neurosurgery consultation who recommended no surgical intervention. Further work-up revealed left lower lobe lung mass with extension into mediastinum, left adrenal mass patient had bronchoscopy with biopsy which showed small cell lung cancer. Patient had port placement by general surgery on 10/23 currently getting chemotherapy with oncology recommendation. Patient did receive cycle 1 of chemotherapy. Seen and examined on day of discharge denies nausea, vomiting, fever, chills.   Medically stable to be discharged after clearance from oncology.     Final diagnosis  #Metastatic small cell lung cancer  #Intracranial metastatic lesion  #Seizure suspected intracranial metastatic lesion  Follow-up with oncology and radiation oncology  #Paroxysmal A. fib [id number] 2 we will discharge on aspirin follow-up with cardiology  #Hypertension  #Hypothyroid    Physical Exam Performed:     BP (!) 152/92   Pulse 58   Temp 98 °F (36.7 °C) (Oral)   Resp 20   Ht 6' 2\" (1.88 m)   Wt 214 lb 1.1 oz (97.1 kg)   SpO2 98%   BMI 27.48 kg/m²       General appearance:  No apparent distress, appears stated age and cooperative. HEENT:  Normal cephalic, atraumatic without obvious deformity. Pupils equal, round, and reactive to light. Extra ocular muscles intact. Conjunctivae/corneas clear. Neck: Supple, with full range of motion. No jugular venous distention. Trachea midline. Respiratory:  Normal respiratory effort. Clear to auscultation, bilaterally without Rales/Wheezes/Rhonchi. Cardiovascular:  Regular rate and rhythm with normal S1/S2 without murmurs, rubs or gallops. Abdomen: Soft, non-tender, non-distended with normal bowel sounds. Musculoskeletal:  No clubbing, cyanosis or edema bilaterally. Full range of motion without deformity. Skin: Skin color, texture, turgor normal.  No rashes or lesions. Neurologic:  Neurovascularly intact without any focal sensory/motor deficits. Cranial nerves: II-XII intact, grossly non-focal.  Psychiatric:  Alert and oriented, thought content appropriate, normal insight  Capillary Refill: Brisk,< 3 seconds   Peripheral Pulses: +2 palpable, equal bilaterally       Labs: For convenience and continuity at follow-up the following most recent labs are provided:      CBC:    Lab Results   Component Value Date    WBC 8.5 10/26/2021    HGB 12.3 10/26/2021    HCT 37.5 10/26/2021     10/26/2021       Renal:    Lab Results   Component Value Date     10/26/2021    K 4.2 10/26/2021     10/26/2021    CO2 22 10/26/2021    BUN 23 10/26/2021    CREATININE 0.7 10/26/2021    CALCIUM 8.2 10/26/2021    PHOS 3.1 10/22/2021         Significant Diagnostic Studies    Radiology:   XR CHEST PORTABLE   Final Result      XR CHEST 1 VIEW   Final Result   1. As above. FLUORO FOR SURGICAL PROCEDURES   Final Result   1. As above.              Consults:     IP CONSULT TO CRITICAL CARE  IP CONSULT TO ONCOLOGY  IP CONSULT TO RADIATION ONCOLOGY  IP CONSULT TO NEUROLOGY  IP CONSULT TO CARDIOLOGY    Disposition:  Home     Condition at Discharge: Stable    Discharge Instructions/Follow-up:  PCP, oncology, radiation oncology    Code Status:  Prior     Activity: activity as tolerated    Diet: regular diet      Discharge Medications:     Discharge Medication List as of 10/26/2021 10:11 AM           Details   levETIRAcetam (KEPPRA) 1000 MG tablet Take 1 tablet by mouth 2 times daily, Disp-60 tablet, R-1Normal      ondansetron (ZOFRAN-ODT) 4 MG disintegrating tablet Take 1 tablet by mouth every 8 hours as needed for Nausea or Vomiting, Disp-30 tablet, R-0Normal      metoprolol tartrate (LOPRESSOR) 25 MG tablet Take 0.5 tablets by mouth 2 times daily, Disp-60 tablet, R-1Normal      dexamethasone (DECADRON) 4 MG tablet Take 1 tablet by mouth every 6 hours, Disp-120 tablet, R-0Normal      allopurinol (ZYLOPRIM) 300 MG tablet Take 1 tablet by mouth daily, Disp-30 tablet, R-1Normal      levothyroxine (SYNTHROID) 100 MCG tablet Take 1 tablet by mouth Daily, Disp-30 tablet, R-1Historical Med      pantoprazole (PROTONIX) 40 MG tablet Take 1 tablet by mouth every morning (before breakfast), Disp-30 tablet, R-1Normal              Details   lisinopril (PRINIVIL;ZESTRIL) 10 MG tablet Take 10 mg by mouth dailyHistorical Med      atorvastatin (LIPITOR) 40 MG tablet Take 40 mg by mouth dailyHistorical Med      aspirin 81 MG EC tablet Take 81 mg by mouth dailyHistorical Med             Time Spent on discharge is more than 30 minutes in the examination, evaluation, counseling and review of medications and discharge plan. This chart was likely completed using voice recognition technology and may contain unintended grammatical , phraseology,and/or punctuation errors    Signed:    Leo Coronado MD   10/26/2021      Thank you Mian Hooper for the opportunity to be involved in this patient's care. If you have any questions or concerns please feel free to contact me at [phone].

## 2021-10-27 LAB
CSF CULTURE: NORMAL
GRAM STAIN RESULT: NORMAL

## 2021-10-27 NOTE — OP NOTE
OPERATIVE NOTE      Patient: Lana Brown  : 1950  MRN: [id number]     PREOPERATIVE DIAGNOSIS: Small cell lung cancer     POSTOPERATIVE DIAGNOSIS: Same     PROCEDURE: Right subclavian tunneled single lumen Port-A-Cath insertion with fluoroscopic guidance     SURGEON: Avery Bone MD     ANESTHESIA: MAC + Local     ESTIMATED BLOOD LOSS: Minimal     COMPLICATIONS: None     INDICATIONS: Port-A-Cath placement for chemotherapy as recommended by patient's oncologist.     Risks, benefits, and alternatives discussed with patient and any available family members in the preoperative area. Risks including but not limited to: bleeding, infection, hematoma, major vessel injury, malposition, need for re-operation or removal, and pneumothorax were discussed. All questions answered and patient consented to proceed.     PROCEDURE DETAILS:  The patient was brought to the operating theater and placed in the supine position with arms padded and tucked at sides. A towel roll was placed between the scapulae. The patient's bilateral upper chest and neck was then prepped and draped in sterile fashion using chlorhexidine solution. A time-out was performed confirming the patient's identity and operative site. Antibiotics were confirmed to be infusing. SCDs were on and functioning. All safety points were followed per hospital protocol.     Sedation was started by anesthesia provider. Patient was placed in Trendelenburg position. The right infraclavicular fossa was anesthetized with local anesthetic. The right subclavian vein was entered on the 1st attempt with return of venous blood. Guidewire was then positioned in the vena cava. This was confirmed using fluoroscopy.      4 cm pocket for the port was planned for the anterior chest wall 2-3 cm below the guidewire insertion site. The skin and tunnel tract were anesthetized with local anesthetic. Incision was then made using a 11-blade scalpel.  Electrocautery was then used to deepen the incision through subcutaneous tissue and a pocket was created in the subcutaneous tissue superficial to the fascia. Next, 11-blade scalpel was used to extend the guidewire exit site to a 1 cm incision. The catheter was then tunneled subcutaneously to the exit site of the guidewire. The dilator and sheath were placed over the guidewire in the vena cava under fluoroscopic guidance using Seldinger technique. The dilator and wire were subsequently removed. The catheter was then threaded through the sheath into the vena cava. The tip was positioned close to the cavoatrial junction, as confirmed using fluoroscopy.      The catheter was cut to appropriate length; the port/catheter locking mechanism was placed onto the catheter and the catheter was secured to the port. The port was then positioned in the pocket. The port was then accessed. Good return of venous blood was noted and flushed easily. Wounds were inspected. Good hemostasis was noted. Following this, the wounds were then closed using interrupted 3-0 Vicryl and running 4-0 monocril sutures.      The wounds were then cleaned, dried and dressed with surgical glue. All counts were correct at the end of the procedure.      The patient tolerated the procedure well and was taken to the recovery room in stable condition. Portable chest radiograph ordered for PACU.

## 2021-11-01 ENCOUNTER — OFFICE VISIT (OUTPATIENT)
Dept: CARDIOLOGY CLINIC | Age: 71
End: 2021-11-01
Payer: MEDICARE

## 2021-11-01 VITALS
HEART RATE: 54 BPM | SYSTOLIC BLOOD PRESSURE: 134 MMHG | DIASTOLIC BLOOD PRESSURE: 74 MMHG | WEIGHT: 215 LBS | BODY MASS INDEX: 27.6 KG/M2

## 2021-11-01 DIAGNOSIS — I48.0 PAROXYSMAL ATRIAL FIBRILLATION (HCC): Primary | ICD-10-CM

## 2021-11-01 DIAGNOSIS — I10 BENIGN ESSENTIAL HTN: ICD-10-CM

## 2021-11-01 PROCEDURE — G8427 DOCREV CUR MEDS BY ELIG CLIN: HCPCS | Performed by: NURSE PRACTITIONER

## 2021-11-01 PROCEDURE — 99214 OFFICE O/P EST MOD 30 MIN: CPT | Performed by: NURSE PRACTITIONER

## 2021-11-01 PROCEDURE — 93000 ELECTROCARDIOGRAM COMPLETE: CPT | Performed by: NURSE PRACTITIONER

## 2021-11-01 PROCEDURE — 93242 EXT ECG>48HR<7D RECORDING: CPT | Performed by: INTERNAL MEDICINE

## 2021-11-01 PROCEDURE — 3017F COLORECTAL CA SCREEN DOC REV: CPT | Performed by: NURSE PRACTITIONER

## 2021-11-01 PROCEDURE — 1111F DSCHRG MED/CURRENT MED MERGE: CPT | Performed by: NURSE PRACTITIONER

## 2021-11-01 PROCEDURE — 1036F TOBACCO NON-USER: CPT | Performed by: NURSE PRACTITIONER

## 2021-11-01 PROCEDURE — 1123F ACP DISCUSS/DSCN MKR DOCD: CPT | Performed by: NURSE PRACTITIONER

## 2021-11-01 PROCEDURE — G8484 FLU IMMUNIZE NO ADMIN: HCPCS | Performed by: NURSE PRACTITIONER

## 2021-11-01 PROCEDURE — 4040F PNEUMOC VAC/ADMIN/RCVD: CPT | Performed by: NURSE PRACTITIONER

## 2021-11-01 PROCEDURE — G8417 CALC BMI ABV UP PARAM F/U: HCPCS | Performed by: NURSE PRACTITIONER
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
## 2021-11-02 ENCOUNTER — NURSE ONLY (OUTPATIENT)
Dept: CARDIOLOGY CLINIC | Age: 71
End: 2021-11-02

## 2021-12-01 ENCOUNTER — TELEPHONE (OUTPATIENT)
Dept: CARDIOLOGY CLINIC | Age: 71
End: 2021-12-01

## 2021-12-01 PROCEDURE — 93244 EXT ECG>48HR<7D REV&INTERPJ: CPT | Performed by: INTERNAL MEDICINE

## 2021-12-01 NOTE — TELEPHONE ENCOUNTER
LM ABOUT CAM MONITOR RESULTS, PER WS: NO AF SEEN ON MONITOR, 17 EPISODES OF AT, CONTINUE CURRENT MEDS
Normal for race

## 2021-12-07 DIAGNOSIS — I48.91 ATRIAL FIBRILLATION, UNSPECIFIED TYPE (HCC): ICD-10-CM

## 2021-12-13 ENCOUNTER — TELEPHONE (OUTPATIENT)
Dept: CARDIOLOGY CLINIC | Age: 71
End: 2021-12-13

## 2021-12-13 NOTE — TELEPHONE ENCOUNTER
The patient's spouse called and would like to know if the patient's still needs to be on Eliquis since he doesn't have afib.  [phone]

## 2021-12-13 NOTE — TELEPHONE ENCOUNTER
Yes, he needs to be on anticoagulation.  (As A. fib is mostly intermittent for most of the patients)

## 2021-12-13 NOTE — TELEPHONE ENCOUNTER
Pt's wife questioning if he needs to stay on Eliquis? Pt had isolated episode of AF during hospitalization in 10/2021. [id number] score 2. Pt placed on Eliquis by WS at [address] on 11/1/21. Outpatient monitor showed 17 episodes of AT with longest lasting 2.5 min, but no AF.

## 2021-12-27 ENCOUNTER — TELEPHONE (OUTPATIENT)
Dept: WOUND CARE | Age: 71
End: 2021-12-27

## 2022-01-07 ENCOUNTER — HOSPITAL ENCOUNTER (OUTPATIENT)
Dept: WOUND CARE | Age: 72
Discharge: HOME | End: 2022-01-07
Payer: MEDICARE

## 2022-01-07 VITALS
WEIGHT: 204 LBS | OXYGEN SATURATION: 97 % | BODY MASS INDEX: 29.2 KG/M2 | HEIGHT: 70 IN | SYSTOLIC BLOOD PRESSURE: 108 MMHG | RESPIRATION RATE: 30 BRPM | HEART RATE: 76 BPM | TEMPERATURE: 97.5 F | DIASTOLIC BLOOD PRESSURE: 76 MMHG

## 2022-01-07 DIAGNOSIS — L89.153 STAGE III PRESSURE ULCER OF SACRAL REGION (HCC): ICD-10-CM

## 2022-01-07 DIAGNOSIS — S21.102A OPEN WOUND OF LEFT CHEST WALL, INITIAL ENCOUNTER: ICD-10-CM

## 2022-01-07 PROBLEM — C34.12 SQUAMOUS CELL CARCINOMA OF BRONCHUS IN LEFT UPPER LOBE (HCC): Status: ACTIVE | Noted: 2022-01-07

## 2022-01-07 PROBLEM — S21.109A OPEN WOUND OF CHEST WALL: Status: ACTIVE | Noted: 2022-01-07

## 2022-01-07 PROCEDURE — 99214 OFFICE O/P EST MOD 30 MIN: CPT

## 2022-01-07 PROCEDURE — 11042 DBRDMT SUBQ TIS 1ST 20SQCM/<: CPT

## 2022-01-07 PROCEDURE — 11042 DBRDMT SUBQ TIS 1ST 20SQCM/<: CPT | Performed by: SPECIALIST

## 2022-01-07 PROCEDURE — 99202 OFFICE O/P NEW SF 15 MIN: CPT | Performed by: SPECIALIST

## 2022-01-07 RX ORDER — SODIUM CHLORIDE 9 MG/ML
INJECTION, SOLUTION INTRAVENOUS CONTINUOUS
OUTPATIENT
Start: 2022-01-07

## 2022-01-07 RX ORDER — ONDANSETRON 2 MG/ML
8 INJECTION INTRAMUSCULAR; INTRAVENOUS
OUTPATIENT
Start: 2022-01-07

## 2022-01-07 RX ORDER — DIPHENHYDRAMINE HYDROCHLORIDE 50 MG/ML
50 INJECTION INTRAMUSCULAR; INTRAVENOUS
OUTPATIENT
Start: 2022-01-07

## 2022-01-07 RX ORDER — ACETAMINOPHEN 325 MG/1
650 TABLET ORAL
OUTPATIENT
Start: 2022-01-07

## 2022-01-07 NOTE — PLAN OF CARE
[address]:     [employer] [id number] [address] p: [phone] f: [phone]     Ordering Center: The [address] [place] [address]. Aracely Miranda    Patient Information:      Lilian Troy  [address] [address] [address]   [phone]   : 1950  AGE: 73 y.o. GENDER: male   TODAYS DATE:  2022    Insurance:      PRIMARY INSURANCE:  Plan: MEDICARE PART A AND B  Coverage: MEDICARE  Effective Date: 2017  [id number] - (Medicare)    SECONDARY INSURANCE:  Plan: BCBS - [place] FEDERAL  Coverage: BCBS  Effective Date: 2019  [unfilled]    [unfilled]   [unfilled]     Patient Wound Information:      No diagnosis found.     WOUNDS REQUIRING DRESSING SUPPLIES:     Wound 22 Axilla Left #1 (Active)   Wound Image   22   Wound Etiology Other 22   Wound Cleansed Cleansed with saline 2230   Wound Length (cm) 0.6 cm 2230   Wound Width (cm) 0.6 cm 22   Wound Depth (cm) 0.3 cm 22   Wound Surface Area (cm^2) 0.36 cm^2 2230   Wound Volume (cm^3) 0.108 cm^3 2230   Post-Procedure Length (cm) 0.7 cm 2240   Post-Procedure Width (cm) 0.7 cm 2240   Post-Procedure Depth (cm) 0.5 cm 2240   Post-Procedure Surface Area (cm^2) 0.49 cm^2 22   Post-Procedure Volume (cm^3) 0.245 cm^3 2240   Distance Tunneling (cm) 0 cm 22   Undermining Maxium Distance (cm) 0 22   Wound Assessment Slough 22   Drainage Amount None 22   Odor None 22   Nedra-wound Assessment Intact;Dry/flaky 22   Margins Defined edges 22   Number of days: 0       Wound 22 Coccyx #2 (Active)   Wound Image   22   Wound Etiology Pressure Stage  3 22   Wound Cleansed Cleansed with saline 01/07/22 0930   Wound Length (cm) 1 cm 01/07/22 0930   Wound Width (cm) 1 cm 01/07/22 0930   Wound Depth (cm) 1 cm 01/07/22 0930   Wound Surface Area (cm^2) 1 cm^2 01/07/22 0930   Wound Volume (cm^3) 1 cm^3 01/07/22 0930   Post-Procedure Length (cm) 1.1 cm 01/07/22 0940   Post-Procedure Width (cm) 1.1 cm 01/07/22 0940   Post-Procedure Depth (cm) 1.3 cm 01/07/22 0940   Post-Procedure Surface Area (cm^2) 1.21 cm^2 01/07/22 0940   Post-Procedure Volume (cm^3) 1.573 cm^3 01/07/22 0940   Distance Tunneling (cm) 0 cm 01/07/22 0930   Undermining Maxium Distance (cm) 0 01/07/22 0930   Wound Assessment Pink/red;Fibrin 01/07/22 0930   Drainage Amount None 01/07/22 0930   Odor None 01/07/22 0930   Nedra-wound Assessment Hyperpigmented;Dry/flaky 01/07/22 0930   Margins Defined edges;Epibole (rolled edges) 01/07/22 0930   Number of days: 0     Incision 10/23/21 Chest Lateral;Right;Upper (Active)   Number of days: 76       Supplies Requested :      WOUND #: 1   PRIMARY DRESSING:  Iodoform packing strip - 1/4in   Cover with 4E3AB silicone border dressing     FREQUENCY OF DRESSING CHANGES:  Three times per week       WOUND #: 2   PRIMARY DRESSING:  Iodoform packing strip - 1/4in   Cover and Secure with:  Other silicone border sacral heart     FREQUENCY OF DRESSING CHANGES:  Three times per week       ADDITIONAL ITEMS:  [] Gloves Small  [] Gloves Medium [] Gloves Large [] Gloves XLarge  [] Tape 1\" [x] Tape 2\" [] Tape 3\"  [] Medipore Tape  [x] Saline  [] Skin Prep   [] Adhesive Remover   [x] Cotton Tip Applicators   [x] Other: 4x4 gauze    Patient Wound(s) Debrided: [x] Yes   [] No    Debridement Date: 1/7/2022    Debribement Type: Excisional/Sharp    Patient currently being seen by Home Health: [] Yes   [x] No    Duration for needed supplies:  []15  []30  []60  [x]90 Days    Provider Information:      PROVIDER'S NAME/NPI: Kishore Smith MD,  NPI: [id number]    I give permission to coordinate the care for this patient   assisting with verbal orders: Gladys Vance RN 1/7/2022
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
## 2022-01-08 ENCOUNTER — HOSPITAL ENCOUNTER (OUTPATIENT)
Dept: ONCOLOGY | Age: 72
Setting detail: INFUSION SERIES
Discharge: HOME | End: 2022-01-08
Payer: MEDICARE

## 2022-01-08 VITALS
DIASTOLIC BLOOD PRESSURE: 63 MMHG | TEMPERATURE: 98.6 F | HEART RATE: 96 BPM | SYSTOLIC BLOOD PRESSURE: 90 MMHG | OXYGEN SATURATION: 98 % | RESPIRATION RATE: 20 BRPM

## 2022-01-08 DIAGNOSIS — C34.12 SQUAMOUS CELL CARCINOMA OF BRONCHUS IN LEFT UPPER LOBE (HCC): Primary | ICD-10-CM

## 2022-01-08 LAB
ABO/RH: NORMAL
ANTIBODY SCREEN: NORMAL
BLOOD BANK DISPENSE STATUS: NORMAL
BLOOD BANK PRODUCT CODE: NORMAL
BPU ID: NORMAL
DESCRIPTION BLOOD BANK: NORMAL

## 2022-01-08 PROCEDURE — 86923 COMPATIBILITY TEST ELECTRIC: CPT

## 2022-01-08 PROCEDURE — 36591 DRAW BLOOD OFF VENOUS DEVICE: CPT

## 2022-01-08 PROCEDURE — 2580000003 HC RX 258: Performed by: INTERNAL MEDICINE

## 2022-01-08 PROCEDURE — 86900 BLOOD TYPING SEROLOGIC ABO: CPT

## 2022-01-08 PROCEDURE — P9016 RBC LEUKOCYTES REDUCED: HCPCS

## 2022-01-08 PROCEDURE — 86901 BLOOD TYPING SEROLOGIC RH(D): CPT

## 2022-01-08 PROCEDURE — 86850 RBC ANTIBODY SCREEN: CPT

## 2022-01-08 PROCEDURE — 36430 TRANSFUSION BLD/BLD COMPNT: CPT

## 2022-01-08 PROCEDURE — 6370000000 HC RX 637 (ALT 250 FOR IP): Performed by: INTERNAL MEDICINE

## 2022-01-08 RX ORDER — ONDANSETRON 2 MG/ML
8 INJECTION INTRAMUSCULAR; INTRAVENOUS
OUTPATIENT
Start: 2022-01-08

## 2022-01-08 RX ORDER — ACETAMINOPHEN 325 MG/1
650 TABLET ORAL ONCE
Status: COMPLETED | OUTPATIENT
Start: 2022-01-08 | End: 2022-01-08

## 2022-01-08 RX ORDER — SODIUM CHLORIDE 9 MG/ML
INJECTION, SOLUTION INTRAVENOUS CONTINUOUS
OUTPATIENT
Start: 2022-01-08

## 2022-01-08 RX ORDER — ACETAMINOPHEN 325 MG/1
650 TABLET ORAL ONCE
Status: CANCELLED | OUTPATIENT
Start: 2022-01-08 | End: 2022-01-08

## 2022-01-08 RX ORDER — DIPHENHYDRAMINE HCL 25 MG
25 TABLET ORAL ONCE
Status: CANCELLED | OUTPATIENT
Start: 2022-01-08 | End: 2022-01-08

## 2022-01-08 RX ORDER — SODIUM CHLORIDE 9 MG/ML
20 INJECTION, SOLUTION INTRAVENOUS CONTINUOUS
Status: CANCELLED | OUTPATIENT
Start: 2022-01-08

## 2022-01-08 RX ORDER — SODIUM CHLORIDE 9 MG/ML
25 INJECTION, SOLUTION INTRAVENOUS PRN
Status: CANCELLED | OUTPATIENT
Start: 2022-01-08

## 2022-01-08 RX ORDER — SODIUM CHLORIDE 0.9 % (FLUSH) 0.9 %
5-40 SYRINGE (ML) INJECTION PRN
Status: CANCELLED | OUTPATIENT
Start: 2022-01-08

## 2022-01-08 RX ORDER — SODIUM CHLORIDE 0.9 % (FLUSH) 0.9 %
5-40 SYRINGE (ML) INJECTION PRN
Status: DISCONTINUED | OUTPATIENT
Start: 2022-01-08 | End: 2022-01-09 | Stop reason: HOSPADM

## 2022-01-08 RX ORDER — SODIUM CHLORIDE 9 MG/ML
25 INJECTION, SOLUTION INTRAVENOUS PRN
Status: DISCONTINUED | OUTPATIENT
Start: 2022-01-08 | End: 2022-01-09 | Stop reason: HOSPADM

## 2022-01-08 RX ORDER — DIPHENHYDRAMINE HCL 25 MG
25 TABLET ORAL ONCE
Status: COMPLETED | OUTPATIENT
Start: 2022-01-08 | End: 2022-01-08

## 2022-01-08 RX ORDER — DIPHENHYDRAMINE HYDROCHLORIDE 50 MG/ML
50 INJECTION INTRAMUSCULAR; INTRAVENOUS
OUTPATIENT
Start: 2022-01-08

## 2022-01-08 RX ORDER — ACETAMINOPHEN 325 MG/1
650 TABLET ORAL
OUTPATIENT
Start: 2022-01-08

## 2022-01-08 RX ORDER — SODIUM CHLORIDE 9 MG/ML
20 INJECTION, SOLUTION INTRAVENOUS CONTINUOUS
Status: DISCONTINUED | OUTPATIENT
Start: 2022-01-08 | End: 2022-01-09 | Stop reason: HOSPADM

## 2022-01-08 RX ADMIN — DIPHENHYDRAMINE HYDROCHLORIDE 25 MG: 25 TABLET ORAL at 08:51

## 2022-01-08 RX ADMIN — ACETAMINOPHEN 650 MG: 325 TABLET ORAL at 08:51

## 2022-01-08 RX ADMIN — SODIUM CHLORIDE 20 ML/HR: 9 INJECTION, SOLUTION INTRAVENOUS at 08:20

## 2022-01-08 ASSESSMENT — PAIN SCALES - GENERAL: PAINLEVEL_OUTOF10: 0

## 2022-01-13 ENCOUNTER — HOSPITAL ENCOUNTER (OUTPATIENT)
Dept: CT IMAGING | Age: 72
Discharge: HOME | End: 2022-01-13
Payer: MEDICARE

## 2022-01-13 DIAGNOSIS — R06.00 DYSPNEA, UNSPECIFIED TYPE: ICD-10-CM

## 2022-01-13 LAB
GFR AFRICAN AMERICAN: >60
GFR NON-AFRICAN AMERICAN: >60
PERFORMED ON: NORMAL
POC CREATININE: 0.9 MG/DL (ref 0.8–1.3)
POC SAMPLE TYPE: NORMAL

## 2022-01-13 PROCEDURE — 71275 CT ANGIOGRAPHY CHEST: CPT

## 2022-01-13 PROCEDURE — 82565 ASSAY OF CREATININE: CPT

## 2022-01-13 PROCEDURE — 6360000004 HC RX CONTRAST MEDICATION: Performed by: INTERNAL MEDICINE

## 2022-01-13 RX ADMIN — IOPAMIDOL 80 ML: 755 INJECTION, SOLUTION INTRAVENOUS at 09:14

## 2022-01-22 ENCOUNTER — HOSPITAL ENCOUNTER (OUTPATIENT)
Dept: MRI IMAGING | Age: 72
Discharge: HOME | End: 2022-01-22
Payer: MEDICARE

## 2022-01-22 DIAGNOSIS — C34.12 SQUAMOUS CELL CARCINOMA OF BRONCHUS IN LEFT UPPER LOBE (HCC): ICD-10-CM

## 2022-01-22 DIAGNOSIS — C79.31 METASTASIS TO BRAIN (HCC): ICD-10-CM

## 2022-01-22 PROCEDURE — A9577 INJ MULTIHANCE: HCPCS | Performed by: RADIOLOGY

## 2022-01-22 PROCEDURE — 70553 MRI BRAIN STEM W/O & W/DYE: CPT

## 2022-01-22 PROCEDURE — 6360000004 HC RX CONTRAST MEDICATION: Performed by: RADIOLOGY

## 2022-01-22 RX ADMIN — GADOBENATE DIMEGLUMINE 20 ML: 529 INJECTION, SOLUTION INTRAVENOUS at 08:41
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
## 2022-01-27 ENCOUNTER — OFFICE VISIT (OUTPATIENT)
Dept: CARDIOLOGY CLINIC | Age: 72
End: 2022-01-27
Payer: MEDICARE

## 2022-01-27 VITALS
HEIGHT: 70 IN | DIASTOLIC BLOOD PRESSURE: 60 MMHG | BODY MASS INDEX: 28.35 KG/M2 | HEART RATE: 130 BPM | SYSTOLIC BLOOD PRESSURE: 98 MMHG | WEIGHT: 198 LBS

## 2022-01-27 DIAGNOSIS — I48.0 PAROXYSMAL ATRIAL FIBRILLATION (HCC): Primary | ICD-10-CM

## 2022-01-27 DIAGNOSIS — I10 BENIGN ESSENTIAL HTN: ICD-10-CM

## 2022-01-27 PROCEDURE — 93000 ELECTROCARDIOGRAM COMPLETE: CPT | Performed by: NURSE PRACTITIONER

## 2022-01-27 PROCEDURE — G8417 CALC BMI ABV UP PARAM F/U: HCPCS | Performed by: NURSE PRACTITIONER

## 2022-01-27 PROCEDURE — G8484 FLU IMMUNIZE NO ADMIN: HCPCS | Performed by: NURSE PRACTITIONER

## 2022-01-27 PROCEDURE — 1123F ACP DISCUSS/DSCN MKR DOCD: CPT | Performed by: NURSE PRACTITIONER

## 2022-01-27 PROCEDURE — 99214 OFFICE O/P EST MOD 30 MIN: CPT | Performed by: NURSE PRACTITIONER

## 2022-01-27 PROCEDURE — 3017F COLORECTAL CA SCREEN DOC REV: CPT | Performed by: NURSE PRACTITIONER

## 2022-01-27 PROCEDURE — 4040F PNEUMOC VAC/ADMIN/RCVD: CPT | Performed by: NURSE PRACTITIONER

## 2022-01-27 PROCEDURE — 1036F TOBACCO NON-USER: CPT | Performed by: NURSE PRACTITIONER

## 2022-01-27 PROCEDURE — G8427 DOCREV CUR MEDS BY ELIG CLIN: HCPCS | Performed by: NURSE PRACTITIONER

## 2022-01-27 RX ORDER — DIGOXIN 125 MCG
125 TABLET ORAL DAILY
Qty: 30 TABLET | Refills: 5 | Status: SHIPPED | OUTPATIENT
Start: 2022-01-27 | End: 2022-01-31 | Stop reason: SDUPTHER

## 2022-01-31 ENCOUNTER — TELEPHONE (OUTPATIENT)
Dept: CARDIOLOGY CLINIC | Age: 72
End: 2022-01-31

## 2022-01-31 ENCOUNTER — NURSE ONLY (OUTPATIENT)
Dept: CARDIOLOGY CLINIC | Age: 72
End: 2022-01-31
Payer: MEDICARE

## 2022-01-31 ENCOUNTER — OFFICE VISIT (OUTPATIENT)
Dept: CARDIOLOGY CLINIC | Age: 72
End: 2022-01-31
Payer: MEDICARE

## 2022-01-31 VITALS
DIASTOLIC BLOOD PRESSURE: 60 MMHG | HEIGHT: 70 IN | WEIGHT: 190 LBS | BODY MASS INDEX: 27.2 KG/M2 | SYSTOLIC BLOOD PRESSURE: 82 MMHG | HEART RATE: 86 BPM

## 2022-01-31 VITALS
WEIGHT: 190 LBS | DIASTOLIC BLOOD PRESSURE: 60 MMHG | BODY MASS INDEX: 27.2 KG/M2 | HEIGHT: 70 IN | SYSTOLIC BLOOD PRESSURE: 82 MMHG | HEART RATE: 86 BPM

## 2022-01-31 DIAGNOSIS — I48.0 PAROXYSMAL ATRIAL FIBRILLATION (HCC): Primary | ICD-10-CM

## 2022-01-31 DIAGNOSIS — Z79.01 ON CONTINUOUS ORAL ANTICOAGULATION: Primary | ICD-10-CM

## 2022-01-31 DIAGNOSIS — R06.83 SNORING: ICD-10-CM

## 2022-01-31 DIAGNOSIS — G47.39 SLEEP APNEA-LIKE BEHAVIOR: ICD-10-CM

## 2022-01-31 DIAGNOSIS — G47.30 SLEEP APNEA, UNSPECIFIED TYPE: ICD-10-CM

## 2022-01-31 DIAGNOSIS — I48.0 PAROXYSMAL ATRIAL FIBRILLATION (HCC): ICD-10-CM

## 2022-01-31 DIAGNOSIS — I10 ESSENTIAL HYPERTENSION: ICD-10-CM

## 2022-01-31 DIAGNOSIS — I48.0 PAROXYSMAL A-FIB (HCC): Primary | ICD-10-CM

## 2022-01-31 PROCEDURE — G8427 DOCREV CUR MEDS BY ELIG CLIN: HCPCS | Performed by: NURSE PRACTITIONER

## 2022-01-31 PROCEDURE — 1036F TOBACCO NON-USER: CPT | Performed by: NURSE PRACTITIONER

## 2022-01-31 PROCEDURE — G8484 FLU IMMUNIZE NO ADMIN: HCPCS | Performed by: NURSE PRACTITIONER

## 2022-01-31 PROCEDURE — 1123F ACP DISCUSS/DSCN MKR DOCD: CPT | Performed by: NURSE PRACTITIONER

## 2022-01-31 PROCEDURE — 99215 OFFICE O/P EST HI 40 MIN: CPT | Performed by: NURSE PRACTITIONER

## 2022-01-31 PROCEDURE — 93242 EXT ECG>48HR<7D RECORDING: CPT | Performed by: INTERNAL MEDICINE

## 2022-01-31 PROCEDURE — 93000 ELECTROCARDIOGRAM COMPLETE: CPT | Performed by: NURSE PRACTITIONER

## 2022-01-31 PROCEDURE — G8417 CALC BMI ABV UP PARAM F/U: HCPCS | Performed by: NURSE PRACTITIONER

## 2022-01-31 PROCEDURE — 3017F COLORECTAL CA SCREEN DOC REV: CPT | Performed by: NURSE PRACTITIONER

## 2022-01-31 PROCEDURE — 4040F PNEUMOC VAC/ADMIN/RCVD: CPT | Performed by: NURSE PRACTITIONER

## 2022-01-31 RX ORDER — DIGOXIN 250 MCG
250 TABLET ORAL DAILY
Qty: 90 TABLET | Refills: 3 | Status: SHIPPED | OUTPATIENT
Start: 2022-01-31

## 2022-01-31 NOTE — PROGRESS NOTES
[address]   Electrophysiology  Office Visit  Date: 1/31/2022    Chief Complaint   Patient presents with    Atrial Fibrillation    Hypotension    Shortness of Breath     when walking 10 ft       Cardiac HX: Lo Rossi is a 73 y.o. man with a h/o HTN, hypothyroidism, prostate CA, who presented with suspected seizure (10/25/2021), noted to have an isolated event of AF on telemetry, found to have a lung mass with brain mets, bronch w/ bx showed small cell lung CA, s/p chemo, 1 week CAM (11/1/202111/6/2021) showed an average HR 63 (49102), 17 episodes of AT, longest lasting 2-1/2 minutes no AF seen, seen last week in AF with elevated HR. Interval History/HPI: Patient is here for follow-up for paroxysmal atrial fibrillation. He had seen Cam Banks NP last Thursday and was noted to be in AF/RVR. His blood pressure was soft at that time he was placed on digoxin 0.125 mg. He is here today for an EKG however his heart rate is in the 130s still. He does not feel heart racing or palpitations. He had brought his blood pressure log with him as well as a heart rate log. His heart rate appears to run between 42 beats a minute and 100 bpm.  His blood pressure machine may not be able to calculate his heart rate in atrial fibrillation. His blood pressure has been running anywhere from 71//87. He denies any chest pain, shortness of breath, PND, orthopnea or lower extremity edema. He does get some lightheadedness when he goes from sitting to standing however he has had no syncopal events. He remains on Eliquis 5 mg twice a day and states he missed 2 doses last Thursday. He has not called to schedule his sleep study yet.     Home medications:   Current Outpatient Medications on File Prior to Visit   Medication Sig Dispense Refill    digoxin (LANOXIN) 125 MCG tablet Take 1 tablet by mouth daily 30 tablet 5    apixaban (ELIQUIS) 5 MG TABS tablet Take 1 tablet by mouth 2 times daily 60 tablet 5  levETIRAcetam (KEPPRA) 1000 MG tablet Take 1 tablet by mouth 2 times daily 60 tablet 1    ondansetron (ZOFRAN-ODT) 4 MG disintegrating tablet Take 1 tablet by mouth every 8 hours as needed for Nausea or Vomiting 30 tablet 0    metoprolol tartrate (LOPRESSOR) 25 MG tablet Take 0.5 tablets by mouth 2 times daily 60 tablet 1    allopurinol (ZYLOPRIM) 300 MG tablet Take 1 tablet by mouth daily 30 tablet 1    levothyroxine (SYNTHROID) 100 MCG tablet Take 1 tablet by mouth Daily 30 tablet 1    pantoprazole (PROTONIX) 40 MG tablet Take 1 tablet by mouth every morning (before breakfast) 30 tablet 1    aspirin 81 MG EC tablet Take 81 mg by mouth daily       No current facility-administered medications on file prior to visit. Past Medical History:   Diagnosis Date    Arthritis     left shoulder    Cancer ([address]     Diverticula of colon     Fx ankle     LEFT    Hypertension     Hypothyroid     Lung cancer metastatic to brain ([address]     Malignant neoplasm of bronchus of upper lobe ([address]     Pneumonia 06/24/2021    Prostate enlargement     BENIGN    Seizure ([address] 10/19/2021        Past Surgical History:   Procedure Laterality Date    BRONCHOSCOPY N/A 10/21/2021    BRONCHOSCOPY ENDOBRONCHIAL ULTRASOUND performed by Torey Venegas MD at [address]  10/21/2021    BRONCHOSCOPY BIOPSY BRONCHUS performed by Torey Venegas MD at [address]  10/21/2021    BRONCHOSCOPY BRUSHINGS performed by Torey Venegas MD at [address] [place] [address] Right [address] [place] [address]    PORT SURGERY N/A 10/23/2021    RIGHT SUBCLAVIAN PORT A CATH INSERTION WITH FLUOROSCOPY GUIDANCE performed by Fernando Armenta MD [facility] 1  10/13/2021       Allergies   Allergen Reactions    Cephalexin Rash       Social History:  Reviewed. reports that he quit smoking about 20 years ago. He has never used smokeless tobacco. He reports previous alcohol use. He reports current drug use. Frequency: 1.00 time per week. Drug: Marijuana Gelene Chasten). Family History:  Reviewed. family history includes Heart Attack in his sister. Review of System:    · Constitutional: No fevers, chills. · Eyes: No visual changes or diplopia. No scleral icterus. · ENT: No Headaches. No mouth sores or sore throat. · Cardiovascular: No for chest pain, Yes for dyspnea on exertion, No for palpitations or No for loss of consciousness. No cough, hemoptysis, No for pleuritic pain, or phlebitis. · Respiratory: No for cough or wheezing. No hematemesis. · Gastrointestinal: No abdominal pain, blood in stools. · Genitourinary: No dysuria, or hematuria. · Musculoskeletal: No gait disturbance,    · Integumentary: No rash or pruritis. · Neurological: No headache, change in muscle strength, numbness or tingling. · Psychiatric: No anxiety, or depression. · Endocrine: No temperature intolerance. No excessive thirst, fluid intake, or urination. · Hem/Lymph: No abnormal bruising or bleeding, blood clots or swollen lymph nodes. · Allergic/Immunologic: No nasal congestion or hives. Physical Examination:  Vitals:    01/31/22 0924   BP: 82/60   Pulse: 86         Wt Readings from Last 3 Encounters:   01/31/22 190 lb (86.2 kg)   01/31/22 190 lb (86.2 kg)   01/27/22 198 lb (89.8 kg)       · Constitutional: Oriented. No distress. · Head: Normocephalic and atraumatic. · Mouth/Throat: Oropharynx is clear and moist.   · Eyes: Conjunctivae clear without jaunduice. PERRL. · Neck: Neck supple. No rigidity. No JVD present. · Cardiovascular: Normal rate, regular rhythm, S1&S2. · Pulmonary/Chest: Bilateral respiratory sounds. No wheezes, No rhonchi. · Abdominal: Soft. Bowel sounds present. No distension, No tenderness. · Musculoskeletal: No tenderness. No edema    · Lymphadenopathy: Has no cervical adenopathy. · Neurological: Alert and oriented.  Cranial nerve appears intact, No Gross deficit   · Skin: Skin is warm and dry. No rash noted. · Psychiatric: Has a normal mood, affect and behavior     Labs:  Reviewed. No results for input(s): NA, K, CL, CO2, PHOS, BUN, CREATININE, CA in the last 72 hours. Invalid input(s):  TSH  No results for input(s): WBC, HGB, HCT, MCV, PLT in the last 72 hours. Lab Results   Component Value Date    CKTOTAL 106 10/20/2021    TROPONINI <0.01 10/20/2021     No results found for: BNP  Lab Results   Component Value Date    PROTIME 10.8 10/23/2021    PROTIME 10.8 10/21/2021    INR 0.96 10/23/2021    INR 0.96 10/21/2021     No results found for: CHOL, HDL, TRIG    ECG: Personally reviewed: AF, , , QTc 460    ECHO: 10/25/2021  Summary   Left ventricular cavity size is normal with normal left ventricular wall   thickness. Overall left ventricular systolic function appears normal with an ejection   fraction of 50%. Mild to moderate mitral regurgitation   Estimated pulmonary artery systolic pressure is at 39 mmHg assuming a right   atrial pressure of 3 mmHg. Stress Test: N/A    Cardiac Angiography: N/A    Problem List:   Patient Active Problem List    Diagnosis Date Noted    Stage III pressure ulcer of sacral region ([address] 01/07/2022    Open wound of chest wall 01/07/2022    Squamous cell carcinoma of bronchus in left upper lobe ([facility] 01/07/2022    Lesion of lung 10/21/2021    First time seizure ([facility] 10/21/2021    Hypothyroid     Intracranial mass 10/20/2021        Assessment:   No diagnosis found.        Cardiac HX: Imtiaz Ordoñez is a 73 y.o. man with a h/o HTN, hypothyroidism, prostate CA, who presented with suspected seizure (10/25/2021), noted to have an isolated event of AF on telemetry, found to have a lung mass with brain mets, bronch w/ bx showed small cell lung CA, s/p chemo, 1 week CAM (11/1/202111/6/2021) showed an average HR 63 (49102), 17 episodes of AT, longest lasting 2-1/2 minutes no AF seen, seen last week in AF with elevated HR.   [id number] 3. TSH 0.89 (10/24/2021). pAF  - In AF  - On Eliquis 5 mg BID - no s/ bleeding - continue, s/p 1 u PRBC 1 month ago, reinforced not missing any doses, missed 2 doses last Thursday  - On metoprolol 12.5 mg BID - low BP limiting increasing med dose -80/60 in the office today  - Dig 0.125 - will increase to 0.25 mg QD  - CAM (11/1/2021 11/6/2021) showed average HR 63 (49102) 17 episodes AT, longest lasting 2-1/2 minutes, no AF seen  - Will repeat 1 week CAM to see if patient goes in and out of AF  - Will discuss JAYLYN/DCCV with Dr. Tosin Medina vs meds  - Echo showed EF 50%, LA 4.7 cm  - Sleep study - patient to call and schedule  - Discussed risk factors for AF including but not limited to caffeine use, tobacco use, diet, exercise, EtOH use  - Reinforced importance of medication adherence  - Follow-up in 6 weeks  - ECG ordered and results personally reviewed      HTN  - BP soft side at times  - On metoprolol 12.5 mg BID     EF of 19%  No known systolic HF  No known CAD  Anticoagulation for AF    No tobacco use      All questions and concerns were addressed to the patient/family. Alternatives to my treatment were discussed. The note was completed using EMR. Every effort was made to ensure accuracy; however, inadvertent computerized transcription errors may be present. Patient received education regarding their diagnosis, treatment and medications while in the office today. [place] [facility]      I  have spent 40 minutes in care of the patient including direct face to face time, chart preparation, reviewing diagnostic testing, other provider notes and coordinating patient care.

## 2022-01-31 NOTE — TELEPHONE ENCOUNTER
Patient unable to get into a sleep study for 1 month and is asking for another sleep study order for annelise.    please call patient to advise at Pt # [phone]

## 2022-02-01 ENCOUNTER — TELEPHONE (OUTPATIENT)
Dept: CARDIOLOGY CLINIC | Age: 72
End: 2022-02-01

## 2022-02-01 NOTE — TELEPHONE ENCOUNTER
This patient is followed by Gamaliel Zamudio and Cole Jensen but I will forward a new order for sleep study at [facility].      Bia Daly

## 2022-02-01 NOTE — TELEPHONE ENCOUNTER
Spoke with patient and patient's wife. Gave scheduling number to [facility] sleep center.

## 2022-02-01 NOTE — TELEPHONE ENCOUNTER
Called and spoke with patient. Patient is in AF with RVR at a rate of 138 bpm.  His blood pressures been soft and we have not been able to increase the metoprolol or add diltiazem. He is on dig 0.25 mg daily. He had a CAM placed on 1/31/2022. Reviewed with Dr. Pavan Acuña and he suggested we do a JAYLYN/DCCV to get him back in regular rhythm. I have asked the patient to drop off his CAM on Monday. This will be available for review on Tuesday and if he is in % of the time then we will schedule him for a JAYLYN cardioversion hopefully by the end of the week with Dr. Jonathan Bradley or Dr. Leatha Lanes. Reviewed procedure with patient including risks and complications and patient is agreeable to proceeding.

## 2022-02-08 ENCOUNTER — HOSPITAL ENCOUNTER (OUTPATIENT)
Dept: CT IMAGING | Age: 72
Discharge: HOME | End: 2022-02-08
Payer: MEDICARE

## 2022-02-08 DIAGNOSIS — C34.12 SQUAMOUS CELL CARCINOMA OF BRONCHUS IN LEFT UPPER LOBE (HCC): ICD-10-CM

## 2022-02-08 PROCEDURE — 93244 EXT ECG>48HR<7D REV&INTERPJ: CPT | Performed by: INTERNAL MEDICINE

## 2022-02-08 PROCEDURE — 74177 CT ABD & PELVIS W/CONTRAST: CPT

## 2022-02-08 PROCEDURE — 6360000004 HC RX CONTRAST MEDICATION: Performed by: INTERNAL MEDICINE

## 2022-02-08 PROCEDURE — 6360000002 HC RX W HCPCS: Performed by: RADIOLOGY

## 2022-02-08 RX ORDER — HEPARIN SODIUM (PORCINE) LOCK FLUSH IV SOLN 100 UNIT/ML 100 UNIT/ML
100 SOLUTION INTRAVENOUS PRN
Status: DISCONTINUED | OUTPATIENT
Start: 2022-02-08 | End: 2022-02-09 | Stop reason: HOSPADM

## 2022-02-08 RX ADMIN — IOPAMIDOL 80 ML: 755 INJECTION, SOLUTION INTRAVENOUS at 11:12

## 2022-02-08 RX ADMIN — Medication 100 UNITS: at 11:13

## 2022-02-08 RX ADMIN — IOHEXOL 50 ML: 240 INJECTION, SOLUTION INTRATHECAL; INTRAVASCULAR; INTRAVENOUS; ORAL at 11:12

## 2022-02-09 ENCOUNTER — PREP FOR PROCEDURE (OUTPATIENT)
Dept: CARDIOLOGY CLINIC | Age: 72
End: 2022-02-09

## 2022-02-09 DIAGNOSIS — I48.0 PAROXYSMAL ATRIAL FIBRILLATION (HCC): ICD-10-CM

## 2022-02-09 RX ORDER — SODIUM CHLORIDE 0.9 % (FLUSH) 0.9 %
5-40 SYRINGE (ML) INJECTION PRN
Status: CANCELLED | OUTPATIENT
Start: 2022-02-09

## 2022-02-09 RX ORDER — SODIUM CHLORIDE 9 MG/ML
25 INJECTION, SOLUTION INTRAVENOUS PRN
Status: CANCELLED | OUTPATIENT
Start: 2022-02-09

## 2022-02-09 RX ORDER — SODIUM CHLORIDE 9 MG/ML
INJECTION, SOLUTION INTRAVENOUS CONTINUOUS
Status: CANCELLED | OUTPATIENT
Start: 2022-02-09

## 2022-02-09 RX ORDER — SODIUM CHLORIDE 0.9 % (FLUSH) 0.9 %
5-40 SYRINGE (ML) INJECTION EVERY 12 HOURS SCHEDULED
Status: CANCELLED | OUTPATIENT
Start: 2022-02-09

## 2022-02-14 ENCOUNTER — HOSPITAL ENCOUNTER (OUTPATIENT)
Dept: CARDIAC CATH/INVASIVE PROCEDURES | Age: 72
Setting detail: OUTPATIENT SURGERY
Discharge: HOME | End: 2022-02-14
Attending: INTERNAL MEDICINE | Admitting: INTERNAL MEDICINE
Payer: MEDICARE

## 2022-02-14 ENCOUNTER — ANESTHESIA (OUTPATIENT)
Dept: CARDIAC CATH/INVASIVE PROCEDURES | Age: 72
End: 2022-02-14
Payer: MEDICARE

## 2022-02-14 ENCOUNTER — ANESTHESIA EVENT (OUTPATIENT)
Dept: CARDIAC CATH/INVASIVE PROCEDURES | Age: 72
End: 2022-02-14
Payer: MEDICARE

## 2022-02-14 VITALS — SYSTOLIC BLOOD PRESSURE: 101 MMHG | DIASTOLIC BLOOD PRESSURE: 63 MMHG | OXYGEN SATURATION: 100 %

## 2022-02-14 VITALS — BODY MASS INDEX: 25.05 KG/M2 | TEMPERATURE: 97.6 F | WEIGHT: 175 LBS | HEIGHT: 70 IN

## 2022-02-14 LAB
ANION GAP SERPL CALCULATED.3IONS-SCNC: 13 MMOL/L (ref 3–16)
BUN BLDV-MCNC: 14 MG/DL (ref 7–20)
CALCIUM SERPL-MCNC: 9.7 MG/DL (ref 8.3–10.6)
CHLORIDE BLD-SCNC: 100 MMOL/L (ref 99–110)
CO2: 23 MMOL/L (ref 21–32)
CREAT SERPL-MCNC: <0.5 MG/DL (ref 0.8–1.3)
EKG ATRIAL RATE: 288 BPM
EKG ATRIAL RATE: 71 BPM
EKG DIAGNOSIS: NORMAL
EKG DIAGNOSIS: NORMAL
EKG P AXIS: 30 DEGREES
EKG P-R INTERVAL: 178 MS
EKG Q-T INTERVAL: 328 MS
EKG Q-T INTERVAL: 348 MS
EKG QRS DURATION: 80 MS
EKG QRS DURATION: 82 MS
EKG QTC CALCULATION (BAZETT): 378 MS
EKG QTC CALCULATION (BAZETT): 383 MS
EKG R AXIS: 23 DEGREES
EKG R AXIS: 68 DEGREES
EKG T AXIS: 15 DEGREES
EKG T AXIS: 52 DEGREES
EKG VENTRICULAR RATE: 71 BPM
EKG VENTRICULAR RATE: 82 BPM
GFR AFRICAN AMERICAN: >60
GFR NON-AFRICAN AMERICAN: >60
GLUCOSE BLD-MCNC: 148 MG/DL (ref 70–99)
INR BLD: 1.17 (ref 0.88–1.12)
POTASSIUM SERPL-SCNC: 5.1 MMOL/L (ref 3.5–5.1)
PROTHROMBIN TIME: 13.3 SEC (ref 9.9–12.7)
SARS-COV-2, NAAT: NOT DETECTED
SODIUM BLD-SCNC: 136 MMOL/L (ref 136–145)

## 2022-02-14 PROCEDURE — 80048 BASIC METABOLIC PNL TOTAL CA: CPT

## 2022-02-14 PROCEDURE — 92960 CARDIOVERSION ELECTRIC EXT: CPT

## 2022-02-14 PROCEDURE — 87635 SARS-COV-2 COVID-19 AMP PRB: CPT

## 2022-02-14 PROCEDURE — 92960 CARDIOVERSION ELECTRIC EXT: CPT | Performed by: INTERNAL MEDICINE

## 2022-02-14 PROCEDURE — 93005 ELECTROCARDIOGRAM TRACING: CPT | Performed by: INTERNAL MEDICINE

## 2022-02-14 PROCEDURE — 2580000003 HC RX 258: Performed by: INTERNAL MEDICINE

## 2022-02-14 PROCEDURE — 3700000000 HC ANESTHESIA ATTENDED CARE

## 2022-02-14 PROCEDURE — 93312 ECHO TRANSESOPHAGEAL: CPT

## 2022-02-14 PROCEDURE — 85610 PROTHROMBIN TIME: CPT

## 2022-02-14 PROCEDURE — 93312 ECHO TRANSESOPHAGEAL: CPT | Performed by: INTERNAL MEDICINE

## 2022-02-14 PROCEDURE — 93320 DOPPLER ECHO COMPLETE: CPT

## 2022-02-14 PROCEDURE — 93010 ELECTROCARDIOGRAM REPORT: CPT | Performed by: INTERNAL MEDICINE

## 2022-02-14 PROCEDURE — 6360000002 HC RX W HCPCS: Performed by: ANESTHESIOLOGY

## 2022-02-14 PROCEDURE — 93325 DOPPLER ECHO COLOR FLOW MAPG: CPT

## 2022-02-14 RX ORDER — SODIUM CHLORIDE 9 MG/ML
INJECTION, SOLUTION INTRAVENOUS CONTINUOUS
Status: DISCONTINUED | OUTPATIENT
Start: 2022-02-14 | End: 2022-02-14 | Stop reason: HOSPADM

## 2022-02-14 RX ORDER — PROPOFOL 10 MG/ML
INJECTION, EMULSION INTRAVENOUS PRN
Status: DISCONTINUED | OUTPATIENT
Start: 2022-02-14 | End: 2022-02-14 | Stop reason: SDUPTHER

## 2022-02-14 RX ORDER — SODIUM CHLORIDE 0.9 % (FLUSH) 0.9 %
5-40 SYRINGE (ML) INJECTION PRN
Status: DISCONTINUED | OUTPATIENT
Start: 2022-02-14 | End: 2022-02-14 | Stop reason: HOSPADM

## 2022-02-14 RX ORDER — SODIUM CHLORIDE 9 MG/ML
25 INJECTION, SOLUTION INTRAVENOUS PRN
Status: DISCONTINUED | OUTPATIENT
Start: 2022-02-14 | End: 2022-02-14 | Stop reason: HOSPADM

## 2022-02-14 RX ORDER — SODIUM CHLORIDE 0.9 % (FLUSH) 0.9 %
5-40 SYRINGE (ML) INJECTION EVERY 12 HOURS SCHEDULED
Status: DISCONTINUED | OUTPATIENT
Start: 2022-02-14 | End: 2022-02-14 | Stop reason: HOSPADM

## 2022-02-14 RX ADMIN — PROPOFOL 50 MG: 10 INJECTION, EMULSION INTRAVENOUS at 11:00

## 2022-02-14 RX ADMIN — SODIUM CHLORIDE: 9 INJECTION, SOLUTION INTRAVENOUS at 10:57

## 2022-02-14 RX ADMIN — PROPOFOL 30 MG: 10 INJECTION, EMULSION INTRAVENOUS at 11:03

## 2022-02-14 NOTE — ANESTHESIA POSTPROCEDURE EVALUATION
Department of Anesthesiology  Postprocedure Note    Patient: Silvio Moore  MRN: [id number]  YOB: 1950  Date of evaluation: 2/14/2022  Time:  11:19 AM     Procedure Summary     Date: 02/14/22 Room / Location: [facility]    Anesthesia Start: 2558 Anesthesia Stop: 1110    Procedure: [facility] CARDIO/JAYLYN W ECHO AND ANES Diagnosis:     Scheduled Providers:  Responsible Provider: Latoya Britt MD    Anesthesia Type: general ASA Status: 3          Anesthesia Type: general    Oliverio Phase I:      Oliverio Phase II:      Last vitals: Reviewed and per EMR flowsheets.        Anesthesia Post Evaluation    Patient location during evaluation: bedside  Level of consciousness: awake and alert  Airway patency: patent  Nausea & Vomiting: no vomiting  Complications: no  Cardiovascular status: blood pressure returned to baseline  Respiratory status: acceptable  Hydration status: euvolemic

## 2022-02-14 NOTE — PROGRESS NOTES
PRE JAYLYN SEDATION     Pre procedural Sedation Assessment Note - JAYLYN    Pre - procedure Diagnosis:  Afib    Planned procedure:  JAYLYN    H&P reviewed   Pernell Roa is a 73 y.o. male with a past medical hx of Afib who presents for JAYLYN DCCV  . Will proceed with JAYLYN. Nursing history and assessment - reviewed    Allergies:   Allergies   Allergen Reactions    Cephalexin Rash       PMH:  Past Medical History:   Diagnosis Date    Arthritis     left shoulder    [facility]     Diverticula of colon     Fx ankle     LEFT    Hypertension     Hypothyroid     Lung cancer metastatic to brain ([facility]     Malignant neoplasm of bronchus of upper lobe ([facility]     Pneumonia 06/24/2021    Prostate enlargement     BENIGN    Seizure ([facility] 10/19/2021       Outpatient Meds:  Medications Prior to Admission: digoxin (LANOXIN) 250 MCG tablet, Take 1 tablet by mouth daily  apixaban (ELIQUIS) 5 MG TABS tablet, Take 1 tablet by mouth 2 times daily  levETIRAcetam (KEPPRA) 1000 MG tablet, Take 1 tablet by mouth 2 times daily  ondansetron (ZOFRAN-ODT) 4 MG disintegrating tablet, Take 1 tablet by mouth every 8 hours as needed for Nausea or Vomiting  metoprolol tartrate (LOPRESSOR) 25 MG tablet, Take 0.5 tablets by mouth 2 times daily  allopurinol (ZYLOPRIM) 300 MG tablet, Take 1 tablet by mouth daily  levothyroxine (SYNTHROID) 100 MCG tablet, Take 1 tablet by mouth Daily  pantoprazole (PROTONIX) 40 MG tablet, Take 1 tablet by mouth every morning (before breakfast)  aspirin 81 MG EC tablet, Take 81 mg by mouth daily    Inpatient Medications:   sodium chloride flush  5-40 mL IntraVENous 2 times per day       IV drips:   sodium chloride      sodium chloride         PRN:  sodium chloride, sodium chloride flush    Physical Examination   Vitals:    02/14/22 0954   Temp: 97.6 °F (36.4 °C)     General appearance - AAOX3  Chest - clear to auscultation  Heart - Regular heart sounds  Neck - No JVD    Airway assessment - Mallampati class - 3*    ASA classification - 3*    Labs:  Recent Labs     02/14/22  0940      K 5.1   BUN 14   CREATININE <0.5*      CO2 23   GLUCOSE 148*   CALCIUM 9.7     No results for input(s): WBC, HGB, HCT, PLT, MCV in the last 72 hours. No results for input(s): CHOLTOT, TRIG, HDL, LDL in the last 72 hours. Invalid input(s): LIPIDCOMM, CHOLHDL, VLDCHOL  Recent Labs     02/14/22  0940   INR 1.17*     No results for input(s): CKTOTAL, CKMB, CKMBINDEX, TROPONINI in the last 72 hours. No results for input(s): BNP in the last 72 hours. No results for input(s): NTPROBNP in the last 72 hours. No results for input(s): TSH in the last 72 hours. Planned anesthetic agent - provided by anesthesia    Sedation plan, risks, benefits. Potential complications and any alternative options associated with procedure and possible use of blood discussed with patient. Informed consent obtained. Plan:  Proceed with procedure    Alli Carpenter MD, [facility]    2/14/2022  11:24 AM

## 2022-02-14 NOTE — ANESTHESIA PRE PROCEDURE
Department of Anesthesiology  Preprocedure Note       Name:  Quang Hampton   Age:  73 y.o.  :  1950                                          MRN:  [id number]         Date:  2022      Surgeon: * Surgery not found *    Procedure:     Medications prior to admission:   Prior to Admission medications    Medication Sig Start Date End Date Taking? Authorizing Provider   digoxin (LANOXIN) 250 MCG tablet Take 1 tablet by mouth daily 22   ЕЛЕНА Askew - CNP   apixaban (ELIQUIS) 5 MG TABS tablet Take 1 tablet by mouth 2 times daily 21   ЕЛЕНА Lawler - CNP   levETIRAcetam (KEPPRA) 1000 MG tablet Take 1 tablet by mouth 2 times daily 10/26/21   Gopal Williamson MD   ondansetron (ZOFRAN-ODT) 4 MG disintegrating tablet Take 1 tablet by mouth every 8 hours as needed for Nausea or Vomiting 10/26/21   Gopal Williamson MD   metoprolol tartrate (LOPRESSOR) 25 MG tablet Take 0.5 tablets by mouth 2 times daily 10/26/21   Gopal Williamson MD   allopurinol (ZYLOPRIM) 300 MG tablet Take 1 tablet by mouth daily 10/27/21   Gopal Williamson MD   levothyroxine (SYNTHROID) 100 MCG tablet Take 1 tablet by mouth Daily 10/27/21   Gopal Williamson MD   pantoprazole (PROTONIX) 40 MG tablet Take 1 tablet by mouth every morning (before breakfast) 10/27/21   Gopal Williamson MD   aspirin 81 MG EC tablet Take 81 mg by mouth daily    Historical Provider, MD       Current medications:    No current facility-administered medications for this visit. No current outpatient medications on file.      Facility-Administered Medications Ordered in Other Visits   Medication Dose Route Frequency Provider Last Rate Last Admin    0.9 % sodium chloride infusion   IntraVENous Continuous Gladys Bonilla MD        0.9 % sodium chloride infusion  25 mL IntraVENous PRN Gladys Bonilla MD        sodium chloride flush 0.9 % injection 5-40 mL  5-40 mL IntraVENous 2 times per day Gladys Bonilla MD        sodium chloride flush 0.9 % injection 5-40 mL  5-40 mL IntraVENous PRN Mercy Starks MD           Allergies: Allergies   Allergen Reactions    Cephalexin Rash       Problem List:    Patient Active Problem List   Diagnosis Code    Intracranial mass R90.0    Lesion of lung R91.1    First time seizure ([address] R56.9    Hypothyroid E03.9    Stage III pressure ulcer of sacral region [facility]) L89.153    Open wound of chest wall S21.109A    Squamous cell carcinoma of bronchus in left upper lobe (HCC) C34.12    Paroxysmal atrial fibrillation (HCC) I48.0    On continuous oral anticoagulation Z79.01       Past Medical History:        Diagnosis Date    Arthritis     left shoulder    Cancer ([address]     Diverticula of colon     Fx ankle     LEFT    Hypertension     Hypothyroid     Lung cancer metastatic to brain ([address]     Malignant neoplasm of bronchus of upper lobe ([address]     Pneumonia 2021    Prostate enlargement     BENIGN    Seizure ([address] 10/19/2021       Past Surgical History:        Procedure Laterality Date    BRONCHOSCOPY N/A 10/21/2021    BRONCHOSCOPY ENDOBRONCHIAL ULTRASOUND performed by Luis E Perez MD at [address] [place] [address]  10/21/2021    BRONCHOSCOPY BIOPSY BRONCHUS performed by Luis E Perez MD at [address] [place] [address]  10/21/2021    BRONCHOSCOPY BRUSHINGS performed by Luis E Perez MD at [address] [place], [place] [address]    PORT SURGERY N/A 10/23/2021    RIGHT SUBCLAVIAN PORT A CATH INSERTION WITH FLUOROSCOPY GUIDANCE performed by Frankey Presser, MD at [place] [address]  10/13/2021       Social History:    Social History     Tobacco Use    Smoking status: Former Smoker     Quit date: 10/21/2001     Years since quittin.3    Smokeless tobacco: Never Used   Substance Use Topics    Alcohol use: Not Currently                                Counseling given: Not Answered      Vital Signs (Current):    There were no vitals filed for this visit.                                           BP Readings from Last 3 Encounters:   01/31/22 82/60   01/31/22 82/60   01/27/22 98/60       NPO Status:                                                                                 BMI:   Wt Readings from Last 3 Encounters:   01/31/22 190 lb (86.2 kg)   01/31/22 190 lb (86.2 kg)   01/27/22 198 lb (89.8 kg)     There is no height or weight on file to calculate BMI.    CBC:   Lab Results   Component Value Date    WBC 8.5 10/26/2021    RBC 4.33 10/26/2021    HGB 12.3 10/26/2021    HCT 37.5 10/26/2021    MCV 86.6 10/26/2021    RDW 16.2 10/26/2021     10/26/2021       CMP:   Lab Results   Component Value Date     10/26/2021    K 4.2 10/26/2021     10/26/2021    CO2 22 10/26/2021    BUN 23 10/26/2021    CREATININE 0.9 01/13/2022    CREATININE 0.7 10/26/2021    GFRAA >60 01/13/2022    GFRAA >60 12/09/2010    AGRATIO 1.3 10/20/2021    LABGLOM >60 01/13/2022    GLUCOSE 142 10/26/2021    PROT 7.0 10/20/2021    CALCIUM 8.2 10/26/2021    BILITOT 0.3 10/20/2021    ALKPHOS 86 10/20/2021    AST 17 10/20/2021    ALT 20 10/20/2021       POC Tests: No results for input(s): POCGLU, POCNA, POCK, POCCL, POCBUN, POCHEMO, POCHCT in the last 72 hours.     Coags:   Lab Results   Component Value Date    PROTIME 10.8 10/23/2021    INR 0.96 10/23/2021       HCG (If Applicable): No results found for: PREGTESTUR, PREGSERUM, HCG, HCGQUANT     ABGs:   Lab Results   Component Value Date    PHART 7.300 10/20/2021    PO2ART 239.0 10/20/2021    DIQ4IWJ 51.0 10/20/2021    BDQ5ZNG 25.1 10/20/2021    BEART -2.1 10/20/2021    B5KJWZGR >100.0 10/20/2021        Type & Screen (If Applicable):  No results found for: LABABO, LABRH    Drug/Infectious Status (If Applicable):  No results found for: HIV, HEPCAB    COVID-19 Screening (If Applicable): No results found for: COVID19        Anesthesia Evaluation  Patient summary reviewed and Nursing notes reviewed no history of anesthetic complications:   Airway: Mallampati: II  TM distance: >3 FB   Neck ROM: full  Mouth opening: > = 3 FB Dental:          Pulmonary: breath sounds clear to auscultation                            ROS comment: Lung ca with brain mets   Cardiovascular:    (+) hypertension:, dysrhythmias (PAF): atrial fibrillation,         Rhythm: irregular  Rate: abnormal                    Neuro/Psych:   (+) seizures:,             GI/Hepatic/Renal: Neg GI/Hepatic/Renal ROS            Endo/Other:    (+) hypothyroidism: arthritis:., malignancy/cancer (lung ca). Abdominal:             Vascular: negative vascular ROS. Other Findings:             Anesthesia Plan      general     ASA 3     (  )  Induction: intravenous. Anesthetic plan and risks discussed with patient.         Attending anesthesiologist reviewed and agrees with Preprocedure content        Jhonny Ruiz MD   2/14/2022

## 2022-02-14 NOTE — PROCEDURES
JAYLYN CARDIOVERSION    PROCEDURE PERFORMED: JAYLYN guided external electrical cardioversion. : Dr. Fran Cortez:  EMILY    COMPLICATIONS:  none    ESTIMATED BLOOD LOSS:  none    ANESTHESIA:  Provided by anesthesia    OTHER MEDICATIONS:  None    HISTORY:      PROCEDURE IN DETAIL:  The patient was in stable condition. The patient was in a fasting, nonsedated state. He was hemodynamically stable. The risks, benefits and alternatives of both the transesophageal echocardiography and external electrical cardioversion were discussed with the patient in detail. The risks associated with the JAYLYN, including but not limited to, the risks of oropharyngeal and esophageal injury, in addition to allergic reactions to the topical and systemic anesthetics used were all discussed with the patient. In regards to the external electrical cardioversion, the risks of asystole, stroke and skin burns were discussed with the patient. After considering the risks and benefits of both procedures, the patient opted to proceed with the JAYLYN guided external electrical cardioversion. Written informed consent was obtained and placed on the chart. At this point, a timeout protocol was completed to identify the patient and the procedure being performed. The patient was attached to continuous electrocardiographic monitoring with noninvasive blood pressure monitoring as well. Anterior and posterior chest defibrillation pads were attached in the typical position. The patient underwent the JAYLYN portion of the procedure, which is reported separately. Briefly, the patient had the JAYLYN probe advanced into position without difficulty. He tolerated the procedure well. Multiplane imaging of the cardiac chambers were obtained and showed no evidence of intracardiac thrombus or any other contraindication to proceed with external electrical cardioversion.  The probe was removed without difficulty and we proceeded to prepare for the external electrical cardioversion. The JAYLYN portion was done under moderate sedation. For the external electrical cardioversion, the patient was attached to an external defibrillator which was set to the synchronized mode. Once deep sedation was assured, a 200 joule synchronized external biphasic shock was delivered to the patient, which successfully converted him from atrial fibrillation to normal sinus rhythm. The patient maintained adequate oxygenation throughout the procedure. He remained hemodynamically stable. Within a few minutes of the successful cardioversion, the patient started to wake up and had no evidence of any neurologic sequelae. By 10 minutes after cardioversion, the patient was mostly awake and had no complaints. He was responsive, but somnolent. He was taken to the recovery room until he is fully awake and back to his baseline mental status. SUMMARY:    1. Successful JAYLYN guided external electrical cardioversion. RECOMMENDATIONS:    1. Bed rest x2 hours. 2. Continue telemetry monitoring while in the hospital.  3. Follow up in the electrophysiology clinic in three to four weeks. Alli Thayer MD, [facility] [place], [facility]

## 2022-02-17 ENCOUNTER — TELEPHONE (OUTPATIENT)
Dept: CARDIOLOGY CLINIC | Age: 72
End: 2022-02-17

## 2022-02-17 NOTE — TELEPHONE ENCOUNTER
Spoke with patient's wife, reviewed results. Patient doing okay, having some fatigue. Follow up with Dr. Monserrat Hernandez on 3/10/22.

## 2022-02-28 ENCOUNTER — TELEPHONE (OUTPATIENT)
Dept: CARDIOLOGY CLINIC | Age: 72
End: 2022-02-28
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
## 2022-02-28 NOTE — TELEPHONE ENCOUNTER
Patient no energy , still dizzy, not the same  after having his heart shocked. Is this normal?   Patient is out of metoprolol 25 mg.   does he still need to take metoprolol.   Please call patients wife Natasha Persaud at [phone]   please call wife after 2pm.          Last appt  1-31-22  Next appt  3-10-22    metoprolol tartrate (LOPRESSOR) 25 MG tablet  Take 0.5 tablets by mouth 2 times daily, Disp-60 tablet, R-1  Normal Last Dose: Not Recorded     [facility]acy:[facility]

## 2022-02-28 NOTE — TELEPHONE ENCOUNTER
Spoke with pt's wife. Since the DCCV on 2/14, pt has not felt any better, in fact fatigue is worse. He continues to have no energy to the point that he doesn't want to do anything but lay down. When he stands up, he gets dizzy. He has no appetite at all, wt down to 160 lbs (had been in the 170s). Of note, pt has lung CA with mets to brain, per the wife. They have not checked BP/HR at home. She called back 10 min later after checking with these results:    /83, HR 73 (laying)  /149, HR 78 (standing)  /87, HR 81 (once pt sat back down)    Advised that he continue metoprolol 12.5 mg BID and moved up OV with UL from 3/10 to 3/3. Advised to continue to monitor BP/HR and bring log to OV. Pt's wife verbalized understanding and very appreciative.

## 2022-03-03 ENCOUNTER — OFFICE VISIT (OUTPATIENT)
Dept: CARDIOLOGY CLINIC | Age: 72
End: 2022-03-03
Payer: MEDICARE

## 2022-03-03 VITALS
WEIGHT: 167 LBS | HEIGHT: 70 IN | HEART RATE: 96 BPM | BODY MASS INDEX: 23.91 KG/M2 | SYSTOLIC BLOOD PRESSURE: 116 MMHG | DIASTOLIC BLOOD PRESSURE: 72 MMHG

## 2022-03-03 DIAGNOSIS — E03.9 HYPOTHYROIDISM, UNSPECIFIED TYPE: ICD-10-CM

## 2022-03-03 DIAGNOSIS — I48.19 PERSISTENT ATRIAL FIBRILLATION (HCC): Primary | ICD-10-CM

## 2022-03-03 DIAGNOSIS — I10 BENIGN ESSENTIAL HTN: ICD-10-CM

## 2022-03-03 PROCEDURE — 4040F PNEUMOC VAC/ADMIN/RCVD: CPT | Performed by: INTERNAL MEDICINE

## 2022-03-03 PROCEDURE — G8484 FLU IMMUNIZE NO ADMIN: HCPCS | Performed by: INTERNAL MEDICINE

## 2022-03-03 PROCEDURE — 3017F COLORECTAL CA SCREEN DOC REV: CPT | Performed by: INTERNAL MEDICINE

## 2022-03-03 PROCEDURE — 1036F TOBACCO NON-USER: CPT | Performed by: INTERNAL MEDICINE

## 2022-03-03 PROCEDURE — G8420 CALC BMI NORM PARAMETERS: HCPCS | Performed by: INTERNAL MEDICINE

## 2022-03-03 PROCEDURE — G8427 DOCREV CUR MEDS BY ELIG CLIN: HCPCS | Performed by: INTERNAL MEDICINE

## 2022-03-03 PROCEDURE — 93000 ELECTROCARDIOGRAM COMPLETE: CPT | Performed by: INTERNAL MEDICINE

## 2022-03-03 PROCEDURE — 99214 OFFICE O/P EST MOD 30 MIN: CPT | Performed by: INTERNAL MEDICINE

## 2022-03-03 PROCEDURE — 1123F ACP DISCUSS/DSCN MKR DOCD: CPT | Performed by: INTERNAL MEDICINE

## 2022-03-10 ENCOUNTER — HOSPITAL ENCOUNTER (OUTPATIENT)
Dept: MRI IMAGING | Age: 72
Discharge: HOME | End: 2022-03-10
Payer: MEDICARE

## 2022-03-10 DIAGNOSIS — C34.12 SQUAMOUS CELL CARCINOMA OF BRONCHUS IN LEFT UPPER LOBE (HCC): ICD-10-CM

## 2022-03-10 DIAGNOSIS — R90.0 INTRACRANIAL MASS: ICD-10-CM

## 2022-03-10 PROCEDURE — A9579 GAD-BASE MR CONTRAST NOS,1ML: HCPCS | Performed by: INTERNAL MEDICINE

## 2022-03-10 PROCEDURE — 6360000004 HC RX CONTRAST MEDICATION: Performed by: INTERNAL MEDICINE

## 2022-03-10 PROCEDURE — 70553 MRI BRAIN STEM W/O & W/DYE: CPT

## 2022-03-10 RX ADMIN — GADOTERIDOL 15 ML: 279.3 INJECTION, SOLUTION INTRAVENOUS at 14:40

## 2024-12-21 NOTE — PROGRESS NOTES
Patient's hemoglobin this AM: 7.3  Pt seen and assessed at [facility]. Seen at [address] [place] [address] today for PRBC transfusion per standing orders for above lab values. Blood products transfused per [facility] policy. Pt tolerated transfusion well and without incident. Pt verbalizes understanding of discharge instructions. Discharged via w/c to home with family.  Electronically signed by Zay Roa RN on 1/8/2022 at 9:07 AM 21-Dec-2024 22:52

## (undated) DEVICE — Z DISCONTINUED USE 2749457 TUBING SAMP AD W12.5XH8.4IN D9.1IN NSL ORAL SMRT CAPNOLINE

## (undated) DEVICE — SYRINGE MED 10ML SLIP TIP BLNT FILL AND LUERLOCK DISP

## (undated) DEVICE — BRUSH CYTO L90CM DIA1MM NONRIGID DISP FOR MICRO SPEC

## (undated) DEVICE — NEEDLE ASPIR 19GA HISTOLOGY FLX VIZISHOT 2

## (undated) DEVICE — PORT INSERTION: Brand: MEDLINE INDUSTRIES, INC.

## (undated) DEVICE — SUTURE BOOT: Brand: DEROYAL

## (undated) DEVICE — GLOVE SURG SZ 7 CRM LTX FREE POLYISOPRENE POLYMER BEAD ANTI

## (undated) DEVICE — SUTURE MCRYL SZ 4-0 L27IN ABSRB UD L19MM PS-2 1/2 CIR PRIM Y426H

## (undated) DEVICE — FORCEP BX 1.8 MMX100 CM NDL RADIAL JAW DISP

## (undated) DEVICE — TOWEL,STOP FLAG GOLD N-W: Brand: MEDLINE

## (undated) DEVICE — SUTURE VCRL SZ 3-0 L18IN ABSRB UD L26MM SH 1/2 CIR J864D

## (undated) DEVICE — ADHESIVE SKIN CLSR 0.7ML TOP DERMBND ADV

## (undated) DEVICE — 8 FR. PTFE PEEL-APART PERCUTANEOUS INTRODUCER KIT: Brand: PEEL-APART PERCUTANEOUS INTRODUCER KIT